# Patient Record
Sex: MALE | Race: WHITE | Employment: OTHER | URBAN - METROPOLITAN AREA
[De-identification: names, ages, dates, MRNs, and addresses within clinical notes are randomized per-mention and may not be internally consistent; named-entity substitution may affect disease eponyms.]

---

## 2017-06-28 ENCOUNTER — APPOINTMENT (OUTPATIENT)
Dept: GENERAL RADIOLOGY | Age: 62
DRG: 603 | End: 2017-06-28
Attending: INTERNAL MEDICINE
Payer: MEDICARE

## 2017-06-28 ENCOUNTER — APPOINTMENT (OUTPATIENT)
Dept: CT IMAGING | Age: 62
DRG: 603 | End: 2017-06-28
Attending: INTERNAL MEDICINE
Payer: MEDICARE

## 2017-06-28 ENCOUNTER — HOSPITAL ENCOUNTER (INPATIENT)
Age: 62
LOS: 5 days | Discharge: HOME | DRG: 603 | End: 2017-07-03
Attending: INTERNAL MEDICINE | Admitting: INTERNAL MEDICINE
Payer: MEDICARE

## 2017-06-28 DIAGNOSIS — L02.419 CELLULITIS AND ABSCESS OF LEG, EXCEPT FOOT: Primary | ICD-10-CM

## 2017-06-28 DIAGNOSIS — L03.119 CELLULITIS AND ABSCESS OF LEG, EXCEPT FOOT: Primary | ICD-10-CM

## 2017-06-28 DIAGNOSIS — R65.10 SIRS (SYSTEMIC INFLAMMATORY RESPONSE SYNDROME) (HCC): ICD-10-CM

## 2017-06-28 DIAGNOSIS — L03.317 CELLULITIS OF BUTTOCK: ICD-10-CM

## 2017-06-28 PROBLEM — F02.80 ALZHEIMER'S DEMENTIA (HCC): Status: ACTIVE | Noted: 2017-06-28

## 2017-06-28 PROBLEM — L03.116 LEFT LEG CELLULITIS: Status: ACTIVE | Noted: 2017-06-28

## 2017-06-28 PROBLEM — G30.9 ALZHEIMER'S DEMENTIA (HCC): Status: ACTIVE | Noted: 2017-06-28

## 2017-06-28 PROBLEM — E78.5 HLD (HYPERLIPIDEMIA): Status: ACTIVE | Noted: 2017-06-28

## 2017-06-28 PROBLEM — L03.90 CELLULITIS: Status: ACTIVE | Noted: 2017-06-28

## 2017-06-28 PROBLEM — E87.20 LACTIC ACID ACIDOSIS: Status: ACTIVE | Noted: 2017-06-28

## 2017-06-28 PROBLEM — I10 HTN (HYPERTENSION): Status: ACTIVE | Noted: 2017-06-28

## 2017-06-28 PROBLEM — K21.9 GERD (GASTROESOPHAGEAL REFLUX DISEASE): Status: ACTIVE | Noted: 2017-06-28

## 2017-06-28 LAB
ALBUMIN SERPL BCP-MCNC: 2.7 G/DL (ref 3.4–5)
ALBUMIN/GLOB SERPL: 0.6 {RATIO} (ref 0.8–1.7)
ALP SERPL-CCNC: 54 U/L (ref 45–117)
ALT SERPL-CCNC: 21 U/L (ref 16–61)
ANION GAP BLD CALC-SCNC: 12 MMOL/L (ref 3–18)
AST SERPL W P-5'-P-CCNC: 19 U/L (ref 15–37)
BASOPHILS # BLD AUTO: 0 K/UL (ref 0–0.06)
BASOPHILS # BLD: 0 % (ref 0–2)
BILIRUB SERPL-MCNC: 0.3 MG/DL (ref 0.2–1)
BUN SERPL-MCNC: 16 MG/DL (ref 7–18)
BUN/CREAT SERPL: 14 (ref 12–20)
CALCIUM SERPL-MCNC: 8.1 MG/DL (ref 8.5–10.1)
CHLORIDE SERPL-SCNC: 102 MMOL/L (ref 100–108)
CO2 SERPL-SCNC: 23 MMOL/L (ref 21–32)
CREAT SERPL-MCNC: 1.17 MG/DL (ref 0.6–1.3)
DIFFERENTIAL METHOD BLD: ABNORMAL
EOSINOPHIL # BLD: 0.1 K/UL (ref 0–0.4)
EOSINOPHIL NFR BLD: 1 % (ref 0–5)
ERYTHROCYTE [DISTWIDTH] IN BLOOD BY AUTOMATED COUNT: 13 % (ref 11.6–14.5)
GLOBULIN SER CALC-MCNC: 4.6 G/DL (ref 2–4)
GLUCOSE SERPL-MCNC: 167 MG/DL (ref 74–99)
HCT VFR BLD AUTO: 39.1 % (ref 36–48)
HGB BLD-MCNC: 13.5 G/DL (ref 13–16)
LACTATE SERPL-SCNC: 0.9 MMOL/L (ref 0.4–2)
LACTATE SERPL-SCNC: 3.1 MMOL/L (ref 0.4–2)
LIPASE SERPL-CCNC: 158 U/L (ref 73–393)
LYMPHOCYTES # BLD AUTO: 15 % (ref 21–52)
LYMPHOCYTES # BLD: 1.4 K/UL (ref 0.9–3.6)
MAGNESIUM SERPL-MCNC: 1.8 MG/DL (ref 1.6–2.6)
MCH RBC QN AUTO: 34.3 PG (ref 24–34)
MCHC RBC AUTO-ENTMCNC: 34.5 G/DL (ref 31–37)
MCV RBC AUTO: 99.2 FL (ref 74–97)
MONOCYTES # BLD: 0.9 K/UL (ref 0.05–1.2)
MONOCYTES NFR BLD AUTO: 10 % (ref 3–10)
NEUTS SEG # BLD: 6.6 K/UL (ref 1.8–8)
NEUTS SEG NFR BLD AUTO: 74 % (ref 40–73)
PLATELET # BLD AUTO: 210 K/UL (ref 135–420)
PMV BLD AUTO: 10.5 FL (ref 9.2–11.8)
POTASSIUM SERPL-SCNC: 3.9 MMOL/L (ref 3.5–5.5)
PROT SERPL-MCNC: 7.3 G/DL (ref 6.4–8.2)
RBC # BLD AUTO: 3.94 M/UL (ref 4.7–5.5)
SODIUM SERPL-SCNC: 137 MMOL/L (ref 136–145)
WBC # BLD AUTO: 9 K/UL (ref 4.6–13.2)

## 2017-06-28 PROCEDURE — 80053 COMPREHEN METABOLIC PANEL: CPT | Performed by: INTERNAL MEDICINE

## 2017-06-28 PROCEDURE — 96360 HYDRATION IV INFUSION INIT: CPT

## 2017-06-28 PROCEDURE — 99284 EMERGENCY DEPT VISIT MOD MDM: CPT

## 2017-06-28 PROCEDURE — 83605 ASSAY OF LACTIC ACID: CPT | Performed by: INTERNAL MEDICINE

## 2017-06-28 PROCEDURE — 87040 BLOOD CULTURE FOR BACTERIA: CPT | Performed by: INTERNAL MEDICINE

## 2017-06-28 PROCEDURE — 65270000029 HC RM PRIVATE

## 2017-06-28 PROCEDURE — 74011000258 HC RX REV CODE- 258: Performed by: INTERNAL MEDICINE

## 2017-06-28 PROCEDURE — 83735 ASSAY OF MAGNESIUM: CPT | Performed by: INTERNAL MEDICINE

## 2017-06-28 PROCEDURE — 71010 XR CHEST PORT: CPT

## 2017-06-28 PROCEDURE — 81001 URINALYSIS AUTO W/SCOPE: CPT | Performed by: INTERNAL MEDICINE

## 2017-06-28 PROCEDURE — 74011636320 HC RX REV CODE- 636/320: Performed by: INTERNAL MEDICINE

## 2017-06-28 PROCEDURE — 83690 ASSAY OF LIPASE: CPT | Performed by: INTERNAL MEDICINE

## 2017-06-28 PROCEDURE — 74177 CT ABD & PELVIS W/CONTRAST: CPT

## 2017-06-28 PROCEDURE — 85025 COMPLETE CBC W/AUTO DIFF WBC: CPT | Performed by: INTERNAL MEDICINE

## 2017-06-28 PROCEDURE — 74011250636 HC RX REV CODE- 250/636: Performed by: INTERNAL MEDICINE

## 2017-06-28 RX ORDER — PANTOPRAZOLE SODIUM 40 MG/1
40 TABLET, DELAYED RELEASE ORAL 2 TIMES DAILY
Status: DISCONTINUED | OUTPATIENT
Start: 2017-06-28 | End: 2017-07-01

## 2017-06-28 RX ORDER — CITALOPRAM 20 MG/1
20 TABLET, FILM COATED ORAL DAILY
Status: DISCONTINUED | OUTPATIENT
Start: 2017-06-29 | End: 2017-07-03 | Stop reason: HOSPADM

## 2017-06-28 RX ORDER — LORAZEPAM 1 MG/1
1 TABLET ORAL
Status: DISCONTINUED | OUTPATIENT
Start: 2017-06-28 | End: 2017-07-03 | Stop reason: HOSPADM

## 2017-06-28 RX ORDER — RISPERIDONE 0.5 MG/1
1 TABLET, FILM COATED ORAL DAILY
Status: DISCONTINUED | OUTPATIENT
Start: 2017-06-29 | End: 2017-07-03 | Stop reason: HOSPADM

## 2017-06-28 RX ORDER — ACETAMINOPHEN 325 MG/1
650 TABLET ORAL
Status: DISCONTINUED | OUTPATIENT
Start: 2017-06-29 | End: 2017-07-03 | Stop reason: HOSPADM

## 2017-06-28 RX ORDER — DIVALPROEX SODIUM 125 MG/1
250 CAPSULE, COATED PELLETS ORAL 2 TIMES DAILY
Status: DISCONTINUED | OUTPATIENT
Start: 2017-06-28 | End: 2017-07-03 | Stop reason: HOSPADM

## 2017-06-28 RX ORDER — GUAIFENESIN/DEXTROMETHORPHAN 100-10MG/5
5 SYRUP ORAL
Status: DISCONTINUED | OUTPATIENT
Start: 2017-06-29 | End: 2017-07-03 | Stop reason: HOSPADM

## 2017-06-28 RX ORDER — METHOCARBAMOL 500 MG/1
500 TABLET, FILM COATED ORAL 4 TIMES DAILY
Status: DISCONTINUED | OUTPATIENT
Start: 2017-06-29 | End: 2017-07-03 | Stop reason: HOSPADM

## 2017-06-28 RX ORDER — FLUTICASONE PROPIONATE 50 MCG
2 SPRAY, SUSPENSION (ML) NASAL DAILY
Status: DISCONTINUED | OUTPATIENT
Start: 2017-06-29 | End: 2017-07-03 | Stop reason: HOSPADM

## 2017-06-28 RX ORDER — DEXTROSE MONOHYDRATE AND SODIUM CHLORIDE 5; .45 G/100ML; G/100ML
50 INJECTION, SOLUTION INTRAVENOUS CONTINUOUS
Status: DISCONTINUED | OUTPATIENT
Start: 2017-06-28 | End: 2017-07-03 | Stop reason: HOSPADM

## 2017-06-28 RX ORDER — FACIAL-BODY WIPES
10 EACH TOPICAL DAILY
Status: DISCONTINUED | OUTPATIENT
Start: 2017-06-29 | End: 2017-07-03 | Stop reason: HOSPADM

## 2017-06-28 RX ORDER — HEPARIN SODIUM 5000 [USP'U]/ML
5000 INJECTION, SOLUTION INTRAVENOUS; SUBCUTANEOUS EVERY 8 HOURS
Status: DISCONTINUED | OUTPATIENT
Start: 2017-06-28 | End: 2017-07-03 | Stop reason: HOSPADM

## 2017-06-28 RX ORDER — SODIUM CHLORIDE 0.9 % (FLUSH) 0.9 %
5-10 SYRINGE (ML) INJECTION AS NEEDED
Status: DISCONTINUED | OUTPATIENT
Start: 2017-06-28 | End: 2017-07-03 | Stop reason: HOSPADM

## 2017-06-28 RX ORDER — NAPROXEN 250 MG/1
500 TABLET ORAL 2 TIMES DAILY WITH MEALS
Status: DISCONTINUED | OUTPATIENT
Start: 2017-06-29 | End: 2017-07-03 | Stop reason: HOSPADM

## 2017-06-28 RX ORDER — LOPERAMIDE HYDROCHLORIDE 2 MG/1
2 CAPSULE ORAL
Status: DISCONTINUED | OUTPATIENT
Start: 2017-06-29 | End: 2017-07-03 | Stop reason: HOSPADM

## 2017-06-28 RX ADMIN — VANCOMYCIN HYDROCHLORIDE 1250 MG: 10 INJECTION, POWDER, LYOPHILIZED, FOR SOLUTION INTRAVENOUS at 23:44

## 2017-06-28 RX ADMIN — CEFTRIAXONE 1 G: 1 INJECTION, POWDER, FOR SOLUTION INTRAMUSCULAR; INTRAVENOUS at 22:33

## 2017-06-28 RX ADMIN — SODIUM CHLORIDE 1000 ML: 900 INJECTION, SOLUTION INTRAVENOUS at 18:31

## 2017-06-28 RX ADMIN — IOPAMIDOL 100 ML: 612 INJECTION, SOLUTION INTRAVENOUS at 20:10

## 2017-06-28 RX ADMIN — SODIUM CHLORIDE 1436 ML: 900 INJECTION, SOLUTION INTRAVENOUS at 22:31

## 2017-06-28 NOTE — ED NOTES
Bedside and Verbal shift change report given to Chantale Villasenor RN (oncoming nurse) by Lawson Sheikh RN   (offgoing nurse). Report included the following information SBAR, ED Summary, MAR and Recent Results.

## 2017-06-28 NOTE — IP AVS SNAPSHOT
Chen Rodriguez 
 
 
 [address] [facility] [address] 
[phone] Patient: Jojo Joshua MRN: [id number] NOU:[id number] You are allergic to the following No active allergies Recent Documentation Height Weight BMI Smoking Status 1.575 m 83.6 kg 33.71 kg/m2 Never Smoker Unresulted Labs Order Current Status CULTURE, BLOOD Preliminary result Emergency Contacts Name Discharge Info Relation Home Work Mobile Arelyg[employer]elaJewels DISCHARGE CAREGIVER [3] Guardian [19] [phone] About your hospitalization You were admitted on:  June 28, 2017 You last received care in the:  [address] [place] [address] You were discharged on:  July 3, 2017 Unit phone number:  [phone] Why you were hospitalized Your primary diagnosis was:  Cellulitis Your diagnoses also included:  Left Leg Cellulitis, Uti (Urinary Tract Infection), Down's Syndrome, Alzheimer's Dementia, Htn (Hypertension), Hld (Hyperlipidemia), Gerd (Gastroesophageal Reflux Disease), Lactic Acid Acidosis Providers Seen During Your Hospitalizations Provider Role Specialty Primary office phone Latasha Alarcon MD Attending Provider Emergency Medicine [phone] Eboni Beltre MD Attending Provider Internal Medicine [phone] Skip Osorio MD Attending Provider Internal Medicine [phone] Your Primary Care Physician (PCP) Primary Care Physician Office Phone Office Fax Ira Hdez [phone] Follow-up Information Follow up With Details Comments Contact Info Stefan Ford MD   [address] [facility] [address] [address] 
[phone] [address] [place] [address] is responsible for making arrangements for the PCP visit while in-house. Chosen to continue managing your healthcare needs [address] Laura Rolon, [address] [facility] [address] [phone] Brittany Knutson MD In 1 week  [address] [address] 
[phone] Current Discharge Medication List  
  
START taking these medications Dose & Instructions Dispensing Information Comments Morning Noon Evening Bedtime  
 trimethoprim-sulfamethoxazole  mg per tablet Commonly known as:  BACTRIM Your last dose was: Your next dose is:    
   
   
 Dose:  1 Tab Take 1 Tab by mouth two (2) times a day for 3 days. Quantity:  6 Tab Refills:  0 CONTINUE these medications which have NOT CHANGED Dose & Instructions Dispensing Information Comments Morning Noon Evening Bedtime  
 acetaminophen 650 mg CR tablet Commonly known as:  TYLENOL Your last dose was: Your next dose is:    
   
   
 Dose:  650 mg Take 650 mg by mouth every six (6) hours as needed for Pain. Refills:  0 BISCOLAX 10 mg suppository Generic drug:  bisacodyl Your last dose was: Your next dose is:    
   
   
 Dose:  10 mg Insert 10 mg into rectum daily. Refills:  0  
     
   
   
   
  
 citalopram 20 mg tablet Commonly known as:  Tanisha Anderson Your last dose was: Your next dose is: Take  by mouth daily. Refills:  0  
     
   
   
   
  
 divalproex 125 mg capsule Commonly known as:  DEPAKOTE SPRINKLE Your last dose was: Your next dose is:    
   
   
 Dose:  250 mg Take 2 Caps by mouth two (2) times a day. Quantity:  60 Cap Refills:  0  
     
   
   
   
  
 FISH OIL 1,000 mg Cap Generic drug:  omega-3 fatty acids-vitamin e Your last dose was: Your next dose is:    
   
   
 Dose:  1 Cap Take 1 Cap by mouth. Refills:  0  
     
   
   
   
  
 FLONASE 50 mcg/actuation nasal spray Generic drug:  fluticasone Your last dose was: Your next dose is:    
   
   
 Dose:  2 Spray 2 Sprays by Both Nostrils route daily. Refills:  0 Ralph Jackson Generic drug:  nutritional supplements Your last dose was: Your next dose is: Take  by mouth. Refills:  0  
     
   
   
   
  
 loperamide 2 mg tablet Commonly known as:  IMMODIUM Your last dose was: Your next dose is:    
   
   
 Dose:  2 mg Take 2 mg by mouth four (4) times daily as needed for Diarrhea. Refills:  0 LORazepam 1 mg tablet Commonly known as:  ATIVAN Your last dose was: Your next dose is:    
   
   
 Dose:  1 mg Take 1 Tab by mouth every four (4) hours as needed for Anxiety. Max Daily Amount: 6 mg. Quantity:  2 Tab Refills:  0  
     
   
   
   
  
 methocarbamol 500 mg tablet Commonly known as:  ROBAXIN Your last dose was: Your next dose is:    
   
   
 Dose:  500 mg Take 500 mg by mouth four (4) times daily. Refills:  0 MINTOX PO Your last dose was: Your next dose is:    
   
   
 Dose:  15 mL Take 15 mL by mouth daily as needed. Refills:  0  
     
   
   
   
  
 naproxen 500 mg tablet Commonly known as:  NAPROSYN Your last dose was: Your next dose is:    
   
   
 Dose:  500 mg Take 500 mg by mouth two (2) times daily (with meals). Refills:  0  
     
   
   
   
  
 pantoprazole 40 mg tablet Commonly known as:  PROTONIX Your last dose was: Your next dose is:    
   
   
 Dose:  40 mg Take 1 Tab by mouth two (2) times a day. Quantity:  30 Tab Refills:  0  
     
   
   
   
  
 * risperiDONE 1 mg tablet Commonly known as:  RisperDAL Your last dose was: Your next dose is: Take  by mouth daily. Refills:  0  
     
   
   
   
  
 * RisperDAL 0.5 mg tablet Generic drug:  risperiDONE Your last dose was:     
   
Your next dose is:    
   
   
 Dose:  0.5 mg  
 Take 0.5 mg by mouth as needed. Refills:  0  
     
   
   
   
  
 ROBAFEN CF 30- mg/5 mL solution Generic drug:  PSEUDOEPHEDRINE-dextromethorphan-guaiFENesin Your last dose was: Your next dose is:    
   
   
 Dose:  5 mL Take 5 mL by mouth every four (4) hours as needed for Cough. Refills:  0  
     
   
   
   
  
 * Notice: This list has 2 medication(s) that are the same as other medications prescribed for you. Read the directions carefully, and ask your doctor or other care provider to review them with you. Where to Get Your Medications Information on where to get these meds will be given to you by the nurse or doctor. ! Ask your nurse or doctor about these medications LORazepam 1 mg tablet  
 trimethoprim-sulfamethoxazole  mg per tablet Discharge Instructions Cellulitis: Care Instructions Your Care Instructions Cellulitis is a skin infection. It often occurs after a break in the skin from a scrape, cut, bite, or puncture, or after a rash. The doctor has checked you carefully, but problems can develop later. If you notice any problems or new symptoms, get medical treatment right away. Follow-up care is a key part of your treatment and safety. Be sure to make and go to all appointments, and call your doctor if you are having problems. It's also a good idea to know your test results and keep a list of the medicines you take. How can you care for yourself at home? · Take your antibiotics as directed. Do not stop taking them just because you feel better. You need to take the full course of antibiotics. · Prop up the infected area on pillows to reduce pain and swelling. Try to keep the area above the level of your heart as often as you can. · If your doctor told you how to care for your wound, follow your doctor's instructions. If you did not get instructions, follow this general advice: ¨ Wash the wound with clean water 2 times a day. Don't use hydrogen peroxide or alcohol, which can slow healing. ¨ You may cover the wound with a thin layer of petroleum jelly, such as Vaseline, and a nonstick bandage. ¨ Apply more petroleum jelly and replace the bandage as needed. · Be safe with medicines. Take pain medicines exactly as directed. ¨ If the doctor gave you a prescription medicine for pain, take it as prescribed. ¨ If you are not taking a prescription pain medicine, ask your doctor if you can take an over-the-counter medicine. To prevent cellulitis in the future · Try to prevent cuts, scrapes, or other injuries to your skin. Cellulitis most often occurs where there is a break in the skin. · If you get a scrape, cut, mild burn, or bite, wash the wound with clean water as soon as you can to help avoid infection. Don't use hydrogen peroxide or alcohol, which can slow healing. · If you have swelling in your legs (edema), support stockings and good skin care may help prevent leg sores and cellulitis. · Take care of your feet, especially if you have diabetes or other conditions that increase the risk of infection. Wear shoes and socks. Do not go barefoot. If you have athlete's foot or other skin problems on your feet, talk to your doctor about how to treat them. When should you call for help? Call your doctor now or seek immediate medical care if: 
· You have signs that your infection is getting worse, such as: 
¨ Increased pain, swelling, warmth, or redness. ¨ Red streaks leading from the area. ¨ Pus draining from the area. ¨ A fever. · You get a rash. Watch closely for changes in your health, and be sure to contact your doctor if: 
· You are not getting better after 1 day (24 hours). · You do not get better as expected. Where can you learn more? Urinary Tract Infections in Men: Care Instructions Your Care Instructions A urinary tract infection, or UTI, is a general term for an infection anywhere between the kidneys and the tip of the penis. UTIs can also be a result of a prostate problem. Most cause pain or burning when you urinate. Most UTIs are caused by bacteria and can be cured with antibiotics. It is important to complete your treatment so that the infection does not get worse. Follow-up care is a key part of your treatment and safety. Be sure to make and go to all appointments, and call your doctor if you are having problems. It's also a good idea to know your test results and keep a list of the medicines you take. How can you care for yourself at home? · Take your antibiotics as prescribed. Do not stop taking them just because you feel better. You need to take the full course of antibiotics. · Take your medicines exactly as prescribed. Your doctor may have prescribed a medicine, such as phenazopyridine (Pyridium), to help relieve pain when you urinate. This turns your urine orange. You may stop taking it when your symptoms get better. But be sure to take all of your antibiotics, which treat the infection. · Drink extra water for the next day or two. This will help make the urine less concentrated and help wash out the bacteria causing the infection. (If you have kidney, heart, or liver disease and have to limit your fluids, talk with your doctor before you increase your fluid intake.) · Avoid drinks that are carbonated or have caffeine. They can irritate the bladder. · Urinate often. Try to empty your bladder each time. · To relieve pain, take a hot bath or lay a heating pad (set on low) over your lower belly or genital area. Never go to sleep with a heating pad in place. To help prevent UTIs · Drink plenty of fluids, enough so that your urine is light yellow or clear like water.  If you have kidney, heart, or liver disease and have to limit fluids, talk with your doctor before you increase the amount of fluids you drink. · Urinate when you have the urge. Do not hold your urine for a long time. Urinate before you go to sleep. · Keep your penis clean. Catheter care If you have a drainage tube (catheter) in place, the following steps will help you care for it. · Always wash your hands before and after touching your catheter. · Check the area around the urethra for inflammation or signs of infection. Signs of infection include irritated, swollen, red, or tender skin, or pus around the catheter. · Clean the area around the catheter with soap and water two times a day. Dry with a clean towel afterward. · Do not apply powder or lotion to the skin around the catheter. To empty the urine collection bag · Wash your hands with soap and water. · Without touching the drain spout, remove the spout from its sleeve at the bottom of the collection bag. Open the valve on the spout. · Let the urine flow out of the bag and into the toilet or a container. Do not let the tubing or drain spout touch anything. · After you empty the bag, clean the end of the drain spout with tissue and water. Close the valve and put the drain spout back into its sleeve at the bottom of the collection bag. · Wash your hands with soap and water. When should you call for help? Call your doctor now or seek immediate medical care if: · Symptoms such as a fever, chills, nausea, or vomiting get worse or happen for the first time. · You have new pain in your back just below your rib cage. This is called flank pain. · There is new blood or pus in your urine. · You are not able to take or keep down your antibiotics. Watch closely for changes in your health, and be sure to contact your doctor if: 
· You are not getting better after taking an antibiotic for 2 days. · Your symptoms go away but then come back. Where can you learn more? Go to http://lavern-argenis.info/. Enter P100 in the search box to learn more about \"Urinary Tract Infections in Men: Care Instructions. \" Current as of: November 28, 2016 Content Version: 11.3 © 9035-6557 [employer]. Care instructions adapted under license by [employer] (which disclaims liability or warranty for this information). If you have questions about a medical condition or this instruction, always ask your healthcare professional. [address] any warranty or liability for your use of this information. Go to http://lavern-argenis.info/. Kartik Pardo in the search box to learn more about \"Cellulitis: Care Instructions. \" Current as of: October 13, 2016 Content Version: 11.3 © 9226-2679 [employer]. Care instructions adapted under license by [employer] (which disclaims liability or warranty for this information). If you have questions about a medical condition or this instruction, always ask your healthcare professional. [address] any warranty or liability for your use of this information. Patient armband removed and shredded Discharge Orders None Modern [employer] Announcement We are excited to announce that we are making your provider's discharge notes available to you in Modern Mast. You will see these notes when they are completed and signed by the physician that discharged you from your recent hospital stay. If you have any questions or concerns about any information you see in Modern Mast, please call the Health Information Department where you were seen or reach out to your Primary Care Provider for more information about your plan of care. Introducing [facility]! [employer] introduces [employer] patient portal. Now you can access parts of your medical record, email your doctor's office, and request medication refills online. 1. In your internet browser, go to [email or web address] 2. Click on the First Time User? Click Here link in the Sign In box. You will see the New Member Sign Up page. 3. Enter your [employer] Access Code exactly as it appears below. You will not need to use this code after youve completed the sign-up process. If you do not sign up before the expiration date, you must request a new code. · [employer] Access Code: [email or web address] Expires: 9/26/2017  5:41 PM 
 
4. Enter the last four digits of your Social Security Number (xxxx) and Date of Birth (mm/dd/yyyy) as indicated and click Submit. You will be taken to the next sign-up page. 5. Create a [employer] ID. This will be your [employer] login ID and cannot be changed, so think of one that is secure and easy to remember. 6. Create a [employer] password. You can change your password at any time. 7. Enter your Password Reset Question and Answer. This can be used at a later time if you forget your password. 8. Enter your e-mail address. You will receive e-mail notification when new information is available in [address] 9. Click Sign Up. You can now view and download portions of your medical record. 10. Click the Download Summary menu link to download a portable copy of your medical information. If you have questions, please visit the Frequently Asked Questions section of the [employer] website. Remember, [employer] is NOT to be used for urgent needs. For medical emergencies, dial 911. Now available from your iPhone and Android! General Information Please provide this summary of care documentation to your next provider. Patient Signature:  ____________________________________________________________ Date:  ____________________________________________________________  
  
Connye Brod Provider Signature:  ____________________________________________________________ Date:  ____________________________________________________________

## 2017-06-28 NOTE — ED TRIAGE NOTES
Patient with rash that started on bilateral legs and fever today. Sepsis Screening completed    (  )Patient meets SIRS criteria. ( x )Patient does not meet SIRS criteria.       SIRS Criteria is achieved when two or more of the following are present   Temperature < 96.8°F (36°C) or > 100.9°F (38.3°C)   Heart Rate > 90 beats per minute   Respiratory Rate > 20 breaths per minute   WBC count > 12,000 or <4,000 or > 10% bands

## 2017-06-28 NOTE — Clinical Note
Patient Class[de-identified] Observation [317] Type of Bed: Medical [8] Reason for Observation: cellulitis buttocks, thigh right, 
 Admitting Diagnosis: Cellulitis of thigh [305379] Admitting Diagnosis: Cellulitis of buttock [[id number]] Admitting Physician: Gabriel Berkowitz [[id number]] Attending Physician: Gabriel Berkowitz [[id number]] Comments: elevated lactic, 3.1

## 2017-06-28 NOTE — IP AVS SNAPSHOT
Current Discharge Medication List  
  
START taking these medications Dose & Instructions Dispensing Information Comments Morning Noon Evening Bedtime  
 trimethoprim-sulfamethoxazole  mg per tablet Commonly known as:  BACTRIM Your last dose was: Your next dose is:    
   
   
 Dose:  1 Tab Take 1 Tab by mouth two (2) times a day for 3 days. Quantity:  6 Tab Refills:  0 CONTINUE these medications which have NOT CHANGED Dose & Instructions Dispensing Information Comments Morning Noon Evening Bedtime  
 acetaminophen 650 mg CR tablet Commonly known as:  TYLENOL Your last dose was: Your next dose is:    
   
   
 Dose:  650 mg Take 650 mg by mouth every six (6) hours as needed for Pain. Refills:  0 BISCOLAX 10 mg suppository Generic drug:  bisacodyl Your last dose was: Your next dose is:    
   
   
 Dose:  10 mg Insert 10 mg into rectum daily. Refills:  0  
     
   
   
   
  
 citalopram 20 mg tablet Commonly known as:  Vikash Covert Your last dose was: Your next dose is: Take  by mouth daily. Refills:  0  
     
   
   
   
  
 divalproex 125 mg capsule Commonly known as:  DEPAKOTE SPRINKLE Your last dose was: Your next dose is:    
   
   
 Dose:  250 mg Take 2 Caps by mouth two (2) times a day. Quantity:  60 Cap Refills:  0  
     
   
   
   
  
 FISH OIL 1,000 mg Cap Generic drug:  omega-3 fatty acids-vitamin e Your last dose was: Your next dose is:    
   
   
 Dose:  1 Cap Take 1 Cap by mouth. Refills:  0  
     
   
   
   
  
 FLONASE 50 mcg/actuation nasal spray Generic drug:  fluticasone Your last dose was: Your next dose is:    
   
   
 Dose:  2 Spray 2 Sprays by Both Nostrils route daily. Refills:  0 Yvette Odell Generic drug:  nutritional supplements Your last dose was: Your next dose is: Take  by mouth. Refills:  0  
     
   
   
   
  
 loperamide 2 mg tablet Commonly known as:  IMMODIUM Your last dose was: Your next dose is:    
   
   
 Dose:  2 mg Take 2 mg by mouth four (4) times daily as needed for Diarrhea. Refills:  0 LORazepam 1 mg tablet Commonly known as:  ATIVAN Your last dose was: Your next dose is:    
   
   
 Dose:  1 mg Take 1 Tab by mouth every four (4) hours as needed for Anxiety. Max Daily Amount: 6 mg. Quantity:  2 Tab Refills:  0  
     
   
   
   
  
 methocarbamol 500 mg tablet Commonly known as:  ROBAXIN Your last dose was: Your next dose is:    
   
   
 Dose:  500 mg Take 500 mg by mouth four (4) times daily. Refills:  0 MINTOX PO Your last dose was: Your next dose is:    
   
   
 Dose:  15 mL Take 15 mL by mouth daily as needed. Refills:  0  
     
   
   
   
  
 naproxen 500 mg tablet Commonly known as:  NAPROSYN Your last dose was: Your next dose is:    
   
   
 Dose:  500 mg Take 500 mg by mouth two (2) times daily (with meals). Refills:  0  
     
   
   
   
  
 pantoprazole 40 mg tablet Commonly known as:  PROTONIX Your last dose was: Your next dose is:    
   
   
 Dose:  40 mg Take 1 Tab by mouth two (2) times a day. Quantity:  30 Tab Refills:  0  
     
   
   
   
  
 * risperiDONE 1 mg tablet Commonly known as:  RisperDAL Your last dose was: Your next dose is: Take  by mouth daily. Refills:  0  
     
   
   
   
  
 * RisperDAL 0.5 mg tablet Generic drug:  risperiDONE Your last dose was: Your next dose is:    
   
   
 Dose:  0.5 mg Take 0.5 mg by mouth as needed. Refills:  0 ROBAFEN CF 30- mg/5 mL solution Generic drug:  PSEUDOEPHEDRINE-dextromethorphan-guaiFENesin Your last dose was: Your next dose is:    
   
   
 Dose:  5 mL Take 5 mL by mouth every four (4) hours as needed for Cough. Refills:  0  
     
   
   
   
  
 * Notice: This list has 2 medication(s) that are the same as other medications prescribed for you. Read the directions carefully, and ask your doctor or other care provider to review them with you. Where to Get Your Medications Information on where to get these meds will be given to you by the nurse or doctor. ! Ask your nurse or doctor about these medications LORazepam 1 mg tablet  
 trimethoprim-sulfamethoxazole  mg per tablet

## 2017-06-29 PROBLEM — L03.116 LEFT LEG CELLULITIS: Status: RESOLVED | Noted: 2017-06-28 | Resolved: 2017-06-29

## 2017-06-29 LAB
ALBUMIN SERPL BCP-MCNC: 2.2 G/DL (ref 3.4–5)
ALBUMIN/GLOB SERPL: 0.5 {RATIO} (ref 0.8–1.7)
ALP SERPL-CCNC: 46 U/L (ref 45–117)
ALT SERPL-CCNC: 19 U/L (ref 16–61)
ANION GAP BLD CALC-SCNC: 7 MMOL/L (ref 3–18)
APPEARANCE UR: ABNORMAL
APTT PPP: 33.3 SEC (ref 23–36.4)
AST SERPL W P-5'-P-CCNC: 19 U/L (ref 15–37)
BACTERIA URNS QL MICRO: ABNORMAL /HPF
BILIRUB SERPL-MCNC: 0.3 MG/DL (ref 0.2–1)
BILIRUB UR QL: NEGATIVE
BUN SERPL-MCNC: 13 MG/DL (ref 7–18)
BUN/CREAT SERPL: 13 (ref 12–20)
CALCIUM SERPL-MCNC: 7.7 MG/DL (ref 8.5–10.1)
CHLORIDE SERPL-SCNC: 108 MMOL/L (ref 100–108)
CO2 SERPL-SCNC: 26 MMOL/L (ref 21–32)
COLOR UR: YELLOW
CREAT SERPL-MCNC: 1.02 MG/DL (ref 0.6–1.3)
EPITH CASTS URNS QL MICRO: NEGATIVE /LPF (ref 0–5)
ERYTHROCYTE [DISTWIDTH] IN BLOOD BY AUTOMATED COUNT: 13.2 % (ref 11.6–14.5)
GLOBULIN SER CALC-MCNC: 4.1 G/DL (ref 2–4)
GLUCOSE BLD STRIP.AUTO-MCNC: 150 MG/DL (ref 70–110)
GLUCOSE BLD STRIP.AUTO-MCNC: 91 MG/DL (ref 70–110)
GLUCOSE SERPL-MCNC: 90 MG/DL (ref 74–99)
GLUCOSE UR STRIP.AUTO-MCNC: NEGATIVE MG/DL
HCT VFR BLD AUTO: 35.6 % (ref 36–48)
HGB BLD-MCNC: 11.9 G/DL (ref 13–16)
HGB UR QL STRIP: ABNORMAL
INR PPP: 1 (ref 0.8–1.2)
KETONES UR QL STRIP.AUTO: NEGATIVE MG/DL
LEUKOCYTE ESTERASE UR QL STRIP.AUTO: ABNORMAL
MAGNESIUM SERPL-MCNC: 1.9 MG/DL (ref 1.6–2.6)
MCH RBC QN AUTO: 33.6 PG (ref 24–34)
MCHC RBC AUTO-ENTMCNC: 33.4 G/DL (ref 31–37)
MCV RBC AUTO: 100.6 FL (ref 74–97)
MUCOUS THREADS URNS QL MICRO: NEGATIVE /LPF
NITRITE UR QL STRIP.AUTO: POSITIVE
PH UR STRIP: 6 [PH] (ref 5–8)
PLATELET # BLD AUTO: 180 K/UL (ref 135–420)
PMV BLD AUTO: 10.2 FL (ref 9.2–11.8)
POTASSIUM SERPL-SCNC: 4 MMOL/L (ref 3.5–5.5)
PROT SERPL-MCNC: 6.3 G/DL (ref 6.4–8.2)
PROT UR STRIP-MCNC: NEGATIVE MG/DL
PROTHROMBIN TIME: 13.2 SEC (ref 11.5–15.2)
RBC # BLD AUTO: 3.54 M/UL (ref 4.7–5.5)
RBC #/AREA URNS HPF: ABNORMAL /HPF (ref 0–5)
SODIUM SERPL-SCNC: 141 MMOL/L (ref 136–145)
SP GR UR REFRACTOMETRY: >1.03 (ref 1–1.03)
UROBILINOGEN UR QL STRIP.AUTO: 0.2 EU/DL (ref 0.2–1)
WBC # BLD AUTO: 6.7 K/UL (ref 4.6–13.2)
WBC URNS QL MICRO: ABNORMAL /HPF (ref 0–5)

## 2017-06-29 PROCEDURE — 93971 EXTREMITY STUDY: CPT

## 2017-06-29 PROCEDURE — 36415 COLL VENOUS BLD VENIPUNCTURE: CPT | Performed by: INTERNAL MEDICINE

## 2017-06-29 PROCEDURE — 74011000258 HC RX REV CODE- 258: Performed by: INTERNAL MEDICINE

## 2017-06-29 PROCEDURE — 80053 COMPREHEN METABOLIC PANEL: CPT | Performed by: INTERNAL MEDICINE

## 2017-06-29 PROCEDURE — 74011250636 HC RX REV CODE- 250/636: Performed by: INTERNAL MEDICINE

## 2017-06-29 PROCEDURE — 85027 COMPLETE CBC AUTOMATED: CPT | Performed by: INTERNAL MEDICINE

## 2017-06-29 PROCEDURE — 87086 URINE CULTURE/COLONY COUNT: CPT | Performed by: INTERNAL MEDICINE

## 2017-06-29 PROCEDURE — 82962 GLUCOSE BLOOD TEST: CPT

## 2017-06-29 PROCEDURE — 85610 PROTHROMBIN TIME: CPT | Performed by: INTERNAL MEDICINE

## 2017-06-29 PROCEDURE — 74011250637 HC RX REV CODE- 250/637: Performed by: INTERNAL MEDICINE

## 2017-06-29 PROCEDURE — 65270000029 HC RM PRIVATE

## 2017-06-29 PROCEDURE — 85730 THROMBOPLASTIN TIME PARTIAL: CPT | Performed by: INTERNAL MEDICINE

## 2017-06-29 PROCEDURE — 92610 EVALUATE SWALLOWING FUNCTION: CPT

## 2017-06-29 PROCEDURE — 83735 ASSAY OF MAGNESIUM: CPT | Performed by: HOSPITALIST

## 2017-06-29 RX ADMIN — BISACODYL 10 MG: 10 SUPPOSITORY RECTAL at 10:25

## 2017-06-29 RX ADMIN — DIVALPROEX SODIUM 250 MG: 125 CAPSULE, COATED PELLETS ORAL at 10:25

## 2017-06-29 RX ADMIN — Medication 10 ML: at 06:01

## 2017-06-29 RX ADMIN — FLUTICASONE PROPIONATE 2 SPRAY: 50 SPRAY, METERED NASAL at 10:25

## 2017-06-29 RX ADMIN — METHOCARBAMOL 500 MG: 500 TABLET ORAL at 17:03

## 2017-06-29 RX ADMIN — METHOCARBAMOL 500 MG: 500 TABLET ORAL at 22:41

## 2017-06-29 RX ADMIN — HEPARIN SODIUM 5000 UNITS: 5000 INJECTION, SOLUTION INTRAVENOUS; SUBCUTANEOUS at 17:03

## 2017-06-29 RX ADMIN — DIVALPROEX SODIUM 250 MG: 125 CAPSULE, COATED PELLETS ORAL at 22:40

## 2017-06-29 RX ADMIN — NAPROXEN 500 MG: 250 TABLET ORAL at 17:02

## 2017-06-29 RX ADMIN — GUAIFENESIN AND DEXTROMETHORPHAN HYDROBROMIDE 5 ML: 100; 10 LIQUID ORAL at 10:25

## 2017-06-29 RX ADMIN — HEPARIN SODIUM 5000 UNITS: 5000 INJECTION, SOLUTION INTRAVENOUS; SUBCUTANEOUS at 00:20

## 2017-06-29 RX ADMIN — DEXTROSE MONOHYDRATE AND SODIUM CHLORIDE 75 ML/HR: 5; .45 INJECTION, SOLUTION INTRAVENOUS at 02:24

## 2017-06-29 RX ADMIN — CEFTRIAXONE 1 G: 1 INJECTION, POWDER, FOR SOLUTION INTRAMUSCULAR; INTRAVENOUS at 22:41

## 2017-06-29 RX ADMIN — PANTOPRAZOLE SODIUM 40 MG: 40 TABLET, DELAYED RELEASE ORAL at 22:41

## 2017-06-29 RX ADMIN — HEPARIN SODIUM 5000 UNITS: 5000 INJECTION, SOLUTION INTRAVENOUS; SUBCUTANEOUS at 06:04

## 2017-06-29 RX ADMIN — CITALOPRAM HYDROBROMIDE 20 MG: 20 TABLET ORAL at 10:25

## 2017-06-29 RX ADMIN — PANTOPRAZOLE SODIUM 40 MG: 40 TABLET, DELAYED RELEASE ORAL at 10:25

## 2017-06-29 RX ADMIN — NAPROXEN 500 MG: 250 TABLET ORAL at 07:53

## 2017-06-29 RX ADMIN — METHOCARBAMOL 500 MG: 500 TABLET ORAL at 10:25

## 2017-06-29 RX ADMIN — RISPERIDONE 1 MG: 0.5 TABLET, FILM COATED ORAL at 10:25

## 2017-06-29 RX ADMIN — VANCOMYCIN HYDROCHLORIDE 1250 MG: 10 INJECTION, POWDER, LYOPHILIZED, FOR SOLUTION INTRAVENOUS at 19:06

## 2017-06-29 NOTE — ROUTINE PROCESS
19:45: Received bedside/verbal report from Khloe Cobb RN. Report included FARRAH JONES, MAR.     05:00: Pt has alternated between periods of restful sleep and wakefulness entire shift. IVF infusing without difficuty. Safety measures in place. Pt required complete bed change X2 due to incontinence.

## 2017-06-29 NOTE — H&P
Hospitalist Admission History and Physical    NAME:  Sherry Vazquez   :      MRN:   [id number]     PCP:  Gretel Calderon MD  Date/Time:  2017 10:39 PM  Subjective:   CHIEF COMPLAINT:  LLE cellulitis     HISTORY OF PRESENT ILLNESS:     Leslie Lr is a 62 y.o. With medical hx as listed comes to the hospital for evaluation of LLE redness. Hx per caregiver due to patients mental status. Hx is very limited as the substitute caregiver that's present at bedside received a signout from the off going caregiver around 6pm. ALso I tried to reach his brother, Jeet Ortiz, but he did not asnwer his phone therefore left voicemail. Caregiver noticed patient started developing left sided thigh redness about 2 days ago and it had progressively worsened spreading more proximally therefore brought him to the ED. He was also noted to have some chills at home but no other complaints. His appetite remains the same, he wears diapers. He remains bedbound at home. Take his meds as given and eats pureed diet. When I saw the patient he was asleep and did not wake up to verbal stimuli. Per caregive its usually difficult to wake him up once asleep. No other complaints. FULL CODE         Past Medical History:   Diagnosis Date    Alzheimer's dementia     Down's syndrome     Seizure ([facility] [address]         No past surgical history on file. Social History   Substance Use Topics    Smoking status: Never Smoker    Smokeless tobacco: Not on file    Alcohol use Not on file        No family history on file. No Known Allergies     Prior to Admission Medications   Prescriptions Last Dose Informant Patient Reported? Taking? LORazepam (ATIVAN) 1 mg tablet   Yes No   Sig: Take 1 mg by mouth every four (4) hours as needed for Anxiety. MAG HYDROX/AL HYDROX/SIMETH (MINTOX PO)   Yes No   Sig: Take 15 mL by mouth daily as needed.    PSEUDOEPHEDRINE-dextromethorphan-guaiFENesin (ROBAFEN CF) 30- mg/5 mL solution   Yes No Sig: Take 5 mL by mouth every four (4) hours as needed for Cough. acetaminophen (TYLENOL) 650 mg CR tablet   Yes No   Sig: Take 650 mg by mouth every six (6) hours as needed for Pain. bisacodyl (BISCOLAX) 10 mg suppository   Yes No   Sig: Insert 10 mg into rectum daily. citalopram (CELEXA) 20 mg tablet   Yes No   Sig: Take  by mouth daily. divalproex (DEPAKOTE SPRINKLE) 125 mg capsule   No No   Sig: Take 2 Caps by mouth two (2) times a day. fluticasone (FLONASE) 50 mcg/actuation nasal spray   Yes No   Si Sprays by Both Nostrils route daily. loperamide (IMMODIUM) 2 mg tablet   Yes No   Sig: Take 2 mg by mouth four (4) times daily as needed for Diarrhea. methocarbamol (ROBAXIN) 500 mg tablet   Yes No   Sig: Take 500 mg by mouth four (4) times daily. naproxen (NAPROSYN) 500 mg tablet   Yes No   Sig: Take 500 mg by mouth two (2) times daily (with meals). nutritional supplements (BROOKS) pack   Yes No   Sig: Take  by mouth. omega-3 fatty acids-vitamin e (FISH OIL) 1,000 mg cap   Yes No   Sig: Take 1 Cap by mouth.   pantoprazole (PROTONIX) 40 mg tablet   No No   Sig: Take 1 Tab by mouth two (2) times a day. risperiDONE (RISPERDAL) 0.5 mg tablet   Yes No   Sig: Take 0.5 mg by mouth as needed. risperiDONE (RISPERDAL) 1 mg tablet   Yes No   Sig: Take  by mouth daily.       Facility-Administered Medications: None       REVIEW OF SYSTEMS:     [x] Unable to obtain  ROS due to  [x]mental status change  []sedated   []intubated   []Total of 12 systems reviewed as follows:  Constitutional:  negative fever, negative chills, negative weight loss  Eyes:   negative visual changes  ENT:   negative sore throat, tongue or lip swelling  Respiratory:  negative cough, negative dyspnea  Cards:   negative for chest pain, palpitations, lower extremity edema  GI:   negative for nausea, vomiting, diarrhea, and abdominal pain  Genitourinary: negative for frequency, dysuria  Integument:  negative for rash and pruritus  Hematologic:  negative for easy bruising and gum/nose bleeding  Musculoskel: negative for myalgias,  back pain and muscle weakness  Neurological:  negative for headaches, dizziness, vertigo  Behavl/Psych:  negative for feelings of anxiety, depression     Pertinent Positives include :    Objective:   VITALS:    Visit Vitals    /73 (BP 1 Location: Left arm)    Pulse 91    Temp 100 °F (37.8 °C)    Resp 20    Ht 5' 2\" (1.575 m)    Wt 81.2 kg (179 lb)    SpO2 100%    BMI 32.74 kg/m2     Temp (24hrs), Av °F (37.8 °C), Min:100 °F (37.8 °C), Max:100 °F (37.8 °C)      PHYSICAL EXAM:   General:    Sleeping (per care giver, difficult around when asleep, vitals stable)     Head:   Normocephalic, without obvious abnormality, atraumatic. Eyes:   Conjunctivae clear, anicteric sclerae. Pupils are equal  Nose:  Nares normal. No drainage or sinus tenderness. Throat:    Lips, mucosa, and tongue normal.  No Thrush  Neck:  Supple, symmetrical,  no adenopathy, thyroid: non tender    no carotid bruit and no JVD. Back:    Symmetric,  No CVA tenderness. Lungs:   Clear to auscultation bilaterally. No Wheezing or Rhonchi. No rales. Chest wall:  No tenderness or deformity. No Accessory muscle use. Heart:   Regular rate and rhythm,  no murmur, rub or gallop. Abdomen:   Soft, non-tender. Not distended. Bowel sounds normal. No masses  Extremities: Left LE redness and warmth in thigh area, atraumatic, No cyanosis. No edema. No clubbing  Skin:     Texture, turgor normal. No rashes or lesions.   Not Jaundiced  Lymph nodes: Cervical, supraclavicular normal.  Psych:  Hx of downs syndrome and cognitive impairment   Neurologic: Unable to assess as she is sleeping        LAB DATA REVIEWED:    No results found for: [address]  [facility]  Recent Labs      17   1829   NA  137   K  3.9   CL  102   CO2  23   BUN  16   CREA  1.17   GLU  167*   CA  8.1*   ALB  2.7*   WBC  9.0   HGB  13.5   HCT  39.1   PLT  210         IMAGING RESULTS:   []  I have personally reviewed the actual   []CXR  []CT scan    CXR:  CT :    Assessment/Plan:      Principal Problem:    Left leg cellulitis (6/28/2017)    Active Problems:    UTI (urinary tract infection) (8/26/2016)      Down's syndrome (8/26/2016)      Alzheimer's dementia (6/28/2017)      HTN (hypertension) (6/28/2017)      HLD (hyperlipidemia) (6/28/2017)      GERD (gastroesophageal reflux disease) (6/28/2017)      Cellulitis (6/28/2017)      Lactic acid acidosis (6/28/2017)       ___________________________________________________  PLAN:    Risk of deterioration:  []Low    []Moderate  [x]High    1. Left LE Cellulitis   2. UTI  3. Lactic Acidosis; likely from infection   4. Downs Syndrome   5. Alzheimer's dementia   6. GERD  7. HLD  8. Seizures  FULL CODE     -admit for further care to medical bed   -pan culture, Vanc and rocephin started. WIll cont for now   -neurochecks, bed rest   -follow up urine cultures   -LE Dopplers to rule out DVT  -IVF, npo until more awake then pureed diet. Nutrition team consulted   -resume home po meds when more awake.   -supportive care     Called his POA/Legal Guardian, Gisel Randle (brother), but did not answer therefore left a voicemail.          Prophylaxis:  []Lovenox  []Coumadin  [x]Hep SQ  []SCDs  []H2B/PPI    Disposition:  [x]Home w/ Family   []HH PT,OT,RN   []SNF/LTC   []SAH/Rehab    Discussed Code Status:    [x]Full Code      []DNR     ___________________________________________________    Care Plan discussed with:    [x]Patient   []Family    []ED Care Manager  []ED Doc   []Specialist :    Total Time Coordinating Admission:   55   minutes    []Total Critical Care Time:     ___________________________________________________  Admitting Physician: Flo Epps MD

## 2017-06-29 NOTE — PROGRESS NOTES
Pt admitted due to LLE. Reviewed pt's history and noted pt with a history of Alzheimer's dementia, Downs syndrome and seizure disorder. Pt lives at [address] (Rustam paz, [address] [place]; [phone]; [phone])). Contacted this facility and they Monserrat Johnson has confirmed pt lives at this facility. Jaz Lobato is the DON of this facility and pt's guardian. CM to continue to follow and assist with transition back to this facility when medically stable. Readmission Risk Assessment:     Moderate Risk and [facility]P/[facility] patients    RRAT Score:  13-20    Initial Assessment:  Return to [facility]     Emergency Contact:   Samara Santana/guardian    Pertinent Medical Hx:     Alzheimer's dementia, Downs syndrome and seizure disorder. PCP/Specialists:  Jennifer Mcdaniels      Community Services:   Group Home    DME:          Moderate Risk Care Transition Plan:  1. Evaluate for [facility], SNF, acute rehab, community care coordination of resources. 2. Involve patient/caregiver in assessment, planning, education and implement of intervention. 3. CM daily patient care huddles/interdisciplinary rounds. 4. PCP/Specialist appointment within 5  7 days made prior to discharge. 5. Facilitate transportation and logistics for follow-up appointments. 6. Medication reconciliation [address]  7. Formal handoff between hospital provider and post-acute provider to transition patient  Handoff to [facility] Nurse Navigator or PCP practice. Care Management Interventions  PCP Verified by CM: Yes  Mode of Transport at Discharge: Other (see comment) (Group Home)  Transition of Care Consult (CM Consult): Group Home  Health Maintenance Reviewed: Yes  Current Support Network: Adult Group Home  Confirm Follow Up Transport: [address] discussed with Pt/Family/Caregiver:  Yes

## 2017-06-29 NOTE — INTERDISCIPLINARY ROUNDS
Interdisciplinary Round Note   Patient Information: Nikki Harrell                                      329/01 Reason for Admission: Cellulitis of thigh  Cellulitis of buttock  Cellulitis  UTI (urinary tract infection)  Quality Measure: Not applicable            Attending Provider:   Saravanan Balderas MD  Primary Care Physician:       Robbi Ruby MD       [phone]  Consulting:  IP CONSULT TO HOSPITALIST  IDR Rounding Physician:  Past Medical History:   Past Medical History:   Diagnosis Date    Alzheimer's dementia     Down's syndrome     Seizure [facility])         Hospital day: 1                          - - -  Estimated discharge date:   RRAT Score: Medium Risk            16       Total Score        4 More than 1 Admission in calendar year    9 Patient Insurance is Medicare, Medicaid or Self Pay    3 Charlson Comorbidity Score        Criteria that do not apply:    Relationship with PCP    Patient Living Status    Patient Length of Stay > 5         Primary Goals for Today: DVT process    Secondary Goals for Today: no falls    Overnight Events: none    Fish: no    Central Line: no    Isolation: HD - Risperdal       IV Antibiotics?  Vanc, rocephin       When started: 6/28  Current Medication List:          Current Facility-Administered Medications   Medication Dose Route Frequency    sodium chloride (NS) flush 5-10 mL  5-10 mL IntraVENous PRN    cefTRIAXone (ROCEPHIN) 1 g in 0.9% sodium chloride (MBP/ADV) 50 mL MBP  1 g IntraVENous Q24H    Vancomycin - Pharmacy to Dose  1 Each Other Rx Dosing/Monitoring    citalopram (CELEXA) tablet 20 mg  20 mg Oral DAILY    fluticasone (FLONASE) 50 mcg/actuation nasal spray 2 Spray  2 Spray Both Nostrils DAILY    Arginine-Glutamine-Calcium HMB 7-7-1.5 gram pwpk 1 Packet  1 Packet Oral Per Protocol    LORazepam (ATIVAN) tablet 1 mg  1 mg Oral Q4H PRN    risperiDONE (RisperDAL) tablet 1 mg  1 mg Oral DAILY    acetaminophen (TYLENOL) tablet 650 mg  650 mg Oral Q6H PRN    bisacodyl (DULCOLAX) suppository 10 mg  10 mg Rectal DAILY    loperamide (IMODIUM) capsule 2 mg  2 mg Oral QID PRN    methocarbamol (ROBAXIN) tablet 500 mg  500 mg Oral QID    aluminum-magnesium hydroxide (MAALOX) oral suspension 15 mL  15 mL Oral QID PRN    naproxen (NAPROSYN) tablet 500 mg  500 mg Oral BID WITH MEALS    guaiFENesin-dextromethorphan (ROBITUSSIN DM) 100-10 mg/5 mL syrup 5 mL  5 mL Oral QID PRN    divalproex (DEPAKOTE SPRINKLE) capsule 250 mg  250 mg Oral BID    pantoprazole (PROTONIX) tablet 40 mg  40 mg Oral BID    dextrose 5 % - 0.45% NaCl infusion  75 mL/hr IntraVENous CONTINUOUS    heparin (porcine) injection 5,000 Units  5,000 Units SubCUTAneous Q8H    vancomycin (VANCOCIN) 1,250 mg in 0.9% sodium chloride 250 mL IVPB  1,250 mg IntraVENous Q18H      VTE Prophylaxis                                 Lines, Drains, & Airways  Peripheral IV 06/28/17 Left Hand-Site Assessment: Clean, dry, & intact     Vital signs:  Visit Vitals    /63 (BP 1 Location: Left arm, BP Patient Position: At rest;Supine)    Pulse (!) 57    Temp 97.6 °F (36.4 °C)    Resp 18    Ht 5' 2\" (1.575 m)    Wt 81.2 kg (179 lb)    SpO2 100%    BMI 32.74 kg/m2        Intake and Output:      06/28 1901 - 06/29 0700  In: 1943 [I.V.:1943]  Out: 120 [Urine:120]  Last Bowel Movement Date: 06/29/17     Stool Appearance: Soft, Formed           Current Diet: DIET DYSPHAGIA PUREED (NDD1)       Abdominal   Last Bowel Movement Date: 06/29/17  Stool Appearance: Soft, Formed  Nutrition  Chewing/Swallowing Problems: Unknown  Difficulty with Secretions: Unknown  Speech Slurred/Thick/Garbled: Unknown    NGT: no    Feeding Tube: no   Recent Glucose Results:   Lab Results   Component Value Date/Time    GLU 90 06/29/2017 02:03 AM     (H) 06/28/2017 06:29 PM    GLUCPOC 91 06/29/2017 06:31 AM    GI Prophylaxis: no        Type: ***        Activity Level:   Activity Level: Bed Rest    Needs assistance with ADLs: yes  PT Consult Status: ***  Equipment: ***  Surgical/Ortho Notes: ***   Current Immunizations: There is no immunization history on file for this patient.   RRAT Score:     Readmit Risk Tool  Support Systems: Family member(s)   Recommendations:       Discharge Disposition: TBD, pending progress    Needs for Discharge: ***           Recommendations from IDR team: ***    Other Notes: ***

## 2017-06-29 NOTE — PROCEDURES
[facility]  *** FINAL REPORT ***    Name: Tom Fernandez  MRN: [id number]    Inpatient  : 30 Dec 1955  HIS Order #: [id number]  [id number] [facility] Visit #: [id number]  Date: 2017    TYPE OF TEST: Peripheral Venous Testing    REASON FOR TEST  Limb swelling    Right Leg:-  Deep venous thrombosis:           No  Superficial venous thrombosis:    No  Deep venous insufficiency:        Not examined  Superficial venous insufficiency: Not examined      INTERPRETATION/FINDINGS  Duplex images were obtained using 2-D gray scale, color flow, and  spectral Doppler analysis. Right leg :  1. Deep vein(s) visualized include the common femoral, deep femoral,  proximal femoral, mid femoral, distal femoral, popliteal(above knee),  popliteal(fossa), popliteal(below knee), posterior tibial and peroneal   veins. 2. No evidence of deep venous thrombosis detected in the veins  visualized. 3. No evidence of deep vein thrombosis in the contralateral common  femoral vein. 4. No evidence of superficial thrombosis detected. ADDITIONAL COMMENTS    I have personally reviewed the data relevant to the interpretation of  this  study. TECHNOLOGIST: Pravin Perez  Signed: 2017 12:21 PM    PHYSICIAN: Nathaniel Flowers.  Trevin Santillan MD  Signed: 2017 01:27 PM

## 2017-06-29 NOTE — PROGRESS NOTES
Problem: Dysphagia (Adult)  Goal: *Acute Goals and Plan of Care (Insert Text)  Recommendations:  Diet: Puree with Honey Thick Liquids. No straws. Meds: One at a time in puree. Aspiration Precautions  Oral Care TID  Other: small bites/sips; alternating bites/sips; slow rate of intake; feeder/supervision during meals    Goals: Patient will:  1. Tolerate PO trials with 0 s/s overt distress in 4/5 trials  2. Utilize compensatory swallow strategies/maneuvers (decrease bite/sip, size/rate, alt. liq/sol) with min cues in 4/5 trials  3. Perform oral-motor/laryngeal exercises to increase oropharyngeal swallow function with min cues  4. Complete an objective swallow study (i.e., MBSS) to assess swallow integrity, r/o aspiration, and determine of safest LRD, min A  SPEECH LANGUAGE PATHOLOGY BEDSIDE SWALLOW EVALUATION     Patient: Gaviota Brar (32 y.o. male)  Date: 6/29/2017  Primary Diagnosis: Cellulitis of thigh  Cellulitis of buttock  Cellulitis  UTI (urinary tract infection)        Precautions: Aspiration. ASSESSMENT :  Based on the objective data described below, the patient presents with moderate oropharyngeal dysphagia c/b delayed oral and pharyngeal swallow as well +overt s/sx of aspiration/penetration. Patient alert and ?O as patient refused communication with clinicians. Two food trays at bedside appeared to be untouched. 0 command following for oral motor examination. Noted R lingual deviation, fasciculation, tongue thrust at times, and poor dentition. Oral delay and residue noted with MS trials with delayed cough. Poor tolerated of thin and NTL +/- straws; resolved with honey thick via cup/sips only. Impulsive drinking behaviors, therefore the cup sips must be controlled. Patient at risk for aspiration as patient presents with: strong cough, facial redness, cognitive impairment to utilize strategies, and decreased oral motor strength and coordination.  Education re: dysphagia, diet textures/consistencies, safe swallow guidelines, positioning, and supervision/feeding needs. D/w RN Gonzalo Wilson and CNA. Patient will benefit from skilled intervention to address the above impairments. Patients rehabilitation potential is considered to be Fair  Factors which may influence rehabilitation potential include:   [X]            Mental ability/status  [X]            Medical condition       PLAN :  Recommendations and Planned Interventions:  Puree with Honey Thick Liquids. SLP to f/u for dysphagia management. Frequency/Duration: Patient will be followed by speech-language pathology 1-2 times per day for at least 3 days per week to address goals. Discharge Recommendations: Danny Herrera vs TBD       SUBJECTIVE:   Patient stated . Efren Newton . OBJECTIVE:       Past Medical History:   Diagnosis Date    Alzheimer's dementia      Down's syndrome      Seizure ([facility] [address]     No past surgical history on file. Prior Level of Function/Home Situation: Per pt/chart  Home Situation  Living Alone: No  Support Systems: Family member(s)  Diet prior to admission: unknown   Current Diet:  puree   Cognitive and Communication Status:  Neurologic State: Alert  Orientation Level: Unable to verbalize  Cognition: No command following  Perception: Tactile, Verbal, Visual  Perseveration: No perseveration noted  Safety/Judgement: Lack of insight into deficits  Oral Assessment:  Oral Assessment  Labial: Decreased seal  Dentition: Intact; Limited  Oral Hygiene: good  Lingual: Decreased strength;Decreased rate;Right deviation; Flaccid  Velum: Unable to visualize; Other (comment) (refused to follow commands)  Mandible: No impairment  P.O. Trials:  Patient Position: 61 HOB  Vocal quality prior to P.O.: No impairment  Consistency Presented: Thin liquid;Puree;Mechanical soft;Ground; Honey thick liquid; Nectar thick liquid  How Presented: Self-fed/presented;Cup/sip;Spoon;Straw;Successive swallows  How Much:  (Meal)  Bolus Acceptance: No impairment  Bolus Formation/Control: Impaired  Type of Impairment: Delayed;Mastication;Lip closure  Propulsion: Delayed (# of seconds)  Oral Residue: 10-50% of bolus; Lingual  Initiation of Swallow: Delayed (# of seconds)  Laryngeal Elevation: Functional  Aspiration Signs/Symptoms: Strong cough; Watery eyes; Facial redness;Delayed cough/throat clear  Pharyngeal Phase Characteristics: Multiple swallows; Poor endurance;Easily fatigued ; Suspected pharyngeal residue;Effortful swallow; Audible swallow  Effective Modifications: Cup/sip;Small sips and bites; Spoon  Cues for Modifications: Maximal  Comments: Supervision required     Oral Phase Severity: Moderate  Pharyngeal Phase Severity : Moderate     GCODESwallowing:  Swallow Current Status CK= 40-59%   Swallow Goal Status CK= 40-59%     The severity rating is based on the following outcomes:  YAMINI Noms Swallow Level 4              Clinical Judgement     PAIN:  Start of Eval: 0  End of Eval: 0      After treatment:   [ ]            Patient left in no apparent distress sitting up in chair  [X]            Patient left in no apparent distress in bed  [X]            Call bell left within reach  [X]            Nursing notified  [ ]            Family present  [ ]            Caregiver present  [ ]            Bed alarm activated      COMMUNICATION/EDUCATION:   [X]            Aspiration precautions; swallow safety; compensatory techniques. [X]            Patient/family have participated as able in goal setting and plan of care. [X]            Patient/family agree to work toward stated goals and plan of care. [ ]            Patient understands intent and goals of therapy; neutral about participation. [X]            Patient unable to participate in goal setting/plan of care; educ ongoing with interdisciplinary staff  [X]            Posted safety precautions in patient's room.      Thank you for this referral.  SAMAN Herrera  Time Calculation: 20 mins

## 2017-06-29 NOTE — PROGRESS NOTES
Hospitalist Progress Note-critical care note     Patient: Yolanda Mays MRN: [id number]  CSN: [id number]    YOB: 1955  Age: 62 y.o. Sex: male    DOA: 6/28/2017 LOS:  LOS: 1 day            Chief complaint: cellulitis , uti. Dementia ,     Assessment/Plan         Patient Active Problem List   Diagnosis Code    Pneumonia J18.9    UTI (urinary tract infection) N39.0    Hypoxia R09.02    Upper abdominal pain R10.10    Mental retardation F79    Down's syndrome Q90.9    Alzheimer's dementia G30.9    HTN (hypertension) I10    HLD (hyperlipidemia) E78.5    GERD (gastroesophageal reflux disease) K21.9    Cellulitis L03.90    Lactic acid acidosis E87.2       1. Cellulitis of rt LE   Will continue vanc and rocephin   Marking the lesion   2. UTI  On rocephin   Will f/u with urine cx   3. Lactic Acidosis; likely from infection   Resolved   4. Downs Syndrome   On resperdal  Fall precaution   5. Alzheimer's dementia   Will have speech evaluation   6. GERD  On ppi   7. HLD  8. Seizures  Continue depakote , seizure precation   FULL CODE     Geeta Knott [phone]     I was told the number is not in service. Not sure his baseline, he was woke up from sleep, but not answering questions,, unable to contact the  Guardian        35 total min's spent on patient care. Review of systems:      Unable to obtain, not talking     Vital signs/Intake and Output:  Visit Vitals    /66 (BP 1 Location: Left arm, BP Patient Position: At rest)    Pulse 60    Temp 98 °F (36.7 °C)    Resp 18    Ht 5' 2\" (1.575 m)    Wt 81.2 kg (179 lb)    SpO2 100%    BMI 32.74 kg/m2     Current Shift:  06/29 0701 - 06/29 1900  In: 120 [P.O.:120]  Out: -   Last three shifts:  06/27 1901 - 06/29 0700  In: 1943 [I.V.:1943]  Out: 120 [Urine:120]    Physical Exam:  General: Was sleeping.-wake him up not verbal , not follow the command . no acute distress    HEENT: NC, Atraumatic.   PERRLA, anicteric sclerae. Lungs: CTA Bilaterally. No Wheezing/Rhonchi/Rales. Heart:  Regular  rhythm,  No murmur, No Rubs, No Gallops  Abdomen: Soft, Non distended, Non tender.  +Bowel sounds,   Extremities: No c/c. Irregular erythema noted, warm   Psych:   Not anxious or agitated. Neurologic:  Unable to obtain           Labs: Results:       Chemistry Recent Labs      06/29/17 0203 06/28/17   1829   GLU  90  167*   NA  141  137   K  4.0  3.9   CL  108  102   CO2  26  23   BUN  13  16   CREA  1.02  1.17   CA  7.7*  8.1*   AGAP  7  12   BUCR  13  14   AP  46  54   TP  6.3*  7.3   ALB  2.2*  2.7*   GLOB  4.1*  4.6*   AGRAT  0.5*  0.6*      CBC w/Diff Recent Labs      06/29/17 0203 06/28/17 1829   WBC  6.7  9.0   RBC  3.54*  3.94*   HGB  11.9*  13.5   HCT  35.6*  39.1   PLT  180  210   GRANS   --   74*   LYMPH   --   15*   EOS   --   1      Cardiac Enzymes No results for input(s): CPK, CKND1, ALDAIR in the last 72 hours. No lab exists for component: CKRMB, TROIP   Coagulation Recent Labs      06/29/17 0203   PTP  13.2   INR  1.0   APTT  33.3       Lipid Panel Lab Results   Component Value Date/Time    Cholesterol, total 140 05/12/2016 09:59 AM    HDL Cholesterol 46 05/12/2016 09:59 AM    LDL, calculated 75.8 05/12/2016 09:59 AM    VLDL, calculated 18.2 05/12/2016 09:59 AM    Triglyceride 91 05/12/2016 09:59 AM    CHOL/HDL Ratio 3.0 05/12/2016 09:59 AM      BNP No results for input(s): BNPP in the last 72 hours.    Liver Enzymes Recent Labs      06/29/17 0203   TP  6.3*   ALB  2.2*   AP  46   SGOT  19      Thyroid Studies No results found for: T4, T3U, TSH, TSHEXT     Procedures/imaging: see electronic medical records for all procedures/Xrays and details which were not copied into this note but were reviewed prior to creation of Azeb Cruz MD

## 2017-06-29 NOTE — PROGRESS NOTES
INITIAL NUTRITION ASSESSMENT     RECOMMENDATIONS/PLAN:   ? Need for SLP eval to determine maximum tolerated safe diet consistency    Will order ensure enlive BID to aid in meeting estimated needs  Continue to encourage PO intakes >75% throughout the next 5 days  Monitor labs/lytes, fluid status, weight, bowel function, skin integrity  Will continue to follow and adjust reccs as needed    REASON FOR ASSESSMENT:   [x]  MD Consult:    [x] General Nutrition Management and Supplements   [] Management of Tube Feeding   [] Calorie Count    [] Diet Education     NUTRITION ASSESSMENT:   Client History: 62 yrs old Male admitted with LLE redness, hx of AMS.  Pt found to have cellulitis, UTI  PMHx: alzheimer's disease, down syndrome, seizure   Cultural/Rastafari Food Preferences: None Identified    FOOD/NUTRITION HISTORY  Diet History: unable to assess hx at this time   Food Allergies:  [x] NKFA       Pertinent PTA Medications: protonix, omega 3, biscolax, Eduardo, immodium    NUTRITION INTAKE   Diet Order:  pureed   Average PO Intake:       Patient Vitals for the past 100 hrs:   % Diet Eaten   06/29/17 0858 75 %      Pertinent Medications:  [x] Reviewed; dulcolax, protonix    Insulin:  [] SSI  [] Pre-meal   []  Basal   [] Drip  [x] None  Pt expected to meet estimated nutrient needs through next review:          [x]  Yes     PO intakes >75% meets estimated needs ANTHROPOMETRICS  Height: 5' 2\" (157.5 cm)       Weight: 81.2 kg (179 lb)    BMI: 32.7 kg/m^2  -  obese (30%-39.9% BMI)        Weight change: chart hx reviewed, no evidence of wt loss PTA                                  Comparison to Reference Standards:  IBW: 110lbs      %IBW: 163%      AdjBW: 57.8 kg    NUTRITION-FOCUSED PHYSICAL ASSESSMENT  Skin: no pressure area per documentation     GI: BM 6/29    BIOCHEMICAL DATA & MEDICAL TESTS  Pertinent Labs:  [x] Reviewed    NUTRITION PRESCRIPTION  Calories: 1949 kcal/day based on miffilin x 1.3  Protein: 75-86 g/day based on 1.3-1.5 g/kg ABW  CHO: 243 g/day based on 50% of total energy  Fluid: 1949 ml/day based on 1 kcal/ml      NUTRITION DIAGNOSES:   1. Increased estimated energy needs related to current infection as evidenced by pt with LLE cellulitis    NUTRITION INTERVENTIONS:   INTERVENTIONS:        GOALS:  1. Ensure BID 1. PO intakes >75% of meals and supplements throughout the next 5-7 days     LEARNING NEEDS (Diet, Supplementation, Food/Nutrient-Drug Interaction):   [x] None Identified  [] Inpatient education provided/documented    [] Identified and patient:  [] Declined     [x] Was not appropriate/indicated  NUTRITION MONITORING /EVALUATION:   Follow PO intake  Monitor wt  Monitor renal labs, electrolytes, fluid status  Monitor for additional supplement needs    [] Participated in Interdisciplinary Rounds  [x] [facility] Reviewed/Documented  DISCHARGE NUTRITION RECOMMENDATIONS ADDRESSED:           [x] To be determined closer to discharge    NUTRITION RISK:     [x]  At risk                     []  Not currently at risk     Will follow-up per policy.   Mark Ellis, [address] [place] [address]  PAGER:  [phone]

## 2017-06-29 NOTE — ROUTINE PROCESS
Bedside and Verbal shift change report given to RASTA Menard RN (oncoming nurse) by Judit Felix RN  (offgoing nurse). Report included the following information SBAR, Intake/Output and MAR.

## 2017-06-29 NOTE — ED NOTES
Bedside report completed with Sirisha Ramirez RN Medical unit; mews score 1    TRANSFER - OUT REPORT:    Verbal report given to Garland Good RN(name) on Arzella Merlin  being transferred to Medical(unit) for routine progression of care       Report consisted of patients Situation, Background, Assessment and   Recommendations(SBAR). Information from the following report(s) SBAR, Kardex, ED Summary, Intake/Output, MAR and Recent Results was reviewed with the receiving nurse. Lines:   Peripheral IV 06/28/17 Left Hand (Active)   Site Assessment Clean, dry, & intact 6/28/2017  6:27 PM   Phlebitis Assessment 0 6/28/2017  6:27 PM   Infiltration Assessment 0 6/28/2017  6:27 PM   Dressing Status Clean, dry, & intact 6/28/2017  6:27 PM   Dressing Type Transparent 6/28/2017  6:27 PM   Hub Color/Line Status Flushed;Patent 6/28/2017  6:27 PM        Opportunity for questions and clarification was provided.       Patient transported with:   [facility] [employer]

## 2017-06-29 NOTE — PROGRESS NOTES
conducted an initial consultation and Spiritual Assessment for Odalys Anthony, who is a 62 y.o.,male. Patients Primary Language is: [place]. According to the patients EMR Muslim Affiliation is: Unknown. The reason the Patient came to the hospital is:   Patient Active Problem List    Diagnosis Date Noted    Alzheimer's dementia 06/28/2017    HTN (hypertension) 06/28/2017    HLD (hyperlipidemia) 06/28/2017    GERD (gastroesophageal reflux disease) 06/28/2017    Cellulitis 06/28/2017    Lactic acid acidosis 06/28/2017    Pneumonia 08/26/2016    UTI (urinary tract infection) 08/26/2016    Hypoxia 08/26/2016    Upper abdominal pain 08/26/2016    Mental retardation 08/26/2016    Down's syndrome 08/26/2016      Assessment:  Patient does not have any Congregation/cultural needs that will affect patients preferences in health care. There are no further spiritual or Congregation issues which require intervention at this time. Plan:  Chaplains will continue to follow and will provide pastoral care on an as needed/requested basis.  recommends bedside caregivers page  on duty if patient shows signs of acute spiritual or emotional distress.       Sister Chelsie Castilol, [place], [address]  [phone]

## 2017-06-29 NOTE — PROGRESS NOTES

## 2017-06-29 NOTE — ED NOTES
Pt resting comfortably on stretcher, asleep, caregiver at bedside, states he is normally in bed to sleep by this time; pt has not provided urine via urinal at this time, it has been placed between legs by Seamus Butterfield RN previous shift and we have been checking on it;

## 2017-06-29 NOTE — PROGRESS NOTES
Pt Alley Severy, 63 yo male. Pt in bed, awake during bedside report. Pt has history to Down Syndrome, he is able to respond to simple questions with delayed response. He does not answer every question. Pt able to take pills with apple sauce, he coughs with thin liquids. 0900 Pt has difficulty with think liquids, he coughs after swallowing. He is able to tolerate pureed breakfast well. He closes his eyes, possibly falling asleep while eating. He occasionally holds food in his mouth. [address] for HD medications, not loaded in 3S Pyxis. 1040 Administered medication with apple sauce and thickened grape juice. Pt tolerated well, no coughing with 1 packet of nectar thickener. 1115 Pt transported to ultrasound for Doppler study. 1240 Pt returned to room. Sleeping. 1500 Pt in bed during bedside report. Pt about to have spa bath service.

## 2017-06-29 NOTE — PROGRESS NOTES
2240 Report received. Concern for lactic acid 3.1, and redrawn just prior to report. Recommended to wait for lactic acid result. NS bolus and rocephin infusing at this time. Appears comfortable. Visit Vitals    /48 (BP 1 Location: Left arm, BP Patient Position: At rest;Head of bed elevated (Comment degrees))    Pulse 65    Temp 97.8 °F (36.6 °C)    Resp 18    Ht 5' 2\" (1.575 m)    Wt 81.2 kg (179 lb)    SpO2 96%    BMI 32.74 kg/m2      2313 Pt's lactic acid is 0.9. Pt arrived to rm 329. Pt drowsy. Right thigh appears red and inflamed, and reaches to buttocks, perineal area, and left buttocks. Continued NS bolus. 2324 Vancomycin 1250 mg IV was not yet given, but order was discontinued. Informed pharmacy, who is to dose vancomycin. Per pharmacist Nghia Scanlon, give this dose now. Vanc infusion started. 0022 PO medications, depakote, protonix held due to NPO status until pt's neuro status improves (pt drowsy at this time). 0100 Bolus complete. Pt awake. Large BM with soft, brown, formed stool. Has not yet voided. [address] Brook Sylvester that pt is incontinent and will likely need straight cath for urine sample. Received order for one time prn straight cath to urine specimen and urine culture. Also informed Dr. Brook Sylvester that his note says Left leg cellulitis, but his right leg appears inflamed. 0860 Urine specimen obtained and sent to lab. SHIFT SUMMARY: Pt able to sleep for most of shift. Incontinent. Pt does not attempt to get up and has not triggered bed alarm. Sacrum skin intact. Placed on hazardous drug precaution for risperdal. Pt became more alert by morning, so diet was progressed to puree diet, as written per Dr. Brook Sylvester.

## 2017-06-29 NOTE — ROUTINE PROCESS
Bedside and Verbal shift change report given to JOHNNY Encinas (oncoming nurse) by Alyson Rodriguez RN (offgoing nurse). Report included the following information SBAR, Kardex, ED Summary, Procedure Summary, Intake/Output, MAR, Accordion, Recent Results and Med Rec Status.

## 2017-06-29 NOTE — PROGRESS NOTES
Bedside and Verbal shift change report given to JOHNNY Hernandez RN (oncoming nurse) by Kitty Claros RN (offgoing nurse). Report included the following information SBAR and Kardex.      Patient Vitals for the past 12 hrs:   Temp Pulse Resp BP SpO2   06/29/17 1928 98.2 °F (36.8 °C) (!) 48 16 95/54 -   06/29/17 1508 97.2 °F (36.2 °C) 68 18 94/51 98 %   06/29/17 0912 98 °F (36.7 °C) 60 18 115/66 100 %   06/29/17 0800 - - - - 100 %

## 2017-06-29 NOTE — PROGRESS NOTES
Pharmacy Dosing Services: Vancomycin    Consult for Vancomycin Dosing by Pharmacy by Dr. Kiet Sharma provided for this 62y.o. year old male , for indication of skin and soft tissue infection. Day of Therapy 1    Ht Readings from Last 1 Encounters:   06/28/17 157.5 cm (62\")        Wt Readings from Last 1 Encounters:   06/28/17 81.2 kg (179 lb)      Other Current Antibiotics Ceftriaxone 1 gram IV q24h   Significant Cultures pending   Serum Creatinine Lab Results   Component Value Date/Time    Creatinine 1.17 06/28/2017 06:29 PM      Creatinine Clearance Estimated Creatinine Clearance: 61.9 mL/min (based on Cr of 1.17). BUN Lab Results   Component Value Date/Time    BUN 16 06/28/2017 06:29 PM      WBC Lab Results   Component Value Date/Time    WBC 9.0 06/28/2017 06:29 PM      H/H Lab Results   Component Value Date/Time    HGB 13.5 06/28/2017 06:29 PM      Platelets Lab Results   Component Value Date/Time    PLATELET 780 79/78/0996 06:29 PM      Temp 100 °F (37.8 °C)     Start Vancomycin therapy, Vancomycin 1250 mg IV every 18 hours, starting on 6/28/17 at 22:00. Dose calculated to approximate a therapeutic trough of 10 - 15 mcg/mL. Pharmacy to follow daily and will make changes to dose and/or frequency based on clinical status.   Pharmacist Denilson Gibbons

## 2017-06-30 LAB
ANION GAP BLD CALC-SCNC: 10 MMOL/L (ref 3–18)
BACTERIA SPEC CULT: NORMAL
BUN SERPL-MCNC: 7 MG/DL (ref 7–18)
BUN/CREAT SERPL: 8 (ref 12–20)
CALCIUM SERPL-MCNC: 7.9 MG/DL (ref 8.5–10.1)
CHLORIDE SERPL-SCNC: 108 MMOL/L (ref 100–108)
CO2 SERPL-SCNC: 25 MMOL/L (ref 21–32)
CREAT SERPL-MCNC: 0.9 MG/DL (ref 0.6–1.3)
GLUCOSE BLD STRIP.AUTO-MCNC: 110 MG/DL (ref 70–110)
GLUCOSE BLD STRIP.AUTO-MCNC: 85 MG/DL (ref 70–110)
GLUCOSE BLD STRIP.AUTO-MCNC: 93 MG/DL (ref 70–110)
GLUCOSE SERPL-MCNC: 91 MG/DL (ref 74–99)
POTASSIUM SERPL-SCNC: 3.7 MMOL/L (ref 3.5–5.5)
SERVICE CMNT-IMP: NORMAL
SODIUM SERPL-SCNC: 143 MMOL/L (ref 136–145)

## 2017-06-30 PROCEDURE — 82962 GLUCOSE BLOOD TEST: CPT

## 2017-06-30 PROCEDURE — 74011000258 HC RX REV CODE- 258: Performed by: INTERNAL MEDICINE

## 2017-06-30 PROCEDURE — 80048 BASIC METABOLIC PNL TOTAL CA: CPT | Performed by: INTERNAL MEDICINE

## 2017-06-30 PROCEDURE — 74011250636 HC RX REV CODE- 250/636: Performed by: INTERNAL MEDICINE

## 2017-06-30 PROCEDURE — 36415 COLL VENOUS BLD VENIPUNCTURE: CPT | Performed by: INTERNAL MEDICINE

## 2017-06-30 PROCEDURE — 92526 ORAL FUNCTION THERAPY: CPT

## 2017-06-30 PROCEDURE — 74011250637 HC RX REV CODE- 250/637: Performed by: INTERNAL MEDICINE

## 2017-06-30 PROCEDURE — 65270000029 HC RM PRIVATE

## 2017-06-30 RX ADMIN — VANCOMYCIN HYDROCHLORIDE 1250 MG: 10 INJECTION, POWDER, LYOPHILIZED, FOR SOLUTION INTRAVENOUS at 15:41

## 2017-06-30 RX ADMIN — CITALOPRAM HYDROBROMIDE 20 MG: 20 TABLET ORAL at 08:45

## 2017-06-30 RX ADMIN — RISPERIDONE 1 MG: 0.5 TABLET, FILM COATED ORAL at 08:45

## 2017-06-30 RX ADMIN — PANTOPRAZOLE SODIUM 40 MG: 40 TABLET, DELAYED RELEASE ORAL at 23:13

## 2017-06-30 RX ADMIN — NAPROXEN 500 MG: 250 TABLET ORAL at 08:45

## 2017-06-30 RX ADMIN — BISACODYL 10 MG: 10 SUPPOSITORY RECTAL at 08:46

## 2017-06-30 RX ADMIN — HEPARIN SODIUM 5000 UNITS: 5000 INJECTION, SOLUTION INTRAVENOUS; SUBCUTANEOUS at 15:42

## 2017-06-30 RX ADMIN — HEPARIN SODIUM 5000 UNITS: 5000 INJECTION, SOLUTION INTRAVENOUS; SUBCUTANEOUS at 08:46

## 2017-06-30 RX ADMIN — FLUTICASONE PROPIONATE 2 SPRAY: 50 SPRAY, METERED NASAL at 15:44

## 2017-06-30 RX ADMIN — HEPARIN SODIUM 5000 UNITS: 5000 INJECTION, SOLUTION INTRAVENOUS; SUBCUTANEOUS at 00:00

## 2017-06-30 RX ADMIN — METHOCARBAMOL 500 MG: 500 TABLET ORAL at 18:32

## 2017-06-30 RX ADMIN — HEPARIN SODIUM 5000 UNITS: 5000 INJECTION, SOLUTION INTRAVENOUS; SUBCUTANEOUS at 23:16

## 2017-06-30 RX ADMIN — DIVALPROEX SODIUM 250 MG: 125 CAPSULE, COATED PELLETS ORAL at 23:13

## 2017-06-30 RX ADMIN — DIVALPROEX SODIUM 250 MG: 125 CAPSULE, COATED PELLETS ORAL at 08:45

## 2017-06-30 RX ADMIN — CEFTRIAXONE 1 G: 1 INJECTION, POWDER, FOR SOLUTION INTRAMUSCULAR; INTRAVENOUS at 23:14

## 2017-06-30 RX ADMIN — METHOCARBAMOL 500 MG: 500 TABLET ORAL at 08:46

## 2017-06-30 RX ADMIN — PANTOPRAZOLE SODIUM 40 MG: 40 TABLET, DELAYED RELEASE ORAL at 08:45

## 2017-06-30 RX ADMIN — METHOCARBAMOL 500 MG: 500 TABLET ORAL at 15:43

## 2017-06-30 RX ADMIN — NAPROXEN 500 MG: 250 TABLET ORAL at 18:32

## 2017-06-30 RX ADMIN — METHOCARBAMOL 500 MG: 500 TABLET ORAL at 23:14

## 2017-06-30 RX ADMIN — DEXTROSE MONOHYDRATE AND SODIUM CHLORIDE 75 ML/HR: 5; .45 INJECTION, SOLUTION INTRAVENOUS at 03:59

## 2017-06-30 NOTE — PROGRESS NOTES
D/C Plan: [facility] ([place]am [place], [address]; [phone])  07/03/2017     Contacted [facility] and spoke with Conrad Mon at this facility. He has indicated pt is wheel chair bound. Per IDR plan is for return to pt's group home Monday (7/3/17). Notified Conrad Mon with [facility] and they will  this pt and transition him back to this facility. Anticipate pt will be discharged Monday to [address] ([address], [address] [facility] [address] [place]; [phone]). Facility will pick pt up at approximately 2:30pm Monday if medically stable. CM to continue to follow and assist with plan of care needs.

## 2017-06-30 NOTE — PROGRESS NOTES
Problem: Dysphagia (Adult)  Goal: *Acute Goals and Plan of Care (Insert Text)  Recommendations:  Diet: Puree with Honey Thick Liquids. No straws. Meds: One at a time in puree. Aspiration Precautions  Oral Care TID  Other: small bites/sips; alternating bites/sips; slow rate of intake; feeder/supervision during meals    Goals: Patient will:  1. Tolerate PO trials with 0 s/s overt distress in 4/5 trials  2. Utilize compensatory swallow strategies/maneuvers (decrease bite/sip, size/rate, alt. liq/sol) with min cues in 4/5 trials  3. Perform oral-motor/laryngeal exercises to increase oropharyngeal swallow function with min cues  4. Complete an objective swallow study (i.e., MBSS) to assess swallow integrity, r/o aspiration, and determine of safest LRD, min A     Outcome: Progressing Towards Goal  SPEECH LANGUAGE PATHOLOGY DYSPHAGIA TREATMENT     Patient: Sherry Vazquez (80 y.o. male)  Date: 6/30/2017  Diagnosis: Cellulitis of thigh  Cellulitis of buttock  Cellulitis  UTI (urinary tract infection) Cellulitis       Precautions: Aspiration. ASSESSMENT:  Moderate oropharyngeal dysphagia. Patient alert and ?O as patient more verbal without answering specific questions or following commands. HOB>30*, unable to adjust posture as patient confused and unwilling despite tactile cues. Honey thick liquids trialed via cup/sip and straw without event. Continues to present with impulsive drinking behaviors resulting strong cough, facial redness, and increased RR during PO of NTL via straw. Will continue to assess tolerance of current diet and potential for diet advancement. Education re: dysphagia and  thickened liquids provided. No evidence of learning.    Progression toward goals:  [ ]         Improving appropriately and progressing toward goals  [X]         Improving slowly and progressing toward goals  [ ]         Not making progress toward goals and plan of care will be adjusted       PLAN:  Recommendations and Planned Interventions:  Puree with HTL. No straws. SLP to f/u for dysphagia management. Patient continues to benefit from skilled intervention to address the above impairments. Continue treatment per established plan of care. Discharge Recommendations:  Skilled Nursing Facility       SUBJECTIVE:   Patient stated Bye. OBJECTIVE:   Cognitive and Communication Status:  Neurologic State: Alert  Orientation Level: Unable to verbalize  Cognition: No command following, Impulsive  Perception: Tactile, Verbal, Visual  Perseveration: No perseveration noted  Safety/Judgement: Lack of insight into deficits  Dysphagia Treatment:  Oral Assessment:  Oral Assessment  Labial: Decreased seal  Dentition: Intact, Limited  Oral Hygiene: good  Lingual: Decreased strength, Decreased rate, Right deviation, Flaccid  Velum: Unable to visualize, Other (comment) (refused to follow commands)  Mandible: No impairment  P.O.  Trials:              Patient Position: > 30 HOB              Vocal quality prior to P.O.: No impairment              Consistency Presented: Nectar thick liquid, Honey thick liquid              How Presented: SLP-fed/presented, Straw, Cup/sip, Successive swallows              How Much:  (x4oz NTL; x2 oz thin)              Bolus Acceptance: No impairment              Bolus Formation/Control: Impaired              Type of Impairment: Delayed              Propulsion: Delayed (# of seconds), Tongue pumping, Other (comment) (tongue thrust)              Oral Residue: None              Initiation of Swallow: Delayed (# of seconds)              Laryngeal Elevation: Functional, Decreased              Aspiration Signs/Symptoms: Delayed cough/throat clear, Increase in RR, Watery eyes, Facial redness              Pharyngeal Phase Characteristics: Multiple swallows, Suspected pharyngeal residue              Effective Modifications: Cup/sip              Cues for Modifications:  (hand over hand; feeding assistance)              Comments: Supervision required                 Oral Phase Severity: Moderate              Pharyngeal Phase Severity : Moderate     PAIN:  Start of Tx: 0 reported   End of Tx: 0 reported      After treatment:   [ ]              Patient left in no apparent distress sitting up in chair  [X]              Patient left in no apparent distress in bed  [X]              Call bell left within reach  [ ]              Nursing notified  [ ]              Family present  [ ]              Caregiver present  [ ]              Bed alarm activated         COMMUNICATION/EDUCATION:   [X]        Aspiration precautions; swallow safety; compensatory techniques  [ ]        Patient unable to participate in education; education ongoing with staff  [X]        Posted safety precautions in patient's room.   [ ]         Oral-motor/laryngeal strengthening exercises        SAMAN New  Time Calculation: 8 mins

## 2017-06-30 NOTE — PROGRESS NOTES
Hospitalist Progress Note-critical care note     Patient: Nataly Joe MRN: [id number]  CSN: [id number]    YOB: 1955  Age: 62 y.o. Sex: male    DOA: 6/28/2017 LOS:  LOS: 2 days            Chief complaint: cellulitis , uti. Dementia ,     Assessment/Plan         Patient Active Problem List   Diagnosis Code    Pneumonia J18.9    UTI (urinary tract infection) N39.0    Hypoxia R09.02    Upper abdominal pain R10.10    Mental retardation F79    Down's syndrome Q90.9    Alzheimer's dementia G30.9    HTN (hypertension) I10    HLD (hyperlipidemia) E78.5    GERD (gastroesophageal reflux disease) K21.9    Cellulitis L03.90    Lactic acid acidosis E87.2       1. Cellulitis of rt LE   Improving  Will continue vanc and rocephin   Marking the lesion     2. UTI  On rocephin   UA :MIXED GRAM POSITIVE RHONDA    3. Lactic Acidosis; likely from infection   Resolved     4. Downs Syndrome   On resperdal  Fall precaution   Pt/ot     5. Alzheimer's dementia   speech evaluation   On dysphagia diet   Aspiration precaution     6. GERD  On ppi   7. HLD  8. Seizures  Continue depakote , seizure precation   FULL CODE       Nurse : no acute issue   Review of systems:      Unable to obtain due to dementia     Vital signs/Intake and Output:  Visit Vitals    /63 (BP 1 Location: Left arm, BP Patient Position: At rest)    Pulse 69    Temp 98.2 °F (36.8 °C)    Resp 18    Ht 5' 2\" (1.575 m)    Wt 80.1 kg (176 lb 8 oz)    SpO2 96%    BMI 32.28 kg/m2     Current Shift:     Last three shifts:  06/28 1901 - 06/30 0700  In: 2063 [P.O.:120; I.V.:1943]  Out: 120 [Urine:120]    Physical Exam:  General:         Alert , not follow the command . no acute distress    HEENT: NC, Atraumatic. PERRLA, anicteric sclerae. Lungs: CTA Bilaterally. No Wheezing/Rhonchi/Rales. Heart:  Regular  rhythm,  No murmur, No Rubs, No Gallops  Abdomen: Soft, Non distended, Non tender.  +Bowel sounds,   Extremities: No c/c.  Irregular erythema noted, warm -mild improving   Psych:   Not anxious or agitated. Neurologic:  Unable to obtain           Labs: Results:       Chemistry Recent Labs      06/30/17   0530  06/29/17   0203  06/28/17   1829   GLU  91  90  167*   NA  143  141  137   K  3.7  4.0  3.9   CL  108  108  102   CO2  25  26  23   BUN  7  13  16   CREA  0.90  1.02  1.17   CA  7.9*  7.7*  8.1*   AGAP  10  7  12   BUCR  8*  13  14   AP   --   46  54   TP   --   6.3*  7.3   ALB   --   2.2*  2.7*   GLOB   --   4.1*  4.6*   AGRAT   --   0.5*  0.6*      CBC w/Diff Recent Labs      06/29/17   0203  06/28/17   1829   WBC  6.7  9.0   RBC  3.54*  3.94*   HGB  11.9*  13.5   HCT  35.6*  39.1   PLT  180  210   GRANS   --   74*   LYMPH   --   15*   EOS   --   1      Cardiac Enzymes No results for input(s): CPK, CKND1, ALDAIR in the last 72 hours. No lab exists for component: CKRMB, TROIP   Coagulation Recent Labs      06/29/17   0203   PTP  13.2   INR  1.0   APTT  33.3       Lipid Panel Lab Results   Component Value Date/Time    Cholesterol, total 140 05/12/2016 09:59 AM    HDL Cholesterol 46 05/12/2016 09:59 AM    LDL, calculated 75.8 05/12/2016 09:59 AM    VLDL, calculated 18.2 05/12/2016 09:59 AM    Triglyceride 91 05/12/2016 09:59 AM    CHOL/HDL Ratio 3.0 05/12/2016 09:59 AM      BNP No results for input(s): BNPP in the last 72 hours.    Liver Enzymes Recent Labs      06/29/17   0203   TP  6.3*   ALB  2.2*   AP  46   SGOT  19      Thyroid Studies No results found for: T4, T3U, TSH, TSHEXT, TSHEXT     Procedures/imaging: see electronic medical records for all procedures/Xrays and details which were not copied into this note but were reviewed prior to creation of Linsey Tobar MD

## 2017-06-30 NOTE — PROGRESS NOTES
Occupational Therapy Screening:  Services are indicated. Evaluate and Treat Order is requested. An InBasket screening referral was triggered for occupational therapy based on results obtained during the nursing admission assessment. The patients chart was reviewed and the patient is appropriate for a skilled therapy evaluation. Patient with Downs Syndrome and dementia. Per SLT, pt with great difficulty with self-feeding. Please order a consult for occupational therapy if you are in agreement and would like an evaluation to be completed. Thank you.     Stacey Arndt, OTR/L

## 2017-06-30 NOTE — ROUTINE PROCESS
Bedside, Verbal and Written shift change report given to JOHNNY Galarza RN (oncoming nurse) by Carmita Trevino. Talon Mcfadden RN (offgoing nurse). Report included the following information SBAR, Kardex, Procedure Summary and MAR.

## 2017-07-01 LAB
ANION GAP BLD CALC-SCNC: 8 MMOL/L (ref 3–18)
BASOPHILS # BLD AUTO: 0.1 K/UL (ref 0–0.06)
BASOPHILS # BLD: 1 % (ref 0–2)
BUN SERPL-MCNC: 5 MG/DL (ref 7–18)
BUN/CREAT SERPL: 5 (ref 12–20)
CALCIUM SERPL-MCNC: 7.8 MG/DL (ref 8.5–10.1)
CHLORIDE SERPL-SCNC: 106 MMOL/L (ref 100–108)
CO2 SERPL-SCNC: 28 MMOL/L (ref 21–32)
CREAT SERPL-MCNC: 0.99 MG/DL (ref 0.6–1.3)
DIFFERENTIAL METHOD BLD: ABNORMAL
EOSINOPHIL # BLD: 0.2 K/UL (ref 0–0.4)
EOSINOPHIL NFR BLD: 4 % (ref 0–5)
ERYTHROCYTE [DISTWIDTH] IN BLOOD BY AUTOMATED COUNT: 12.9 % (ref 11.6–14.5)
GLUCOSE BLD STRIP.AUTO-MCNC: 104 MG/DL (ref 70–110)
GLUCOSE BLD STRIP.AUTO-MCNC: 107 MG/DL (ref 70–110)
GLUCOSE BLD STRIP.AUTO-MCNC: 107 MG/DL (ref 70–110)
GLUCOSE BLD STRIP.AUTO-MCNC: 79 MG/DL (ref 70–110)
GLUCOSE BLD STRIP.AUTO-MCNC: 88 MG/DL (ref 70–110)
GLUCOSE SERPL-MCNC: 80 MG/DL (ref 74–99)
HCT VFR BLD AUTO: 34.1 % (ref 36–48)
HGB BLD-MCNC: 11.5 G/DL (ref 13–16)
LYMPHOCYTES # BLD AUTO: 40 % (ref 21–52)
LYMPHOCYTES # BLD: 1.9 K/UL (ref 0.9–3.6)
MCH RBC QN AUTO: 33.5 PG (ref 24–34)
MCHC RBC AUTO-ENTMCNC: 33.7 G/DL (ref 31–37)
MCV RBC AUTO: 99.4 FL (ref 74–97)
MONOCYTES # BLD: 0.6 K/UL (ref 0.05–1.2)
MONOCYTES NFR BLD AUTO: 12 % (ref 3–10)
NEUTS SEG # BLD: 2.1 K/UL (ref 1.8–8)
NEUTS SEG NFR BLD AUTO: 43 % (ref 40–73)
PLATELET # BLD AUTO: 211 K/UL (ref 135–420)
PMV BLD AUTO: 10.9 FL (ref 9.2–11.8)
POTASSIUM SERPL-SCNC: 3.9 MMOL/L (ref 3.5–5.5)
RBC # BLD AUTO: 3.43 M/UL (ref 4.7–5.5)
SODIUM SERPL-SCNC: 142 MMOL/L (ref 136–145)
WBC # BLD AUTO: 4.8 K/UL (ref 4.6–13.2)

## 2017-07-01 PROCEDURE — 85025 COMPLETE CBC W/AUTO DIFF WBC: CPT | Performed by: INTERNAL MEDICINE

## 2017-07-01 PROCEDURE — 82962 GLUCOSE BLOOD TEST: CPT

## 2017-07-01 PROCEDURE — 97530 THERAPEUTIC ACTIVITIES: CPT

## 2017-07-01 PROCEDURE — 65270000029 HC RM PRIVATE

## 2017-07-01 PROCEDURE — 80048 BASIC METABOLIC PNL TOTAL CA: CPT | Performed by: INTERNAL MEDICINE

## 2017-07-01 PROCEDURE — 74011000258 HC RX REV CODE- 258: Performed by: INTERNAL MEDICINE

## 2017-07-01 PROCEDURE — 97161 PT EVAL LOW COMPLEX 20 MIN: CPT

## 2017-07-01 PROCEDURE — 74011250636 HC RX REV CODE- 250/636: Performed by: INTERNAL MEDICINE

## 2017-07-01 PROCEDURE — 74011250637 HC RX REV CODE- 250/637: Performed by: INTERNAL MEDICINE

## 2017-07-01 PROCEDURE — 74011000258 HC RX REV CODE- 258: Performed by: HOSPITALIST

## 2017-07-01 PROCEDURE — 97167 OT EVAL HIGH COMPLEX 60 MIN: CPT

## 2017-07-01 PROCEDURE — 97535 SELF CARE MNGMENT TRAINING: CPT

## 2017-07-01 PROCEDURE — 36415 COLL VENOUS BLD VENIPUNCTURE: CPT | Performed by: INTERNAL MEDICINE

## 2017-07-01 RX ORDER — PANTOPRAZOLE SODIUM 40 MG/1
40 GRANULE, DELAYED RELEASE ORAL 2 TIMES DAILY
Status: DISCONTINUED | OUTPATIENT
Start: 2017-07-01 | End: 2017-07-03 | Stop reason: HOSPADM

## 2017-07-01 RX ADMIN — HEPARIN SODIUM 5000 UNITS: 5000 INJECTION, SOLUTION INTRAVENOUS; SUBCUTANEOUS at 09:33

## 2017-07-01 RX ADMIN — NAPROXEN 500 MG: 250 TABLET ORAL at 16:19

## 2017-07-01 RX ADMIN — METHOCARBAMOL 500 MG: 500 TABLET ORAL at 14:00

## 2017-07-01 RX ADMIN — HEPARIN SODIUM 5000 UNITS: 5000 INJECTION, SOLUTION INTRAVENOUS; SUBCUTANEOUS at 16:19

## 2017-07-01 RX ADMIN — CEFTRIAXONE 1 G: 1 INJECTION, POWDER, FOR SOLUTION INTRAMUSCULAR; INTRAVENOUS at 21:15

## 2017-07-01 RX ADMIN — METHOCARBAMOL 500 MG: 500 TABLET ORAL at 17:24

## 2017-07-01 RX ADMIN — METHOCARBAMOL 500 MG: 500 TABLET ORAL at 21:16

## 2017-07-01 RX ADMIN — CITALOPRAM HYDROBROMIDE 20 MG: 20 TABLET ORAL at 09:34

## 2017-07-01 RX ADMIN — BISACODYL 10 MG: 10 SUPPOSITORY RECTAL at 09:35

## 2017-07-01 RX ADMIN — PANTOPRAZOLE SODIUM 40 MG: 40 TABLET, DELAYED RELEASE ORAL at 09:35

## 2017-07-01 RX ADMIN — NAPROXEN 500 MG: 250 TABLET ORAL at 09:33

## 2017-07-01 RX ADMIN — FLUTICASONE PROPIONATE 2 SPRAY: 50 SPRAY, METERED NASAL at 09:36

## 2017-07-01 RX ADMIN — RISPERIDONE 1 MG: 0.5 TABLET, FILM COATED ORAL at 09:34

## 2017-07-01 RX ADMIN — DEXTROSE MONOHYDRATE AND SODIUM CHLORIDE 50 ML/HR: 5; .45 INJECTION, SOLUTION INTRAVENOUS at 16:32

## 2017-07-01 RX ADMIN — DIVALPROEX SODIUM 250 MG: 125 CAPSULE, COATED PELLETS ORAL at 21:16

## 2017-07-01 RX ADMIN — METHOCARBAMOL 500 MG: 500 TABLET ORAL at 09:35

## 2017-07-01 RX ADMIN — VANCOMYCIN HYDROCHLORIDE 1250 MG: 10 INJECTION, POWDER, LYOPHILIZED, FOR SOLUTION INTRAVENOUS at 06:55

## 2017-07-01 RX ADMIN — DIVALPROEX SODIUM 250 MG: 125 CAPSULE, COATED PELLETS ORAL at 09:34

## 2017-07-01 NOTE — PROGRESS NOTES
Hospitalist Progress Note    Patient: Susu Irby MRN: [id number]  CSN: [id number]    YOB: 1955  Age: 62 y.o. Sex: male    DOA: 6/28/2017 LOS:  LOS: 3 days              Assessment/Plan     1. Left LE Cellulitis; improving  2. UTI  3. Lactic Acidosis; likely from infection. Resolved   4. Downs Syndrome   5. Alzheimer's dementia   6. GERD  7. HLD  8. Seizures  FULL CODE        -follow up  culture, cont Vanc and rocephin started. -neurochecks, bed rest   -follow up urine cultures   -LE Dopplers -neg for DVT  -IVF,pureed diet, aspiration precaution   -fall precaution   -supportive care      POA/Legal Guardian, Cindy Salgado (brother)    Dispo: Placement on Monday. Appreciate  help in setting it up. Patient Active Problem List   Diagnosis Code    Pneumonia J18.9    UTI (urinary tract infection) N39.0    Hypoxia R09.02    Upper abdominal pain R10.10    Mental retardation F79    Down's syndrome Q90.9    Alzheimer's dementia G30.9    HTN (hypertension) I10    HLD (hyperlipidemia) E78.5    GERD (gastroesophageal reflux disease) K21.9    Cellulitis L03.90    Lactic acid acidosis E87.2       Subjective:    No acute events overnight.  No complaints this morning     Review of systems:    Constitutional: denies fevers, chills, myalgias  Respiratory: denies SOB, cough  Cardiovascular: denies chest pain, palpitations  Gastrointestinal: denies nausea, vomiting, diarrhea      Vital signs/Intake and Output:  Visit Vitals    /70 (BP 1 Location: Left arm)    Pulse (!) 49    Temp 97.9 °F (36.6 °C)    Resp 16    Ht 5' 2\" (1.575 m)    Wt 80.6 kg (177 lb 11.1 oz)    SpO2 100%    BMI 32.5 kg/m2     Current Shift:  06/30 1901 - 07/01 0700  In: 775 [I.V.:775]  Out: -   Last three shifts:  06/29 0701 - 06/30 1900  In: 360 [P.O.:360]  Out: -     Exam:    General: alert and awake but unable to communicate due to his mental condition   Head/Neck: NCAT, supple, No masses, No lymphadenopathy  CVS:Regular rate and rhythm, no M/R/G, S1/S2 heard, no thrill  Lungs:Clear to auscultation bilaterally, no wheezes, rhonchi, or rales  Abdomen: Soft, Nontender, No distention, Normal Bowel sounds, No hepatomegaly  Extremities: No C/C/E, pulses palpable 2+  Skin:normal texture and turgor, no rashes, no lesions  Neuro:grossly normal , follows commands  Psych:appropriate  Improvement noted in his RLE/thigh erythema, regressed from previously demarcated spot. Labs: Results:       Chemistry Recent Labs      06/30/17   0530  06/29/17 0203 06/28/17   1829   GLU  91  90  167*   NA  143  141  137   K  3.7  4.0  3.9   CL  108  108  102   CO2  25  26  23   BUN  7  13  16   CREA  0.90  1.02  1.17   CA  7.9*  7.7*  8.1*   AGAP  10  7  12   BUCR  8*  13  14   AP   --   46  54   TP   --   6.3*  7.3   ALB   --   2.2*  2.7*   GLOB   --   4.1*  4.6*   AGRAT   --   0.5*  0.6*      CBC w/Diff Recent Labs      06/29/17 0203 06/28/17   1829   WBC  6.7  9.0   RBC  3.54*  3.94*   HGB  11.9*  13.5   HCT  35.6*  39.1   PLT  180  210   GRANS   --   74*   LYMPH   --   15*   EOS   --   1      Cardiac Enzymes No results for input(s): CPK, CKND1, ALDAIR in the last 72 hours. No lab exists for component: CKRMB, TROIP   Coagulation Recent Labs      06/29/17 0203   PTP  13.2   INR  1.0   APTT  33.3       Lipid Panel Lab Results   Component Value Date/Time    Cholesterol, total 140 05/12/2016 09:59 AM    HDL Cholesterol 46 05/12/2016 09:59 AM    LDL, calculated 75.8 05/12/2016 09:59 AM    VLDL, calculated 18.2 05/12/2016 09:59 AM    Triglyceride 91 05/12/2016 09:59 AM    CHOL/HDL Ratio 3.0 05/12/2016 09:59 AM      BNP No results for input(s): BNPP in the last 72 hours.    Liver Enzymes Recent Labs      06/29/17   0203   TP  6.3*   ALB  2.2*   AP  46   SGOT  19      Thyroid Studies No results found for: T4, T3U, TSH, TSHEXT     Procedures/imaging: see electronic medical records for all procedures/Xrays and details which were not copied into this note but were reviewed prior to creation of Thad Mello MD

## 2017-07-01 NOTE — PROGRESS NOTES
Shift summary: Pt alert, will verbalize but unable to assess orientation level. Pt incontinent of urine. Appears to be resting comfortably. Pt slept through most of the night.

## 2017-07-01 NOTE — PROGRESS NOTES
Problem: Mobility Impaired (Adult and Pediatric)  Goal: *Acute Goals and Plan of Care (Insert Text)  PHYSICAL THERAPY EVALUATION & DISCHARGE     Patient: Nikki Harrell (61 y.o. male)  Date: 7/1/2017  Primary Diagnosis: Cellulitis of thigh  Cellulitis of buttock  Cellulitis  UTI (urinary tract infection)        Precautions: Fall         ASSESSMENT AND RECOMMENDATIONS:  Based on the objective data described below, the patient presents with decreased ROM in B LE, contractures presents. Pt nonverbal throughout session. Pt does track visually. Pt does not follow commands and unable to participate in skilled therapy. Pt would benefit from PROM/stretching to bilateral LE to maintain current flexibility and prevent decline. Skilled physical therapy is not indicated at this time. Discharge Recommendations: To Be Determined  Further Equipment Recommendations for Discharge: N/A        SUBJECTIVE:   Patient stated-pt is non verbal      OBJECTIVE DATA SUMMARY:       Past Medical History:   Diagnosis Date    Alzheimer's dementia      Down's syndrome      Seizure ([facility] [address]     No past surgical history on file. Barriers to Learning/Limitations: yes;  cognitive  Compensate with: visual, verbal, tactile, kinesthetic cues/model  Prior Level of Function/Home Situation: Unknown  Home Situation  Home Environment: [facility] Name:  ([facility]  Living Alone: No  Support Systems: Family member(s)  Patient Expects to be Discharged to[de-identified] Group home  Current DME Used/Available at Home: Wheelchair (nitin lift)  Critical Behavior:  Neurologic State: Alert  Orientation Level: Unable to verbalize  Cognition: No command following  Psychosocial  Patient Behaviors: Calm;Flat affect  Purposeful Interaction: Yes  Pt Identified Daily Priority: Clinical issues (comment)  Caritas Process: Attend basic human needs;Nurture loving kindness  Caring Interventions: Reassure  Reassure:  Therapeutic listening  Therapeutic Modalities: Intentional therapeutic touch  Skin Condition/Temp: Dry;Warm  Skin Integrity: Intact  Skin Integumentary  Skin Color: Appropriate for ethnicity  Skin Condition/Temp: Dry;Warm  Skin Integrity: Intact  Turgor: Non-tenting  Hair Growth: Present  Varicosities: Absent  Tone & Sensation:   Tone: Abnormal  Range Of Motion:  PROM: Generally decreased, functional  Functional Mobility:  Bed Mobility:  Rolling: Total assistance  Activity Tolerance:   Poor  Please refer to the flowsheet for vital signs taken during this treatment. After treatment:   [ ]         Patient left in no apparent distress sitting up in chair  [X]         Patient left in no apparent distress in bed  [X]         Call bell left within reach  [X]         Nursing notified  [ ]         Caregiver present  [ ]         Bed alarm activated      COMMUNICATION/EDUCATION:   [X]         Fall prevention education was provided and the patient/caregiver indicated understanding. [X]         Patient/family have participated as able in goal setting and plan of care. [ ]         Patient/family agree to work toward stated goals and plan of care. [ ]         Patient understands intent and goals of therapy, but is neutral about his/her participation. [ ]         Patient is unable to participate in goal setting and plan of care.      Thank you for this referral.  [place] Jaswinder Hansen   Time Calculation: 25 mins     Eval Complexity: History: MEDIUM  Complexity : 1-2 comorbidities / personal factors will impact the outcome/ POC Exam:LOW Complexity : 1-2 Standardized tests and measures addressing body structure, function, activity limitation and / or participation in recreation  Presentation: LOW Complexity : Stable, uncomplicated  Clinical Decision Making:Low Complexity total A Overall Complexity:LOW

## 2017-07-01 NOTE — PROGRESS NOTES
Problem: Self Care Deficits Care Plan (Adult)  Goal: *Acute Goals and Plan of Care (Insert Text)  Occupational Therapy Goals  Initiated 7/1/2017 within 7 day(s). 1. Patient will complete washing of face with Moderate-Maximal assistance. 2. Patient will bring fork/spoon to mouth for self-feeding with Minimal-Moderate assistance. 3. Patient will grasp and hold cup to bring to mouth to take small sips with Minimal-moderate assistance. OCCUPATIONAL THERAPY EVALUATION     Patient: Rl Jose (76 y.o. male)  Date: 7/1/2017  Primary Diagnosis: Cellulitis of thigh  Cellulitis of buttock  Cellulitis  UTI (urinary tract infection)  Precautions:   Fall risk; aspiration risk  ASSESSMENT :  Based on the objective data described below, the patient presents with limited ROM of BUE, decrease strength, impaired FMC/GMC, and decrease command following that is impacting his performance to complete ADLs. Pt does have dementia and down-syndrome. Pt does live in a group home and will plan to return to same facility at discharge. Pt did not communicate or follow commands. Pt does have some contractures of cyndie hands, tone, and limited ROM of BUE. Pt required hand over hand to bring cup to mouth in order to take small sips. Pt did follow command of swallowing after each sip. Pt was unable to fully grasp onto cup secondary to limited ROM of digits. Pt's glasses were removed in attempt to encourage pt to wash his face with some assistance but did not follow command. Pt did have initiation of holding onto his glasses to bring to his face but did require max A to don. Pt will be placed on a 3-day trial to determine any progress can be made with self-feeding in order to maximize his level of independence. Patient will benefit from skilled intervention to address the above impairments.   Patients rehabilitation potential is considered to be Fair  Factors which may influence rehabilitation potential include:   [ ]             None noted  [X]             Mental ability/status  [X]             Medical condition  [ ]             Home/family situation and support systems  [ ]             Safety awareness  [ ]             Pain tolerance/management  [ ]             Other:        PLAN :  Recommendations and Planned Interventions:  [X]               Self Care Training                  [X]        Therapeutic Activities  [X]               Functional Mobility Training    [ ]        Cognitive Retraining  [ ]               Therapeutic Exercises           [ ]        Endurance Activities  [ ]               Balance Training                   [ ]        Neuromuscular Re-Education  [ ]               Visual/Perceptual Training     [X]   Home Safety Training  [X]               Patient Education                 [X]        Family Training/Education  [ ]               Other (comment):     Frequency/Duration: Patient will be followed by occupational therapy 3-5x/wk for 1 week for 3 day trial to address goals. Discharge Recommendations: back to group home  Further Equipment Recommendations for Discharge: TBD       SUBJECTIVE:   Patient stated . Pt did not communicate. OBJECTIVE DATA SUMMARY:       Past Medical History:   Diagnosis Date    Alzheimer's dementia      Down's syndrome      Seizure ([facility] [address]     No past surgical history on file. Barriers to Learning/Limitations: yes;  cognitive  Compensate with: visual, verbal, tactile, kinesthetic cues/model  Prior Level of Function/Home Situation: Pt was living in a group home where he was w/c bound.   Home Situation  [facility] Environment: [facility] Name:  ([facility]  Living Alone: No  Support Systems: Family member(s)  Patient Expects to be Discharged to[de-identified] Group home  Current DME Used/Available at Home: Wheelchair (nitin lift)  Cognitive/Behavioral Status:  Neurologic State: Alert  Orientation Level: Unable to verbalize  Cognition: No command following   Skin: BUE skin intact  Edema: none noted  Vision/Perceptual:    Corrective Lenses: Glasses  Coordination:  Coordination: Generally decreased, functional  Fine Motor Skills-Upper: Left Impaired;Right Impaired    Gross Motor Skills-Upper: Left Impaired;Right Impaired  Tone & Sensation:(B) UE  Tone: Abnormal (tone noted of Andrea UE)   Range of Motion:(B) UE   PROM: Generally decreased, functional (mild contracture noted andrea hands-in flexed position MCP jts)  ADL Assessment/Intervention:  Feeding: Total assistance (to drink small sips from cup)  Activity Tolerance:   Pt tolerated tx/eval well with no signs of fatigue or increase in pain after treatment. Please refer to the flowsheet for vital signs taken during this treatment. After treatment:   [ ] Patient left in no apparent distress sitting up in chair  [ ] Patient left sitting on EOB  [X] Patient left in no apparent distress in bed  [ ] Patient declined to be OOB at this time due to   [X] Call bell left within reach  [X] Nursing notified(Venkat)  [ ] Caregiver present  [ ] Bed alarm activated  [ ] CPM placed on  knee  COMMUNICATION/EDUCATION:   [ ] Home safety education was provided and the patient/caregiver indicated understanding. [ ] Patient/family have participated as able in goal setting and plan of care. [ ] Patient/family agree to work toward stated goals and plan of care. [ ] Patient understands intent and goals of therapy, but is neutral about his/her participation. [X] Patient is unable to participate in goal setting and plan of care.      Thank you for this referral.  Merced Merino, OT  Time Calculation: 24 mins

## 2017-07-01 NOTE — PROGRESS NOTES
0730-received report from [address] [place] [address] included SBAR, MAR and Kardex. 0845-assessed pt, resting quietly in bed, IV in progress, Incontinent. Rt thigh reddened and slightly swollen. Incontinent of urine. 1200-pt has weak cough, sitting up for meals and being fed dysphagia diet with honey thickened liquids. 1400-no change in pt status. 1800-shift summary no changes  Bedside and Verbal shift change report given to LUKE San RN (oncoming nurse) by Misty Lane RN (offgoing nurse). Report included the following information SBAR, Kardex and MAR.

## 2017-07-01 NOTE — ROUTINE PROCESS
Bedside and Verbal shift change report given to BRITTANY Saenz RN (oncoming nurse) by RASTA Lr RN (offgoing nurse). Report included the following information SBAR, Kardex, Intake/Output and MAR.

## 2017-07-02 LAB
ANION GAP BLD CALC-SCNC: 5 MMOL/L (ref 3–18)
BASOPHILS # BLD AUTO: 0.1 K/UL (ref 0–0.06)
BASOPHILS # BLD: 1 % (ref 0–2)
BUN SERPL-MCNC: 8 MG/DL (ref 7–18)
BUN/CREAT SERPL: 8 (ref 12–20)
CALCIUM SERPL-MCNC: 7.9 MG/DL (ref 8.5–10.1)
CHLORIDE SERPL-SCNC: 106 MMOL/L (ref 100–108)
CO2 SERPL-SCNC: 30 MMOL/L (ref 21–32)
CREAT SERPL-MCNC: 0.97 MG/DL (ref 0.6–1.3)
DATE LAST DOSE: NORMAL
DIFFERENTIAL METHOD BLD: ABNORMAL
EOSINOPHIL # BLD: 0.2 K/UL (ref 0–0.4)
EOSINOPHIL NFR BLD: 4 % (ref 0–5)
ERYTHROCYTE [DISTWIDTH] IN BLOOD BY AUTOMATED COUNT: 13 % (ref 11.6–14.5)
GLUCOSE BLD STRIP.AUTO-MCNC: 88 MG/DL (ref 70–110)
GLUCOSE BLD STRIP.AUTO-MCNC: 91 MG/DL (ref 70–110)
GLUCOSE SERPL-MCNC: 114 MG/DL (ref 74–99)
HCT VFR BLD AUTO: 34.9 % (ref 36–48)
HGB BLD-MCNC: 11.7 G/DL (ref 13–16)
LYMPHOCYTES # BLD AUTO: 48 % (ref 21–52)
LYMPHOCYTES # BLD: 2.5 K/UL (ref 0.9–3.6)
MCH RBC QN AUTO: 33.5 PG (ref 24–34)
MCHC RBC AUTO-ENTMCNC: 33.5 G/DL (ref 31–37)
MCV RBC AUTO: 100 FL (ref 74–97)
MONOCYTES # BLD: 0.5 K/UL (ref 0.05–1.2)
MONOCYTES NFR BLD AUTO: 10 % (ref 3–10)
NEUTS SEG # BLD: 1.9 K/UL (ref 1.8–8)
NEUTS SEG NFR BLD AUTO: 37 % (ref 40–73)
PLATELET # BLD AUTO: 204 K/UL (ref 135–420)
PMV BLD AUTO: 10.5 FL (ref 9.2–11.8)
POTASSIUM SERPL-SCNC: 3.5 MMOL/L (ref 3.5–5.5)
RBC # BLD AUTO: 3.49 M/UL (ref 4.7–5.5)
REPORTED DOSE,DOSE: NORMAL UNITS
REPORTED DOSE/TIME,TMG: 655
SODIUM SERPL-SCNC: 141 MMOL/L (ref 136–145)
VANCOMYCIN TROUGH SERPL-MCNC: 12.5 UG/ML (ref 10–20)
WBC # BLD AUTO: 5.2 K/UL (ref 4.6–13.2)

## 2017-07-02 PROCEDURE — 74011250636 HC RX REV CODE- 250/636: Performed by: INTERNAL MEDICINE

## 2017-07-02 PROCEDURE — 74011000258 HC RX REV CODE- 258: Performed by: INTERNAL MEDICINE

## 2017-07-02 PROCEDURE — 74011250637 HC RX REV CODE- 250/637: Performed by: INTERNAL MEDICINE

## 2017-07-02 PROCEDURE — 80048 BASIC METABOLIC PNL TOTAL CA: CPT | Performed by: INTERNAL MEDICINE

## 2017-07-02 PROCEDURE — 36415 COLL VENOUS BLD VENIPUNCTURE: CPT | Performed by: INTERNAL MEDICINE

## 2017-07-02 PROCEDURE — 65270000029 HC RM PRIVATE

## 2017-07-02 PROCEDURE — 85025 COMPLETE CBC W/AUTO DIFF WBC: CPT | Performed by: INTERNAL MEDICINE

## 2017-07-02 PROCEDURE — 80202 ASSAY OF VANCOMYCIN: CPT | Performed by: INTERNAL MEDICINE

## 2017-07-02 PROCEDURE — 82962 GLUCOSE BLOOD TEST: CPT

## 2017-07-02 RX ADMIN — NAPROXEN 500 MG: 250 TABLET ORAL at 17:29

## 2017-07-02 RX ADMIN — DIVALPROEX SODIUM 250 MG: 125 CAPSULE, COATED PELLETS ORAL at 22:19

## 2017-07-02 RX ADMIN — METHOCARBAMOL 500 MG: 500 TABLET ORAL at 10:06

## 2017-07-02 RX ADMIN — BISACODYL 10 MG: 10 SUPPOSITORY RECTAL at 10:05

## 2017-07-02 RX ADMIN — METHOCARBAMOL 500 MG: 500 TABLET ORAL at 17:30

## 2017-07-02 RX ADMIN — PANTOPRAZOLE SODIUM 40 MG: 40 GRANULE, DELAYED RELEASE ORAL at 22:19

## 2017-07-02 RX ADMIN — DIVALPROEX SODIUM 250 MG: 125 CAPSULE, COATED PELLETS ORAL at 10:06

## 2017-07-02 RX ADMIN — VANCOMYCIN HYDROCHLORIDE 1250 MG: 10 INJECTION, POWDER, LYOPHILIZED, FOR SOLUTION INTRAVENOUS at 01:46

## 2017-07-02 RX ADMIN — HEPARIN SODIUM 5000 UNITS: 5000 INJECTION, SOLUTION INTRAVENOUS; SUBCUTANEOUS at 10:05

## 2017-07-02 RX ADMIN — HEPARIN SODIUM 5000 UNITS: 5000 INJECTION, SOLUTION INTRAVENOUS; SUBCUTANEOUS at 22:20

## 2017-07-02 RX ADMIN — PANTOPRAZOLE SODIUM 40 MG: 40 GRANULE, DELAYED RELEASE ORAL at 00:24

## 2017-07-02 RX ADMIN — FLUTICASONE PROPIONATE 2 SPRAY: 50 SPRAY, METERED NASAL at 10:07

## 2017-07-02 RX ADMIN — NAPROXEN 500 MG: 250 TABLET ORAL at 10:05

## 2017-07-02 RX ADMIN — RISPERIDONE 1 MG: 0.5 TABLET, FILM COATED ORAL at 10:06

## 2017-07-02 RX ADMIN — METHOCARBAMOL 500 MG: 500 TABLET ORAL at 14:05

## 2017-07-02 RX ADMIN — HEPARIN SODIUM 5000 UNITS: 5000 INJECTION, SOLUTION INTRAVENOUS; SUBCUTANEOUS at 17:30

## 2017-07-02 RX ADMIN — HEPARIN SODIUM 5000 UNITS: 5000 INJECTION, SOLUTION INTRAVENOUS; SUBCUTANEOUS at 00:24

## 2017-07-02 RX ADMIN — PANTOPRAZOLE SODIUM 40 MG: 40 GRANULE, DELAYED RELEASE ORAL at 10:06

## 2017-07-02 RX ADMIN — CEFTRIAXONE 1 G: 1 INJECTION, POWDER, FOR SOLUTION INTRAMUSCULAR; INTRAVENOUS at 22:19

## 2017-07-02 RX ADMIN — VANCOMYCIN HYDROCHLORIDE 1250 MG: 10 INJECTION, POWDER, LYOPHILIZED, FOR SOLUTION INTRAVENOUS at 17:35

## 2017-07-02 RX ADMIN — METHOCARBAMOL 500 MG: 500 TABLET ORAL at 22:19

## 2017-07-02 RX ADMIN — CITALOPRAM HYDROBROMIDE 20 MG: 20 TABLET ORAL at 10:06

## 2017-07-02 NOTE — ROUTINE PROCESS
Bedside shift change report given to Keena Wall RN (oncoming nurse) by Estelita Stallings RN (offgoing nurse). Report included the following information SBAR, Kardex and MAR.

## 2017-07-02 NOTE — PROGRESS NOTES
S: Vancomycin trough = 12.5 @ 0025 07/02/2017   O: Ht: 62\" Wt: 81.2 kg  Labs: BUN:8 SrCr: 0.97 CrCl: 75.8 ml/min  WBC : 5.2         Current A/B Ceftriaxone 1 gm IV q 24hr   A:  Trough 10-15   P: Patient received the doses befitting to the q 18 hr schedule  Pharmacy shall continue to monitor and adjust based on clinical    status      and outcomes

## 2017-07-02 NOTE — PROGRESS NOTES
Hospitalist Progress Note    Patient: Margaret Foy MRN: [id number]  CSN: [id number]    YOB: 1955  Age: 62 y.o. Sex: male    DOA: 6/28/2017 LOS:  LOS: 4 days                Assessment/Plan     Patient Active Problem List   Diagnosis Code    Pneumonia J18.9    UTI (urinary tract infection) N39.0    Hypoxia R09.02    Upper abdominal pain R10.10    Mental retardation F79    Down's syndrome Q90.9    Alzheimer's dementia G30.9    HTN (hypertension) I10    HLD (hyperlipidemia) E78.5    GERD (gastroesophageal reflux disease) K21.9    Cellulitis L03.90    Lactic acid acidosis E87.2            Cellulitis - on vanc and rocephin    UTI - urine cultures with mixed dilma. Antibiotics as above. Downs syndrome. History of seizures, on depakote    Not responding to questions. Physical Exam:  General: Awake,    HEENT: NC, Atraumatic. PERRLA, anicteric sclerae. Lungs: CTA Bilaterally. No Wheezing/Rhonchi/Rales. Heart:  Regular  rhythm,  No murmur, No Rubs, No Gallops  Abdomen: Soft, Non distended, Non tender.  +Bowel sounds,   Extremities: Erythema LE  Psych:   Not anxious or agitated.              Vital signs/Intake and Output:  Visit Vitals    /58 (BP 1 Location: Left arm, BP Patient Position: At rest)    Pulse (!) 56    Temp 97.5 °F (36.4 °C)    Resp 18    Ht 5' 2\" (1.575 m)    Wt 83.6 kg (184 lb 4.9 oz)    SpO2 99%    BMI 33.71 kg/m2     Current Shift:  07/02 0701 - 07/02 1900  In: 480 [P.O.:480]  Out: -   Last three shifts:  06/30 1901 - 07/02 0700  In: 1749.2 [I.V.:1749.2]  Out: -             Labs: Results:       Chemistry Recent Labs      07/02/17   0028  07/01/17   0751  06/30/17   0530   GLU  114*  80  91   NA  141  142  143   K  3.5  3.9  3.7   CL  106  106  108   CO2  30  28  25   BUN  8  5*  7   CREA  0.97  0.99  0.90   CA  7.9*  7.8*  7.9*   AGAP  5  8  10   BUCR  8*  5*  8*      CBC w/Diff Recent Labs      07/02/17   0028  07/01/17   0751   WBC  5.2  4.8   RBC 3.49*  3.43*   HGB  11.7*  11.5*   HCT  34.9*  34.1*   PLT  204  211   GRANS  37*  43   LYMPH  48  40   EOS  4  4      Cardiac Enzymes No results for input(s): CPK, CKND1, ALDAIR in the last 72 hours. No lab exists for component: CKRMB, TROIP   Coagulation No results for input(s): PTP, INR, APTT in the last 72 hours. No lab exists for component: INREXT    Lipid Panel Lab Results   Component Value Date/Time    Cholesterol, total 140 05/12/2016 09:59 AM    HDL Cholesterol 46 05/12/2016 09:59 AM    LDL, calculated 75.8 05/12/2016 09:59 AM    VLDL, calculated 18.2 05/12/2016 09:59 AM    Triglyceride 91 05/12/2016 09:59 AM    CHOL/HDL Ratio 3.0 05/12/2016 09:59 AM      BNP No results for input(s): BNPP in the last 72 hours. Liver Enzymes No results for input(s): TP, ALB, TBIL, AP, SGOT, GPT in the last 72 hours.     No lab exists for component: DBIL   Thyroid Studies No results found for: T4, T3U, TSH, TSHEXT     Procedures/imaging: see electronic medical records for all procedures/Xrays and details which were not copied into this note but were reviewed prior to creation of Plan

## 2017-07-02 NOTE — PROGRESS NOTES
0710-received report from [employer] included SBAR, MAR and Kardex. 0815-assesssed pt, sleeping in bed, IV in progress, no distress noted. 1135-notified Dr Tania Hassan regarding pt MEWS score and BP. No new orders presently. 1400-no change in status  1745-coughing after meals may be aspirating. Sitting up while being fed in high fowlers position close to 90 deg. 1800-shift summary, notified Dr Shirlene Morton about pt coughing after meals and possible aspiration. Bedside and Verbal shift change report given to LUKE San RN (oncoming nurse) by Adelaide lEy RN (offgoing nurse). Report included the following information SBAR, Kardex and MAR.

## 2017-07-03 VITALS
WEIGHT: 184.3 LBS | HEART RATE: 57 BPM | HEIGHT: 62 IN | OXYGEN SATURATION: 99 % | SYSTOLIC BLOOD PRESSURE: 133 MMHG | BODY MASS INDEX: 33.92 KG/M2 | RESPIRATION RATE: 16 BRPM | TEMPERATURE: 99 F | DIASTOLIC BLOOD PRESSURE: 67 MMHG

## 2017-07-03 LAB
ANION GAP BLD CALC-SCNC: 8 MMOL/L (ref 3–18)
BASOPHILS # BLD AUTO: 0.1 K/UL (ref 0–0.06)
BASOPHILS # BLD: 2 % (ref 0–2)
BUN SERPL-MCNC: 9 MG/DL (ref 7–18)
BUN/CREAT SERPL: 9 (ref 12–20)
CALCIUM SERPL-MCNC: 7.8 MG/DL (ref 8.5–10.1)
CHLORIDE SERPL-SCNC: 106 MMOL/L (ref 100–108)
CO2 SERPL-SCNC: 31 MMOL/L (ref 21–32)
CREAT SERPL-MCNC: 0.99 MG/DL (ref 0.6–1.3)
DIFFERENTIAL METHOD BLD: ABNORMAL
EOSINOPHIL # BLD: 0.3 K/UL (ref 0–0.4)
EOSINOPHIL NFR BLD: 5 % (ref 0–5)
ERYTHROCYTE [DISTWIDTH] IN BLOOD BY AUTOMATED COUNT: 13.1 % (ref 11.6–14.5)
GLUCOSE BLD STRIP.AUTO-MCNC: 103 MG/DL (ref 70–110)
GLUCOSE BLD STRIP.AUTO-MCNC: 84 MG/DL (ref 70–110)
GLUCOSE BLD STRIP.AUTO-MCNC: 87 MG/DL (ref 70–110)
GLUCOSE SERPL-MCNC: 90 MG/DL (ref 74–99)
HCT VFR BLD AUTO: 35 % (ref 36–48)
HGB BLD-MCNC: 11.4 G/DL (ref 13–16)
LYMPHOCYTES # BLD AUTO: 49 % (ref 21–52)
LYMPHOCYTES # BLD: 2.4 K/UL (ref 0.9–3.6)
MCH RBC QN AUTO: 32.9 PG (ref 24–34)
MCHC RBC AUTO-ENTMCNC: 32.6 G/DL (ref 31–37)
MCV RBC AUTO: 101.2 FL (ref 74–97)
MONOCYTES # BLD: 0.5 K/UL (ref 0.05–1.2)
MONOCYTES NFR BLD AUTO: 11 % (ref 3–10)
NEUTS SEG # BLD: 1.6 K/UL (ref 1.8–8)
NEUTS SEG NFR BLD AUTO: 33 % (ref 40–73)
PLATELET # BLD AUTO: 222 K/UL (ref 135–420)
PMV BLD AUTO: 10.7 FL (ref 9.2–11.8)
POTASSIUM SERPL-SCNC: 3.8 MMOL/L (ref 3.5–5.5)
RBC # BLD AUTO: 3.46 M/UL (ref 4.7–5.5)
SODIUM SERPL-SCNC: 145 MMOL/L (ref 136–145)
WBC # BLD AUTO: 4.9 K/UL (ref 4.6–13.2)

## 2017-07-03 PROCEDURE — 82962 GLUCOSE BLOOD TEST: CPT

## 2017-07-03 PROCEDURE — 74011250636 HC RX REV CODE- 250/636: Performed by: INTERNAL MEDICINE

## 2017-07-03 PROCEDURE — 85025 COMPLETE CBC W/AUTO DIFF WBC: CPT | Performed by: INTERNAL MEDICINE

## 2017-07-03 PROCEDURE — 74011250637 HC RX REV CODE- 250/637: Performed by: INTERNAL MEDICINE

## 2017-07-03 PROCEDURE — 80048 BASIC METABOLIC PNL TOTAL CA: CPT | Performed by: INTERNAL MEDICINE

## 2017-07-03 PROCEDURE — 36415 COLL VENOUS BLD VENIPUNCTURE: CPT | Performed by: INTERNAL MEDICINE

## 2017-07-03 RX ORDER — TAMSULOSIN HYDROCHLORIDE 0.4 MG/1
0.4 CAPSULE ORAL DAILY
Status: DISCONTINUED | OUTPATIENT
Start: 2017-07-03 | End: 2017-07-03

## 2017-07-03 RX ORDER — SULFAMETHOXAZOLE AND TRIMETHOPRIM 400; 80 MG/1; MG/1
1 TABLET ORAL 2 TIMES DAILY
Qty: 6 TAB | Refills: 0 | Status: SHIPPED | OUTPATIENT
Start: 2017-07-03 | End: 2017-07-06

## 2017-07-03 RX ORDER — LORAZEPAM 1 MG/1
1 TABLET ORAL
Qty: 2 TAB | Refills: 0 | Status: SHIPPED | OUTPATIENT
Start: 2017-07-03

## 2017-07-03 RX ORDER — MAGNESIUM CITRATE
296 SOLUTION, ORAL ORAL
Status: DISCONTINUED | OUTPATIENT
Start: 2017-07-03 | End: 2017-07-03

## 2017-07-03 RX ADMIN — METHOCARBAMOL 500 MG: 500 TABLET ORAL at 13:25

## 2017-07-03 RX ADMIN — FLUTICASONE PROPIONATE 2 SPRAY: 50 SPRAY, METERED NASAL at 14:31

## 2017-07-03 RX ADMIN — PANTOPRAZOLE SODIUM 40 MG: 40 GRANULE, DELAYED RELEASE ORAL at 13:26

## 2017-07-03 RX ADMIN — BISACODYL 10 MG: 10 SUPPOSITORY RECTAL at 13:25

## 2017-07-03 RX ADMIN — CITALOPRAM HYDROBROMIDE 20 MG: 20 TABLET ORAL at 13:25

## 2017-07-03 RX ADMIN — RISPERIDONE 1 MG: 0.5 TABLET, FILM COATED ORAL at 13:25

## 2017-07-03 RX ADMIN — DIVALPROEX SODIUM 250 MG: 125 CAPSULE, COATED PELLETS ORAL at 13:25

## 2017-07-03 RX ADMIN — HEPARIN SODIUM 5000 UNITS: 5000 INJECTION, SOLUTION INTRAVENOUS; SUBCUTANEOUS at 06:23

## 2017-07-03 RX ADMIN — VANCOMYCIN HYDROCHLORIDE 1250 MG: 10 INJECTION, POWDER, LYOPHILIZED, FOR SOLUTION INTRAVENOUS at 14:30

## 2017-07-03 NOTE — PROGRESS NOTES
Shift Summary:  Uneventful shift. Patient without sx of pain or discomfort. Patient repositioned, changed wet diaper, and linen. Q 2 turning maintained. Patient slept through the night, easily arouses. Responds to name only. Mostly nonverbal, but will speak few words.

## 2017-07-03 NOTE — ROUTINE PROCESS
Bedside and Verbal shift change report given to [address]  (oncoming nurse) by Elli Machado (offgoing nurse). Report included the following information SBAR, Kardex, Intake/Output and MAR.

## 2017-07-03 NOTE — DISCHARGE SUMMARY
Discharge Summary    Patient: Herbie Young MRN: [id number]  CSN: [id number]    YOB: 1955  Age: 62 y.o. Sex: male    DOA: 6/28/2017 LOS:  LOS: 5 days   Discharge Date:      Primary Care Provider:  Petar Tamayo MD    Admission Diagnoses: Cellulitis of thigh  Cellulitis of buttock  Cellulitis  UTI (urinary tract infection)    Discharge Diagnoses:    Patient Active Problem List   Diagnosis Code    Pneumonia J18.9    UTI (urinary tract infection) N39.0    Hypoxia R09.02    Upper abdominal pain R10.10    Mental retardation F79    Down's syndrome Q90.9    Alzheimer's dementia G30.9    HTN (hypertension) I10    HLD (hyperlipidemia) E78.5    GERD (gastroesophageal reflux disease) K21.9    Cellulitis L03.90    Lactic acid acidosis E87.2       Discharge Condition: Stable    Discharge Medications:     Current Discharge Medication List      START taking these medications    Details   trimethoprim-sulfamethoxazole (BACTRIM)  mg per tablet Take 1 Tab by mouth two (2) times a day for 3 days. Qty: 6 Tab, Refills: 0         CONTINUE these medications which have NOT CHANGED    Details   divalproex (DEPAKOTE SPRINKLE) 125 mg capsule Take 2 Caps by mouth two (2) times a day. Qty: 60 Cap, Refills: 0      pantoprazole (PROTONIX) 40 mg tablet Take 1 Tab by mouth two (2) times a day. Qty: 30 Tab, Refills: 0      citalopram (CELEXA) 20 mg tablet Take  by mouth daily. omega-3 fatty acids-vitamin e (FISH OIL) 1,000 mg cap Take 1 Cap by mouth. fluticasone (FLONASE) 50 mcg/actuation nasal spray 2 Sprays by Both Nostrils route daily. nutritional supplements (BROOKS) pack Take  by mouth. LORazepam (ATIVAN) 1 mg tablet Take 1 mg by mouth every four (4) hours as needed for Anxiety. !! risperiDONE (RISPERDAL) 1 mg tablet Take  by mouth daily. acetaminophen (TYLENOL) 650 mg CR tablet Take 650 mg by mouth every six (6) hours as needed for Pain.       bisacodyl (BISCOLAX) 10 mg suppository Insert 10 mg into rectum daily. loperamide (IMMODIUM) 2 mg tablet Take 2 mg by mouth four (4) times daily as needed for Diarrhea. methocarbamol (ROBAXIN) 500 mg tablet Take 500 mg by mouth four (4) times daily. MAG HYDROX/AL HYDROX/SIMETH (MINTOX PO) Take 15 mL by mouth daily as needed. naproxen (NAPROSYN) 500 mg tablet Take 500 mg by mouth two (2) times daily (with meals). !! risperiDONE (RISPERDAL) 0.5 mg tablet Take 0.5 mg by mouth as needed. PSEUDOEPHEDRINE-dextromethorphan-guaiFENesin (ROBAFEN CF) 30- mg/5 mL solution Take 5 mL by mouth every four (4) hours as needed for Cough. !! - Potential duplicate medications found. Please discuss with provider. Procedures : none     Consults: None      PHYSICAL EXAM  Visit Vitals    /65 (BP 1 Location: Left arm, BP Patient Position: At rest)    Pulse (!) 50    Temp 98.4 °F (36.9 °C)    Resp 16    Ht 5' 2\" (1.575 m)    Wt 83.6 kg (184 lb 4.9 oz)    SpO2 95%    BMI 33.71 kg/m2     General: Awake, no acute distress    HEENT: NC, Atraumatic. PERRLA, EOMI. Anicteric sclerae. Lungs:  CTA Bilaterally. No Wheezing/Rhonchi/Rales. Heart:  Regular  rhythm,  No murmur, No Rubs, No Gallops  Abdomen: Soft, Non distended, Non tender. +Bowel sounds,   Extremities: No c/c/e. Psych:   Not anxious or agitated. Neurologic:  No acute neurological deficits. Admission HPI :   Ramy Emanuel is a 62 y.o. With medical hx as listed comes to the hospital for evaluation of LLE redness. Hx per caregiver due to patients mental status.    Hx is very limited as the substitute caregiver that's present at bedside received a signout from the off going caregiver around 6pm. ALso I tried to reach his brother, Patrick Atwoody, but he did not asnwer his phone therefore left voicemail.      Caregiver noticed patient started developing left sided thigh redness about 2 days ago and it had progressively worsened spreading more proximally therefore brought him to the ED. He was also noted to have some chills at home but no other complaints. His appetite remains the same, he wears diapers. He remains bedbound at home. Take his meds as given and eats pureed diet.      When I saw the patient he was asleep and did not wake up to verbal stimuli. Per caregive its usually difficult to wake him up once asleep. No other complaints. Hospital Course :   Since he was admitted, iv abx -vanc and  Rocephin were administrated for cellulitis and possible UTI. Urine cx: MIXED GRAM POSITIVE RHONDA. He also received iv hydration for lactic acidosis. With the treatment, the  cellulitis is resolving. pvl is negative for dvt/     We also continued home medication for Alzheimer's and depakote for seizure. He also received speech evaluation and aspiration precaution. Activity: Activity as tolerated    Diet: Dysphagia diet-pureed    Follow-[facility]: [facility]     Disposition: [facility]     Minutes spent on discharge: 65 min       Labs: Results:       Chemistry Recent Labs      07/03/17 0309 07/02/17 0028  07/01/17   0751   GLU  90  114*  80   NA  145  141  142   K  3.8  3.5  3.9   CL  106  106  106   CO2  31  30  28   BUN  9  8  5*   CREA  0.99  0.97  0.99   CA  7.8*  7.9*  7.8*   AGAP  8  5  8   BUCR  9*  8*  5*      CBC w/Diff Recent Labs      07/03/17 0309 07/02/17 0028 07/01/17   0751   WBC  4.9  5.2  4.8   RBC  3.46*  3.49*  3.43*   HGB  11.4*  11.7*  11.5*   HCT  35.0*  34.9*  34.1*   PLT  222  204  211   GRANS  33*  37*  43   LYMPH  49  48  40   EOS  5  4  4      Cardiac Enzymes No results for input(s): CPK, CKND1, ALDAIR in the last 72 hours. No lab exists for component: CKRMB, TROIP   Coagulation No results for input(s): PTP, INR, APTT in the last 72 hours.     No lab exists for component: INREXT    Lipid Panel Lab Results   Component Value Date/Time    Cholesterol, total 140 05/12/2016 09:59 AM    HDL Cholesterol 46 05/12/2016 09:59 AM    LDL, calculated 75.8 05/12/2016 09:59 AM    VLDL, calculated 18.2 05/12/2016 09:59 AM    Triglyceride 91 05/12/2016 09:59 AM    CHOL/HDL Ratio 3.0 05/12/2016 09:59 AM      BNP No results for input(s): BNPP in the last 72 hours. Liver Enzymes No results for input(s): TP, ALB, TBIL, AP, SGOT, GPT in the last 72 hours. No lab exists for component: DBIL   Thyroid Studies No results found for: T4, T3U, TSH, TSHEXT         Significant Diagnostic Studies: Ct Abd Pelv W Cont    Result Date: 6/28/2017  EXAM: CT of the abdomen and pelvis INDICATION: Fever, cellulitis, abdominal pain COMPARISON: None. TECHNIQUE: Axial CT imaging of the abdomen and pelvis was performed with intravenous contrast. Multiplanar reformats were generated. DOSE REDUCTION:  One or more dose reduction techniques were used on this CT: automated exposure control, adjustment of the mAs and/or kVp according to patient's size, and iterative reconstruction techniques. The specific techniques utilized on this CT exam have been documented in the patient's electronic medical record. _______________ FINDINGS: LOWER CHEST: Unremarkable. LIVER, BILIARY: Liver is normal. No biliary dilation. Multiple small gallstones are seen in the gallbladder. PANCREAS: Normal. SPLEEN: Normal. ADRENALS: Normal. KIDNEYS/URETERS: There is a 5 mm cortical cyst the midpole the right kidney. The kidneys are unremarkable otherwise VASCULATURE: Unremarkable LYMPH NODES: No pathologic sized lymph nodes seen. There are subcentimeter para-aortic lymph nodes on the left measuring up to 8 mm in short axis. GASTRO INTESTINAL TRACT: Appendix is normal. There is no bowel obstruction. Moderate amount of stool is present in the colon. PELVIC ORGANS/BLADDER: There is significant urinary bladder wall thickening suggesting cystitis. Evaluation the bladder is limited by beam hardening artifacts from the right hip arthroplasty. No free fluid seen.  BONES: No acute or aggressive osseous abnormalities identified. There is chondrocalcinosis suggesting CPPD. There is diffuse osteopenia. OTHER: There is stranding of the subcutaneous tissues of the left buttock medially suggesting cellulitis. No focal abscess seen. _______________     IMPRESSION: 1. There is urinary bladder wall thickening suggesting cystitis. Cellulitis of the left buttock medially. No abscess seen. No cholelithiasis     Xr Chest Port    Result Date: 6/29/2017  EXAM:Chest X-Ray  History: Chest pain, shortness of breath Technique:  Portable Frontal View Comparison: Chest x-ray from 11/29/2016 at 08/25/2016 _______________ FINDINGS: Persistent hypoinflation degraded examination. The trachea is midline. The cardiomediastinal silhouette is midline and, stable . No focal consolidation, effusion, or pneumothorax. Intact osseous structures. _______________     IMPRESSION: 1. Persistent Pulmonary hypoinflation. Otherwise, stable exam with no acute cardiopulmonary process.              [facility]     CC: Brittany Knutson MD

## 2017-07-03 NOTE — PROGRESS NOTES
D/C Plan: [facility] ([facility], [address] [place] [address]; [phone])  07/03/2017      [facility] and spoke with Andrews Latif at this facility. He has indicated pt is wheel chair bound. Notified pt is medically stable to discharge today (7/3/17).   Notified Andrews Latif with [facility] and they will  this pt and transition him back to this facility today  at approximately 2:30pm. CM to continue to follow and assist with plan of care needs.

## 2017-07-03 NOTE — ROUTINE PROCESS
Bedside shift change report given to Pricila Stahl (oncoming nurse) by Pradeep Taylor RN (offgoing nurse). Report included the following information SBAR, Kardex and MAR.

## 2017-07-03 NOTE — DISCHARGE INSTRUCTIONS
Cellulitis: Care Instructions  Your Care Instructions    Cellulitis is a skin infection. It often occurs after a break in the skin from a scrape, cut, bite, or puncture, or after a rash. The doctor has checked you carefully, but problems can develop later. If you notice any problems or new symptoms, get medical treatment right away. Follow-up care is a key part of your treatment and safety. Be sure to make and go to all appointments, and call your doctor if you are having problems. It's also a good idea to know your test results and keep a list of the medicines you take. How can you care for yourself at home? · Take your antibiotics as directed. Do not stop taking them just because you feel better. You need to take the full course of antibiotics. · Prop up the infected area on pillows to reduce pain and swelling. Try to keep the area above the level of your heart as often as you can. · If your doctor told you how to care for your wound, follow your doctor's instructions. If you did not get instructions, follow this general advice:  ¨ Wash the wound with clean water 2 times a day. Don't use hydrogen peroxide or alcohol, which can slow healing. ¨ You may cover the wound with a thin layer of petroleum jelly, such as Vaseline, and a nonstick bandage. ¨ Apply more petroleum jelly and replace the bandage as needed. · Be safe with medicines. Take pain medicines exactly as directed. ¨ If the doctor gave you a prescription medicine for pain, take it as prescribed. ¨ If you are not taking a prescription pain medicine, ask your doctor if you can take an over-the-counter medicine. To prevent cellulitis in the future  · Try to prevent cuts, scrapes, or other injuries to your skin. Cellulitis most often occurs where there is a break in the skin. · If you get a scrape, cut, mild burn, or bite, wash the wound with clean water as soon as you can to help avoid infection.  Don't use hydrogen peroxide or alcohol, which can slow healing. · If you have swelling in your legs (edema), support stockings and good skin care may help prevent leg sores and cellulitis. · Take care of your feet, especially if you have diabetes or other conditions that increase the risk of infection. Wear shoes and socks. Do not go barefoot. If you have athlete's foot or other skin problems on your feet, talk to your doctor about how to treat them. When should you call for help? Call your doctor now or seek immediate medical care if:  · You have signs that your infection is getting worse, such as:  ¨ Increased pain, swelling, warmth, or redness. ¨ Red streaks leading from the area. ¨ Pus draining from the area. ¨ A fever. · You get a rash. Watch closely for changes in your health, and be sure to contact your doctor if:  · You are not getting better after 1 day (24 hours). · You do not get better as expected. Where can you learn more? Urinary Tract Infections in Men: Care Instructions  Your Care Instructions    A urinary tract infection, or UTI, is a general term for an infection anywhere between the kidneys and the tip of the penis. UTIs can also be a result of a prostate problem. Most cause pain or burning when you urinate. Most UTIs are caused by bacteria and can be cured with antibiotics. It is important to complete your treatment so that the infection does not get worse. Follow-up care is a key part of your treatment and safety. Be sure to make and go to all appointments, and call your doctor if you are having problems. It's also a good idea to know your test results and keep a list of the medicines you take. How can you care for yourself at home? · Take your antibiotics as prescribed. Do not stop taking them just because you feel better. You need to take the full course of antibiotics. · Take your medicines exactly as prescribed.  Your doctor may have prescribed a medicine, such as phenazopyridine (Pyridium), to help relieve pain when you urinate. This turns your urine orange. You may stop taking it when your symptoms get better. But be sure to take all of your antibiotics, which treat the infection. · Drink extra water for the next day or two. This will help make the urine less concentrated and help wash out the bacteria causing the infection. (If you have kidney, heart, or liver disease and have to limit your fluids, talk with your doctor before you increase your fluid intake.)  · Avoid drinks that are carbonated or have caffeine. They can irritate the bladder. · Urinate often. Try to empty your bladder each time. · To relieve pain, take a hot bath or lay a heating pad (set on low) over your lower belly or genital area. Never go to sleep with a heating pad in place. To help prevent UTIs  · Drink plenty of fluids, enough so that your urine is light yellow or clear like water. If you have kidney, heart, or liver disease and have to limit fluids, talk with your doctor before you increase the amount of fluids you drink. · Urinate when you have the urge. Do not hold your urine for a long time. Urinate before you go to sleep. · Keep your penis clean. Catheter care  If you have a drainage tube (catheter) in place, the following steps will help you care for it. · Always wash your hands before and after touching your catheter. · Check the area around the urethra for inflammation or signs of infection. Signs of infection include irritated, swollen, red, or tender skin, or pus around the catheter. · Clean the area around the catheter with soap and water two times a day. Dry with a clean towel afterward. · Do not apply powder or lotion to the skin around the catheter. To empty the urine collection bag  · Wash your hands with soap and water. · Without touching the drain spout, remove the spout from its sleeve at the bottom of the collection bag. Open the valve on the spout.   · Let the urine flow out of the bag and into the toilet or a container. Do not let the tubing or drain spout touch anything. · After you empty the bag, clean the end of the drain spout with tissue and water. Close the valve and put the drain spout back into its sleeve at the bottom of the collection bag. · Wash your hands with soap and water. When should you call for help? Call your doctor now or seek immediate medical care if:  · Symptoms such as a fever, chills, nausea, or vomiting get worse or happen for the first time. · You have new pain in your back just below your rib cage. This is called flank pain. · There is new blood or pus in your urine. · You are not able to take or keep down your antibiotics. Watch closely for changes in your health, and be sure to contact your doctor if:  · You are not getting better after taking an antibiotic for 2 days. · Your symptoms go away but then come back. Where can you learn more? Go to http://[employer]/. Enter G587 in the search box to learn more about \"Urinary Tract Infections in Men: Care Instructions. \"  Current as of: November 28, 2016  Content Version: 11.3  © 1683-1878 [employer]. Care instructions adapted under license by [employer] (which disclaims liability or warranty for this information). If you have questions about a medical condition or this instruction, always ask your healthcare professional. [place] [address] any warranty or liability for your use of this information. Go to http://[employer].info/. Mg Krueger in the search box to learn more about \"Cellulitis: Care Instructions. \"  Current as of: October 13, 2016  Content Version: 11.3  © 3607-1787 [employer]. Care instructions adapted under license by [employer] (which disclaims liability or warranty for this information).  If you have questions about a medical condition or this instruction, always ask your healthcare professional. [employer] disclaims any warranty or liability for your use of this information.   Patient armband removed and shredded

## 2017-07-03 NOTE — ROUTINE PROCESS
Dual AVS reviewed with Kody Johnson RN. All medications reviewed individually with patient. Opportunities for questions and concerns provided. Patient discharged via (mode of transport ie. Car, ambulance or air transport) car. Patient's arm band appropriately discarded.

## 2017-07-04 LAB
BACTERIA SPEC CULT: NORMAL
SERVICE CMNT-IMP: NORMAL

## 2017-11-09 ENCOUNTER — APPOINTMENT (OUTPATIENT)
Dept: GENERAL RADIOLOGY | Age: 62
End: 2017-11-09
Attending: EMERGENCY MEDICINE
Payer: MEDICARE

## 2017-11-09 ENCOUNTER — APPOINTMENT (OUTPATIENT)
Dept: CT IMAGING | Age: 62
End: 2017-11-09
Attending: EMERGENCY MEDICINE
Payer: MEDICARE

## 2017-11-09 ENCOUNTER — HOSPITAL ENCOUNTER (EMERGENCY)
Age: 62
Discharge: HOME | End: 2017-11-09
Attending: EMERGENCY MEDICINE
Payer: MEDICARE

## 2017-11-09 VITALS
DIASTOLIC BLOOD PRESSURE: 72 MMHG | BODY MASS INDEX: 33.13 KG/M2 | RESPIRATION RATE: 18 BRPM | OXYGEN SATURATION: 96 % | HEIGHT: 62 IN | HEART RATE: 97 BPM | SYSTOLIC BLOOD PRESSURE: 105 MMHG | TEMPERATURE: 98.1 F | WEIGHT: 180 LBS

## 2017-11-09 DIAGNOSIS — S00.512A ABRASION OF TONGUE, INITIAL ENCOUNTER: ICD-10-CM

## 2017-11-09 DIAGNOSIS — Q90.9 DOWN'S SYNDROME: ICD-10-CM

## 2017-11-09 DIAGNOSIS — R56.9 SEIZURE (HCC): Primary | ICD-10-CM

## 2017-11-09 DIAGNOSIS — N39.0 URINARY TRACT INFECTION WITHOUT HEMATURIA, SITE UNSPECIFIED: ICD-10-CM

## 2017-11-09 LAB
ALBUMIN SERPL-MCNC: 3.1 G/DL (ref 3.4–5)
ALBUMIN/GLOB SERPL: 0.7 {RATIO} (ref 0.8–1.7)
ALP SERPL-CCNC: 62 U/L (ref 45–117)
ALT SERPL-CCNC: 44 U/L (ref 16–61)
ANION GAP SERPL CALC-SCNC: 9 MMOL/L (ref 3–18)
APPEARANCE UR: ABNORMAL
AST SERPL-CCNC: 41 U/L (ref 15–37)
BACTERIA URNS QL MICRO: ABNORMAL /HPF
BASOPHILS # BLD: 0.1 K/UL (ref 0–0.06)
BASOPHILS NFR BLD: 1 % (ref 0–2)
BILIRUB SERPL-MCNC: 0.5 MG/DL (ref 0.2–1)
BILIRUB UR QL: NEGATIVE
BUN SERPL-MCNC: 15 MG/DL (ref 7–18)
BUN/CREAT SERPL: 14 (ref 12–20)
CALCIUM SERPL-MCNC: 8.8 MG/DL (ref 8.5–10.1)
CHLORIDE SERPL-SCNC: 105 MMOL/L (ref 100–108)
CK MB CFR SERPL CALC: 0.5 % (ref 0–4)
CK MB SERPL-MCNC: 2.3 NG/ML (ref 5–25)
CK SERPL-CCNC: 463 U/L (ref 39–308)
CO2 SERPL-SCNC: 28 MMOL/L (ref 21–32)
COLOR UR: YELLOW
CREAT SERPL-MCNC: 1.11 MG/DL (ref 0.6–1.3)
DIFFERENTIAL METHOD BLD: ABNORMAL
EOSINOPHIL # BLD: 0.1 K/UL (ref 0–0.4)
EOSINOPHIL NFR BLD: 1 % (ref 0–5)
EPITH CASTS URNS QL MICRO: ABNORMAL /LPF (ref 0–5)
ERYTHROCYTE [DISTWIDTH] IN BLOOD BY AUTOMATED COUNT: 13.1 % (ref 11.6–14.5)
GLOBULIN SER CALC-MCNC: 4.4 G/DL (ref 2–4)
GLUCOSE SERPL-MCNC: 99 MG/DL (ref 74–99)
GLUCOSE UR STRIP.AUTO-MCNC: NEGATIVE MG/DL
HCT VFR BLD AUTO: 43.4 % (ref 36–48)
HGB BLD-MCNC: 15 G/DL (ref 13–16)
HGB UR QL STRIP: ABNORMAL
KETONES UR QL STRIP.AUTO: NEGATIVE MG/DL
LEUKOCYTE ESTERASE UR QL STRIP.AUTO: ABNORMAL
LYMPHOCYTES # BLD: 2.5 K/UL (ref 0.9–3.6)
LYMPHOCYTES NFR BLD: 34 % (ref 21–52)
MAGNESIUM SERPL-MCNC: 2.2 MG/DL (ref 1.6–2.6)
MCH RBC QN AUTO: 34.5 PG (ref 24–34)
MCHC RBC AUTO-ENTMCNC: 34.6 G/DL (ref 31–37)
MCV RBC AUTO: 99.8 FL (ref 74–97)
MONOCYTES # BLD: 0.4 K/UL (ref 0.05–1.2)
MONOCYTES NFR BLD: 6 % (ref 3–10)
NEUTS SEG # BLD: 4.3 K/UL (ref 1.8–8)
NEUTS SEG NFR BLD: 58 % (ref 40–73)
NITRITE UR QL STRIP.AUTO: NEGATIVE
PH UR STRIP: 8 [PH] (ref 5–8)
PLATELET # BLD AUTO: 184 K/UL (ref 135–420)
PMV BLD AUTO: 10.4 FL (ref 9.2–11.8)
POTASSIUM SERPL-SCNC: 4.3 MMOL/L (ref 3.5–5.5)
PROT SERPL-MCNC: 7.5 G/DL (ref 6.4–8.2)
PROT UR STRIP-MCNC: NEGATIVE MG/DL
RBC # BLD AUTO: 4.35 M/UL (ref 4.7–5.5)
RBC #/AREA URNS HPF: ABNORMAL /HPF (ref 0–5)
SODIUM SERPL-SCNC: 142 MMOL/L (ref 136–145)
SP GR UR REFRACTOMETRY: 1.01 (ref 1–1.03)
TROPONIN I SERPL-MCNC: <0.02 NG/ML (ref 0–0.06)
UROBILINOGEN UR QL STRIP.AUTO: 0.2 EU/DL (ref 0.2–1)
VALPROATE SERPL-MCNC: 15 UG/ML (ref 50–100)
WBC # BLD AUTO: 7.3 K/UL (ref 4.6–13.2)
WBC URNS QL MICRO: ABNORMAL /HPF (ref 0–5)

## 2017-11-09 PROCEDURE — 74011000258 HC RX REV CODE- 258: Performed by: EMERGENCY MEDICINE

## 2017-11-09 PROCEDURE — 87086 URINE CULTURE/COLONY COUNT: CPT | Performed by: EMERGENCY MEDICINE

## 2017-11-09 PROCEDURE — 74011250636 HC RX REV CODE- 250/636: Performed by: EMERGENCY MEDICINE

## 2017-11-09 PROCEDURE — 84146 ASSAY OF PROLACTIN: CPT | Performed by: EMERGENCY MEDICINE

## 2017-11-09 PROCEDURE — 77030011943

## 2017-11-09 PROCEDURE — 87186 SC STD MICRODIL/AGAR DIL: CPT | Performed by: EMERGENCY MEDICINE

## 2017-11-09 PROCEDURE — 51701 INSERT BLADDER CATHETER: CPT

## 2017-11-09 PROCEDURE — 80164 ASSAY DIPROPYLACETIC ACD TOT: CPT | Performed by: EMERGENCY MEDICINE

## 2017-11-09 PROCEDURE — 80053 COMPREHEN METABOLIC PANEL: CPT | Performed by: EMERGENCY MEDICINE

## 2017-11-09 PROCEDURE — 96367 TX/PROPH/DG ADDL SEQ IV INF: CPT

## 2017-11-09 PROCEDURE — 71010 XR CHEST PORT: CPT

## 2017-11-09 PROCEDURE — 93005 ELECTROCARDIOGRAM TRACING: CPT

## 2017-11-09 PROCEDURE — 83735 ASSAY OF MAGNESIUM: CPT | Performed by: EMERGENCY MEDICINE

## 2017-11-09 PROCEDURE — 96361 HYDRATE IV INFUSION ADD-ON: CPT

## 2017-11-09 PROCEDURE — 85025 COMPLETE CBC W/AUTO DIFF WBC: CPT | Performed by: EMERGENCY MEDICINE

## 2017-11-09 PROCEDURE — 82550 ASSAY OF CK (CPK): CPT | Performed by: EMERGENCY MEDICINE

## 2017-11-09 PROCEDURE — 81001 URINALYSIS AUTO W/SCOPE: CPT | Performed by: EMERGENCY MEDICINE

## 2017-11-09 PROCEDURE — 96365 THER/PROPH/DIAG IV INF INIT: CPT

## 2017-11-09 PROCEDURE — 74011000250 HC RX REV CODE- 250: Performed by: EMERGENCY MEDICINE

## 2017-11-09 PROCEDURE — 87077 CULTURE AEROBIC IDENTIFY: CPT | Performed by: EMERGENCY MEDICINE

## 2017-11-09 PROCEDURE — 70450 CT HEAD/BRAIN W/O DYE: CPT

## 2017-11-09 PROCEDURE — 99285 EMERGENCY DEPT VISIT HI MDM: CPT

## 2017-11-09 RX ORDER — CEPHALEXIN 250 MG/5ML
500 POWDER, FOR SUSPENSION ORAL 2 TIMES DAILY
Qty: 140 ML | Refills: 0 | Status: SHIPPED | OUTPATIENT
Start: 2017-11-09 | End: 2017-11-16

## 2017-11-09 RX ORDER — VALPROIC ACID 250 MG/5ML
500 SOLUTION ORAL 2 TIMES DAILY
Qty: 400 ML | Refills: 0 | Status: SHIPPED | OUTPATIENT
Start: 2017-11-09 | End: 2017-11-29

## 2017-11-09 RX ORDER — LORAZEPAM 2 MG/ML
1 INJECTION INTRAMUSCULAR ONCE
Status: DISCONTINUED | OUTPATIENT
Start: 2017-11-09 | End: 2017-11-09

## 2017-11-09 RX ORDER — LORAZEPAM 2 MG/ML
1 INJECTION INTRAMUSCULAR ONCE
Status: DISCONTINUED | OUTPATIENT
Start: 2017-11-09 | End: 2017-11-09 | Stop reason: HOSPADM

## 2017-11-09 RX ADMIN — VALPROATE SODIUM 500 MG: 100 INJECTION, SOLUTION INTRAVENOUS at 10:38

## 2017-11-09 RX ADMIN — SODIUM CHLORIDE 1000 ML: 900 INJECTION, SOLUTION INTRAVENOUS at 08:24

## 2017-11-09 RX ADMIN — CEFTRIAXONE SODIUM 1 G: 1 INJECTION, POWDER, FOR SOLUTION INTRAMUSCULAR; INTRAVENOUS at 11:39

## 2017-11-09 NOTE — ED PROVIDER NOTES
[address]  EMERGENCY DEPARTMENT HISTORY AND PHYSICAL EXAM       Date: 11/9/2017   Patient Name: Nelva Buerger   YOB: 1955  Medical Record Number: [id number]    History of Presenting Illness     Chief Complaint   Patient presents with    Seizure        History Provided By:  caregiver    Additional History: 7:10 AM  Nelva Buerger is a 62 y.o. male with PMHx of Down's Syndrome, Alzheimer's, Demantia, and seizure presenting to the ED via EMS C/O seizure like activity onset 1.5 hours ago. Associated sxs include blood in his mouth, shaking for 2-3 minutes and right eye redness. Caregiver states that while he was getting dressed he became extremely red in the face and blood started to come from his mouth. States he not speaking at baseline. Pt is typically responses to questions, verbal commands, and able to help himself get dressed. Pt is wheel-chair bound. Pt is not normally cross-eyed. Caregiver is new and states she does not know everything. Caregivers confirm the pt has been taking his medication. Caregiver conformed Hx of seizures. [facility] [place] Caregiver denies any changes in medications, and any other symptoms or complaints at this time. HPI is is limited due to pt being nonverbal.     Primary Care Provider: Nai Carlton DO   Specialist:    Past History     Past Medical History:   Past Medical History:   Diagnosis Date    Alzheimer's dementia     Down's syndrome     Seizure [facility])         Past Surgical History:   History reviewed. No pertinent surgical history. Family History:   History reviewed. No pertinent family history. Social History:   Social History   Substance Use Topics    Smoking status: Never Smoker    Smokeless tobacco: Never Used    Alcohol use None        Allergies:   No Known Allergies     Review of Systems   Review of Systems   Unable to perform ROS: Patient nonverbal   Constitutional: Negative for chills and fever. HENT:        Bit tongue   Neurological: Positive for tremors and seizures. All other systems reviewed and are negative. Physical Exam  Vitals:    11/09/17 1000 11/09/17 1030 11/09/17 1100 11/09/17 1130   BP: 107/57 126/78 126/81 105/72   Pulse: 83 83 82 97   Resp:       Temp:       SpO2:       Weight:       Height:           Physical Exam   Constitutional: He is oriented to person, place, and time. He appears well-developed and well-nourished. HENT:   Head: Normocephalic and atraumatic. Right Ear: External ear normal.   Left Ear: External ear normal.   Nose: Nose normal.   Mouth/Throat: Oropharynx is clear and moist.   Tongue abrasion   Eyes: Conjunctivae and EOM are normal. Pupils are equal, round, and reactive to light. Full range of motion of eyes. Neck: Normal range of motion. Neck supple. No JVD present. No tracheal deviation present. No thyromegaly present. Cardiovascular: Normal rate, regular rhythm, normal heart sounds and intact distal pulses. Exam reveals no gallop and no friction rub. No murmur heard. Pulmonary/Chest: Effort normal and breath sounds normal. No respiratory distress. He has no wheezes. He has no rales. Abdominal: Soft. Bowel sounds are normal. He exhibits no distension and no mass. There is no tenderness. There is no rebound and no guarding. Musculoskeletal: He exhibits no edema or deformity. Patient moves bilateral upper extremities with decreased movement bilateral lower extremities   Neurological: He is alert and oriented to person, place, and time. He has normal reflexes. No cranial nerve deficit. Pt. is nonverbal but follows commands. Skin: Skin is warm and dry. No rash noted. Psychiatric:   Flat affect   Nursing note and vitals reviewed.         Diagnostic Study Results     Labs -   Recent Results (from the past 24 hour(s))   CBC WITH AUTOMATED DIFF    Collection Time: 11/09/17  6:55 AM   Result Value Ref Range    WBC 7.3 4.6 - 13.2 K/uL    RBC 4.35 (L) 4.70 - 5.50 M/uL    HGB 15.0 13.0 - 16.0 g/dL    HCT 43.4 36.0 - 48.0 %    MCV 99.8 (H) 74.0 - 97.0 FL    MCH 34.5 (H) 24.0 - 34.0 PG    MCHC 34.6 31.0 - 37.0 g/dL    RDW 13.1 11.6 - 14.5 %    PLATELET 654 225 - 803 K/uL    MPV 10.4 9.2 - 11.8 FL    NEUTROPHILS 58 40 - 73 %    LYMPHOCYTES 34 21 - 52 %    MONOCYTES 6 3 - 10 %    EOSINOPHILS 1 0 - 5 %    BASOPHILS 1 0 - 2 %    ABS. NEUTROPHILS 4.3 1.8 - 8.0 K/UL    ABS. LYMPHOCYTES 2.5 0.9 - 3.6 K/UL    ABS. MONOCYTES 0.4 0.05 - 1.2 K/UL    ABS. EOSINOPHILS 0.1 0.0 - 0.4 K/UL    ABS. BASOPHILS 0.1 (H) 0.0 - 0.06 K/UL    DF AUTOMATED     METABOLIC PANEL, COMPREHENSIVE    Collection Time: 11/09/17  6:55 AM   Result Value Ref Range    Sodium 142 136 - 145 mmol/L    Potassium 4.3 3.5 - 5.5 mmol/L    Chloride 105 100 - 108 mmol/L    CO2 28 21 - 32 mmol/L    Anion gap 9 3.0 - 18 mmol/L    Glucose 99 74 - 99 mg/dL    BUN 15 7.0 - 18 MG/DL    Creatinine 1.11 0.6 - 1.3 MG/DL    BUN/Creatinine ratio 14 12 - 20      GFR est AA >60 >60 ml/min/1.73m2    GFR est non-AA >60 >60 ml/min/1.73m2    Calcium 8.8 8.5 - 10.1 MG/DL    Bilirubin, total 0.5 0.2 - 1.0 MG/DL    ALT (SGPT) 44 16 - 61 U/L    AST (SGOT) 41 (H) 15 - 37 U/L    Alk.  phosphatase 62 45 - 117 U/L    Protein, total 7.5 6.4 - 8.2 g/dL    Albumin 3.1 (L) 3.4 - 5.0 g/dL    Globulin 4.4 (H) 2.0 - 4.0 g/dL    A-G Ratio 0.7 (L) 0.8 - 1.7     CARDIAC PANEL,(CK, CKMB & TROPONIN)    Collection Time: 11/09/17  6:55 AM   Result Value Ref Range     (H) 39 - 308 U/L    CK - MB 2.3 <3.6 ng/ml    CK-MB Index 0.5 0.0 - 4.0 %    Troponin-I, Qt. <0.02 0.00 - 0.06 NG/ML   MAGNESIUM    Collection Time: 11/09/17  6:55 AM   Result Value Ref Range    Magnesium 2.2 1.6 - 2.6 mg/dL   VALPROIC ACID    Collection Time: 11/09/17  7:50 AM   Result Value Ref Range    Valproic acid 15 (L) 50 - 100 ug/ml   URINALYSIS W/ RFLX MICROSCOPIC    Collection Time: 11/09/17 10:25 AM   Result Value Ref Range    Color YELLOW      Appearance CLOUDY Specific gravity 1.011 1.005 - 1.030      pH (UA) 8.0 5.0 - 8.0      Protein NEGATIVE  NEG mg/dL    Glucose NEGATIVE  NEG mg/dL    Ketone NEGATIVE  NEG mg/dL    Bilirubin NEGATIVE  NEG      Blood MODERATE (A) NEG      Urobilinogen 0.2 0.2 - 1.0 EU/dL    Nitrites NEGATIVE  NEG      Leukocyte Esterase LARGE (A) NEG     URINE MICROSCOPIC ONLY    Collection Time: 11/09/17 10:25 AM   Result Value Ref Range    WBC 10 to 15 0 - 5 /hpf    RBC 25 to 30 0 - 5 /hpf    Epithelial cells FEW 0 - 5 /lpf    Bacteria 2+ (A) NEG /hpf   EKG, 12 LEAD, INITIAL    Collection Time: 11/09/17 11:19 AM   Result Value Ref Range    Ventricular Rate 84 BPM    Atrial Rate 84 BPM    P-R Interval 142 ms    QRS Duration 78 ms    Q-T Interval 364 ms    QTC Calculation (Bezet) 430 ms    Calculated P Axis 60 degrees    Calculated R Axis -3 degrees    Calculated T Axis 40 degrees    Diagnosis       Normal sinus rhythm  Normal ECG  No previous ECGs available           Radiologic Studies -  The following have been ordered and reviewed:   XR CHEST PORT   Final Result   IMPRESSION:     1. Stable exam., Pulmonary hypoinflation. No acute cardiopulmonary process. As read by the radiologist.     CT HEAD WO CONT   Final Result   IMPRESSION:        1. No evidence of acute intracranial abnormality. 2. Disproportionate widening of the ventricles relative to the degree of sulcal  prominence, likely central prominent atrophy versus normal pressure  hydrocephalus. Consider follow-up nonemergent MRI for further characterization. As read by the radiologist.       Medical Decision Making   I am the first provider for this patient. I reviewed the vital signs, available nursing notes, past medical history, past surgical history, family history and social history. Vital Signs-Reviewed the patient's vital signs. No data found. Pulse Oximetry Analysis - Normal 96% on RA.      Cardiac Monitor:   Rate: 79 bpm  Rhythm: Normal Sinus Rhythm      EKG interpretation: (Preliminary)  Rhythm: NSR. Rate (approx.): 84 bpm; No acute changes. EKG read by Marco Martínez MD at 11:23 AM    Old Medical Records: Nursing notes. Provider Notes:   INITIAL CLINICAL IMPRESSION and PLANS:  The patient presents with the primary complaint(s) of: seizure like activity. The presentation, to include historical aspects and clinical findings are consistent with the DX of recurrent seizure. However, other possible DX's to consider as primary, associated with, or exacerbated by include:    1. Medication noncompliance  2. Infection  3. Dehydration    Considering the above, my initial management plan to evaluate and therapeutic interventions include the following and as noted in the orders:    1. Labs: Valproic Acid, Prolactin, Magnesium, Cardiac Panel, UA, CMP, CBC  2. Imaging: XR Chest Port  3. EKG     Procedures:   Procedures    ED Course:  7:10 AM  Initial assessment performed. The patients presenting problems have been discussed, and they are in agreement with the care plan formulated and outlined with them. I have encouraged them to ask questions as they arise throughout their visit. 8:15 AM Discussed patient's history, exam, and available diagnostics results with Oxana Cosme MD, Psychiatry, who recommends a head scan and increase Depakote level. 9:46 AM Caregiver states pt is approaching his baseline. 9:48 AM Discussed patient's history, exam, and available diagnostics results with Danny Wells MD, Neurology, who states to discharge on a higher dose of Depakote. Medications Given in the ED:  Medications   sodium chloride 0.9 % bolus infusion 1,000 mL (0 mL IntraVENous IV Completed 11/9/17 0956)   valproate (DEPACON) 500 mg in 0.9% sodium chloride 50 mL IVPB (0 mg IntraVENous IV Completed 11/9/17 1139)   cefTRIAXone (ROCEPHIN) 1 g in 0.9% sodium chloride 50 mL w/ adapter (0 g IntraVENous IV Completed 11/9/17 1216)     Discussion:  Pt was stable in ED. Initially patient was postictal on exam, but patient returned to baseline over time. Head CT showed atrophy without acute changes. Labs were unremarkable. Depakote level was low. UA showed UTI. Pt was given IV Depakote and Rocephin for UTI. Case discussed with Dr. Kendra Mcgrath, pts psychiatrist and Mil Herzog MD, who recommended increasing Depakote dose and follow up as an outpatient. Pt was given treatment for UTI. Discharge Note:  11:14 AM  Pt has been reexamined. Patient has no new complaints, changes, or physical findings. Care plan outlined and precautions discussed. Results were reviewed with the patient. All medications were reviewed with the patient; will d/c home with Keflex and Depakene. All of pt's questions and concerns were addressed. Patient was instructed and agrees to follow up with PCP and Neurology, as well as to return to the ED upon further deterioration. Patient is ready to go home. Diagnosis   Clinical Impression:   1. Seizure ([address]    2. Urinary tract infection without hematuria, site unspecified    3. Down's syndrome    4. Abrasion of tongue, initial encounter           Follow-up Information     Follow up With Details Comments Contact Info    Mil Herzog MD Schedule an appointment as soon as possible for a visit in 2 days For neurology follow up. [facility]/ [address]  [address] [place]” [facility],  Schedule an appointment as soon as possible for a visit in 3 days For primary care follow up. [address]      Randall Kehr, MD Schedule an appointment as soon as possible for a visit in 3 days For psychiatry follow up.  [address] [id number]  [phone]      [facility] EMERGENCY DEPT Go to As needed, If symptoms worsen [address] [place] [address]  [phone]          Discharge Medication List as of 11/9/2017 11:14 AM      START taking these medications    Details valproate (DEPAKENE) 250 mg/5 mL syrup Take 10 mL by mouth two (2) times a day for 20 days. , Print, Disp-400 mL, R-0      cephALEXin (KEFLEX) 250 mg/5 mL suspension Take 10 mL by mouth two (2) times a day for 7 days. , Print, Disp-140 mL, R-0         CONTINUE these medications which have NOT CHANGED    Details   LORazepam (ATIVAN) 1 mg tablet Take 1 Tab by mouth every four (4) hours as needed for Anxiety. Max Daily Amount: 6 mg., Print, Disp-2 Tab, R-0      divalproex (DEPAKOTE SPRINKLE) 125 mg capsule Take 2 Caps by mouth two (2) times a day., Print, Disp-60 Cap, R-0      pantoprazole (PROTONIX) 40 mg tablet Take 1 Tab by mouth two (2) times a day., Print, Disp-30 Tab, R-0      citalopram (CELEXA) 20 mg tablet Take  by mouth daily. , Historical Med      omega-3 fatty acids-vitamin e (FISH OIL) 1,000 mg cap Take 1 Cap by mouth., Historical Med      fluticasone (FLONASE) 50 mcg/actuation nasal spray 2 Sprays by Both Nostrils route daily. , Historical Med      nutritional supplements (BROOKS) pack Take  by mouth., Historical Med      !! risperiDONE (RISPERDAL) 1 mg tablet Take  by mouth daily. , Historical Med      acetaminophen (TYLENOL) 650 mg CR tablet Take 650 mg by mouth every six (6) hours as needed for Pain., Historical Med      bisacodyl (BISCOLAX) 10 mg suppository Insert 10 mg into rectum daily. , Historical Med      loperamide (IMMODIUM) 2 mg tablet Take 2 mg by mouth four (4) times daily as needed for Diarrhea., Historical Med      methocarbamol (ROBAXIN) 500 mg tablet Take 500 mg by mouth four (4) times daily. , Historical Med      MAG HYDROX/AL HYDROX/SIMETH (MINTOX PO) Take 15 mL by mouth daily as needed., Historical Med      naproxen (NAPROSYN) 500 mg tablet Take 500 mg by mouth two (2) times daily (with meals). , Historical Med      !! risperiDONE (RISPERDAL) 0.5 mg tablet Take 0.5 mg by mouth as needed., Historical Med      PSEUDOEPHEDRINE-dextromethorphan-guaiFENesin (ROBAFEN CF) 30- mg/5 mL solution Take 5 mL by mouth every four (4) hours as needed for Cough., Historical Med       !! - Potential duplicate medications found. Please discuss with provider.          _______________________________   Attestations: This note is prepared by Toshia Kong, acting as a Scribe for Hortensia Jha MD on 7:15 AM on 11/9/2017. Hortensia Jha MD: The scribe's documentation has been prepared under my direction and personally reviewed by me in its entirety.   _______________________________

## 2017-11-09 NOTE — ED TRIAGE NOTES
Per EMS, called to group home for \"blood in mouth and not responding normally\". Caregiver from group home states while dressing him for day \"his face got real red and blood started coming from his mouth and he is not talking like he normally does\". Awake and alert, tracks with eyes, not answering questions verbally. Hx Down's Syndrome, Alzheimer's and Demantia. Sepsis Screening completed    (  )Patient meets SIRS criteria. ( xx )Patient does not meet SIRS criteria.       SIRS Criteria is achieved when two or more of the following are present   Temperature < 96.8°F (36°C) or > 100.9°F (38.3°C)   Heart Rate > 90 beats per minute   Respiratory Rate > 20 breaths per minute   WBC count > 12,000 or <4,000 or > 10% bands

## 2017-11-09 NOTE — ED NOTES
Pt with Down's syndrome arrived via EMS. As per caregiver, \"I was getting him ready for the day shift and he turned red and there was blood from his mouth\"  Pt noted to have dried blood around lips. No active bleeding noted. Pt alert and awake but as per caregiver pt usually verbalizes and is interactive. Pt not following commands or verbalizing at this time. VS stable on cardiac monitor. Breathing with ease on room air. Continue frequent monitoring. Maintain safety precautions.

## 2017-11-09 NOTE — ED NOTES
After initial attempt by primary RN unsuccessful, attempted straight cath with 12FR coude tip, caregiver at bedside - resistance met, cath unsuccessful - primary RN Meera Whelan and MD Nelda Oh notified.

## 2017-11-09 NOTE — DISCHARGE INSTRUCTIONS
Seizure: Care Instructions  Your Care Instructions    Seizures are caused by abnormal patterns of electrical signals in the brain. They are different for each person. Seizures can affect movement, speech, vision, or awareness. Some people have only slight shaking of a hand and do not pass out. Other people may pass out and have violent shaking of the whole body. Some people appear to stare into space. They are awake, but they can't respond normally. Later, they may not remember what happened. You may need tests to identify the type and cause of the seizures. A seizure may occur only once, or you may have them more than one time. Taking medicines as directed and following up with your doctor may help keep you from having more seizures. The doctor has checked you carefully, but problems can develop later. If you notice any problems or new symptoms, get medical treatment right away. Follow-up care is a key part of your treatment and safety. Be sure to make and go to all appointments, and call your doctor if you are having problems. It's also a good idea to know your test results and keep a list of the medicines you take. How can you care for yourself at home? · Be safe with medicines. Take your medicines exactly as prescribed. Call your doctor if you think you are having a problem with your medicine. · Do not do any activity that could be dangerous to you or others until your doctor says it is safe to do so. For example, do not drive a car, operate machinery, swim, or climb ladders. · Be sure that anyone treating you for any health problem knows that you have had a seizure and what medicines you are taking for it. · Identify and avoid things that may make you more likely to have a seizure. These may include lack of sleep, alcohol or drug use, stress, or not eating. · Make sure you go to your follow-up appointment. When should you call for help? Call 911 anytime you think you may need emergency care. For example, call if:  ? · You have another seizure. ? · You have more than one seizure in 24 hours. ? · You have new symptoms, such as trouble walking, speaking, or thinking clearly. ?Call your doctor now or seek immediate medical care if:  ? · You are not acting normally. ? Watch closely for changes in your health, and be sure to contact your doctor if you have any problems. Where can you learn more? Go to http://lavern-argenis.info/. Enter M747 in the search box to learn more about \"Seizure: Care Instructions. \"  Current as of: October 14, 2016  Content Version: 11.4  © 2908-3146 [employer]. Care instructions adapted under license by [employer] (which disclaims liability or warranty for this information). If you have questions about a medical condition or this instruction, always ask your healthcare professional. [place] [address] any warranty or liability for your use of this information. Urinary Tract Infections in Men: Care Instructions  Your Care Instructions    A urinary tract infection, or UTI, is a general term for an infection anywhere between the kidneys and the tip of the penis. UTIs can also be a result of a prostate problem. Most cause pain or burning when you urinate. Most UTIs are caused by bacteria and can be cured with antibiotics. It is important to complete your treatment so that the infection does not get worse. Follow-up care is a key part of your treatment and safety. Be sure to make and go to all appointments, and call your doctor if you are having problems. It's also a good idea to know your test results and keep a list of the medicines you take. How can you care for yourself at home? · Take your antibiotics as prescribed. Do not stop taking them just because you feel better. You need to take the full course of antibiotics. · Take your medicines exactly as prescribed.  Your doctor may have prescribed a medicine, such as phenazopyridine (Pyridium), to help relieve pain when you urinate. This turns your urine orange. You may stop taking it when your symptoms get better. But be sure to take all of your antibiotics, which treat the infection. · Drink extra water for the next day or two. This will help make the urine less concentrated and help wash out the bacteria causing the infection. (If you have kidney, heart, or liver disease and have to limit your fluids, talk with your doctor before you increase your fluid intake.)  · Avoid drinks that are carbonated or have caffeine. They can irritate the bladder. · Urinate often. Try to empty your bladder each time. · To relieve pain, take a hot bath or lay a heating pad (set on low) over your lower belly or genital area. Never go to sleep with a heating pad in place. To help prevent UTIs  · Drink plenty of fluids, enough so that your urine is light yellow or clear like water. If you have kidney, heart, or liver disease and have to limit fluids, talk with your doctor before you increase the amount of fluids you drink. · Urinate when you have the urge. Do not hold your urine for a long time. Urinate before you go to sleep. · Keep your penis clean. Catheter care  If you have a drainage tube (catheter) in place, the following steps will help you care for it. · Always wash your hands before and after touching your catheter. · Check the area around the urethra for inflammation or signs of infection. Signs of infection include irritated, swollen, red, or tender skin, or pus around the catheter. · Clean the area around the catheter with soap and water two times a day. Dry with a clean towel afterward. · Do not apply powder or lotion to the skin around the catheter. To empty the urine collection bag  · Wash your hands with soap and water. · Without touching the drain spout, remove the spout from its sleeve at the bottom of the collection bag.  Open the valve on the spout.  · Let the urine flow out of the bag and into the toilet or a container. Do not let the tubing or drain spout touch anything. · After you empty the bag, clean the end of the drain spout with tissue and water. Close the valve and put the drain spout back into its sleeve at the bottom of the collection bag. · Wash your hands with soap and water. When should you call for help? Call your doctor now or seek immediate medical care if:  ? · Symptoms such as a fever, chills, nausea, or vomiting get worse or happen for the first time. ? · You have new pain in your back just below your rib cage. This is called flank pain. ? · There is new blood or pus in your urine. ? · You are not able to take or keep down your antibiotics. ? Watch closely for changes in your health, and be sure to contact your doctor if:  ? · You are not getting better after taking an antibiotic for 2 days. ? · Your symptoms go away but then come back. Where can you learn more? Go to http://lavern-argenis.info/. Enter J140 in the search box to learn more about \"Urinary Tract Infections in Men: Care Instructions. \"  Current as of: May 12, 2017  Content Version: 11.4  © 2617-6628 [employer]. Care instructions adapted under license by [employer] (which disclaims liability or warranty for this information). If you have questions about a medical condition or this instruction, always ask your healthcare professional. [place] [address] any warranty or liability for your use of this information.

## 2017-11-09 NOTE — ED NOTES
Verbal report given to life care transport team. Life care transport team given discharge instructions, prescriptions and group home patient record. Pt loaded onto life care stretcher and taken from department.

## 2017-11-10 LAB
ATRIAL RATE: 84 BPM
CALCULATED P AXIS, ECG09: 60 DEGREES
CALCULATED R AXIS, ECG10: -3 DEGREES
CALCULATED T AXIS, ECG11: 40 DEGREES
DIAGNOSIS, 93000: NORMAL
P-R INTERVAL, ECG05: 142 MS
Q-T INTERVAL, ECG07: 364 MS
QRS DURATION, ECG06: 78 MS
QTC CALCULATION (BEZET), ECG08: 430 MS
VENTRICULAR RATE, ECG03: 84 BPM

## 2017-11-13 LAB
BACTERIA SPEC CULT: ABNORMAL
PROLACTIN SERPL-MCNC: 37.6 NG/ML
SERVICE CMNT-IMP: ABNORMAL

## 2018-02-20 ENCOUNTER — HOSPITAL ENCOUNTER (OUTPATIENT)
Dept: MRI IMAGING | Age: 63
Discharge: HOME | End: 2018-02-20
Attending: INTERNAL MEDICINE
Payer: MEDICARE

## 2018-02-20 DIAGNOSIS — R56.9 SEIZURE (HCC): ICD-10-CM

## 2018-02-20 DIAGNOSIS — F02.80 ALZHEIMER'S DISEASE WITH EARLY ONSET (HCC): ICD-10-CM

## 2018-02-20 DIAGNOSIS — R13.10 DYSPHAGIA: ICD-10-CM

## 2018-02-20 DIAGNOSIS — G30.0 ALZHEIMER'S DISEASE WITH EARLY ONSET (HCC): ICD-10-CM

## 2018-02-20 LAB — CREAT UR-MCNC: 0.8 MG/DL (ref 0.6–1.3)

## 2018-02-20 PROCEDURE — 70553 MRI BRAIN STEM W/O & W/DYE: CPT

## 2018-02-20 PROCEDURE — 74011250636 HC RX REV CODE- 250/636: Performed by: INTERNAL MEDICINE

## 2018-02-20 PROCEDURE — A9577 INJ MULTIHANCE: HCPCS | Performed by: INTERNAL MEDICINE

## 2018-02-20 PROCEDURE — 82565 ASSAY OF CREATININE: CPT

## 2018-02-20 RX ADMIN — GADOBENATE DIMEGLUMINE 15 ML: 529 INJECTION, SOLUTION INTRAVENOUS at 10:39

## 2018-05-06 ENCOUNTER — HOSPITAL ENCOUNTER (EMERGENCY)
Age: 63
Discharge: HOME | End: 2018-05-06
Attending: EMERGENCY MEDICINE | Admitting: EMERGENCY MEDICINE
Payer: MEDICARE

## 2018-05-06 ENCOUNTER — APPOINTMENT (OUTPATIENT)
Dept: GENERAL RADIOLOGY | Age: 63
End: 2018-05-06
Attending: PHYSICIAN ASSISTANT
Payer: MEDICARE

## 2018-05-06 VITALS
WEIGHT: 160 LBS | HEART RATE: 55 BPM | BODY MASS INDEX: 27.31 KG/M2 | HEIGHT: 64 IN | RESPIRATION RATE: 18 BRPM | TEMPERATURE: 97.9 F | OXYGEN SATURATION: 95 % | SYSTOLIC BLOOD PRESSURE: 99 MMHG | DIASTOLIC BLOOD PRESSURE: 69 MMHG
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
## 2018-05-06 PROCEDURE — 72040 X-RAY EXAM NECK SPINE 2-3 VW: CPT

## 2018-05-06 PROCEDURE — 99282 EMERGENCY DEPT VISIT SF MDM: CPT

## 2018-05-06 PROCEDURE — 71101 X-RAY EXAM UNILAT RIBS/CHEST: CPT

## 2018-05-06 NOTE — ED NOTES
I have reviewed discharge instructions with the caregiver. The caregiver verbalized understanding.   Patient armband removed and shredded, no scripts given

## 2018-05-06 NOTE — ED PROVIDER NOTES
EMERGENCY DEPARTMENT HISTORY AND PHYSICAL EXAM    Date: 5/6/2018  Patient Name: Remberto Artis    History of Presenting Illness     Chief Complaint   Patient presents with   [facility] Fall         History Provided By: [facility] Staff Member    Chief Complaint: fall    Additional History (Context):   4:09 PM  Remberto Artis is a 65 y.o. male with PMHX of seizures, Down's syndrome, HTN, dermatitis who presents to the emergency department via EMS from [facility] C/O fall onset this afternoon PTA. Staff member reports patient fell off the [employer] lift toward the left side. Staff member reports she caught him from hitting the ground and states the left side of his body hit the chair. Staff members noticed red marks on the left side of chest. Denies head injury or LOC. Staff member states that he does not appear to be in pain and protocol required patient to be evaluated in the emergency room. Pt is non verbal and bed ridden. Nurse staff reports pt at baseline. Staff member denies any other Sx or complaints. HPI is limited by patient's nonverbal.       PCP: Chente Ferrell, DO    Current Outpatient Prescriptions   Medication Sig Dispense Refill    LORazepam (ATIVAN) 1 mg tablet Take 1 Tab by mouth every four (4) hours as needed for Anxiety. Max Daily Amount: 6 mg. 2 Tab 0    divalproex (DEPAKOTE SPRINKLE) 125 mg capsule Take 2 Caps by mouth two (2) times a day. 60 Cap 0    pantoprazole (PROTONIX) 40 mg tablet Take 1 Tab by mouth two (2) times a day. 30 Tab 0    citalopram (CELEXA) 20 mg tablet Take  by mouth daily.  omega-3 fatty acids-vitamin e (FISH OIL) 1,000 mg cap Take 1 Cap by mouth.  fluticasone (FLONASE) 50 mcg/actuation nasal spray 2 Sprays by Both Nostrils route daily.  nutritional supplements (BROOKS) pack Take  by mouth.  risperiDONE (RISPERDAL) 1 mg tablet Take  by mouth daily.  acetaminophen (TYLENOL) 650 mg CR tablet Take 650 mg by mouth every six (6) hours as needed for Pain.       bisacodyl (BISCOLAX) 10 mg suppository Insert 10 mg into rectum daily.  loperamide (IMMODIUM) 2 mg tablet Take 2 mg by mouth four (4) times daily as needed for Diarrhea.  methocarbamol (ROBAXIN) 500 mg tablet Take 500 mg by mouth four (4) times daily.  MAG HYDROX/AL HYDROX/SIMETH (MINTOX PO) Take 15 mL by mouth daily as needed.  naproxen (NAPROSYN) 500 mg tablet Take 500 mg by mouth two (2) times daily (with meals).  risperiDONE (RISPERDAL) 0.5 mg tablet Take 0.5 mg by mouth as needed.  PSEUDOEPHEDRINE-dextromethorphan-guaiFENesin (ROBAFEN CF) 30- mg/5 mL solution Take 5 mL by mouth every four (4) hours as needed for Cough. Past History     Past Medical History:  Past Medical History:   Diagnosis Date    Alzheimer's dementia     Down's syndrome     Seizure ([facility] [address]        Past Surgical History:  History reviewed. No pertinent surgical history. Family History:  History reviewed. No pertinent family history. Social History:  Social History   Substance Use Topics    Smoking status: Never Smoker    Smokeless tobacco: Never Used    Alcohol use No       Allergies:  No Known Allergies      Review of Systems   Review of Systems   Unable to perform ROS: Patient nonverbal   Skin:        (+) red marks on chest        Physical Exam     Vitals:    05/06/18 1555   BP: 99/69   Pulse: (!) 55   Resp: 18   Temp: 97.9 °F (36.6 °C)   SpO2: 95%   Weight: 72.6 kg (160 lb)   Height: 5' 4\" (1.626 m)     Physical Exam   Constitutional: He appears well-developed and well-nourished. No distress. +downs features, is in NAD, nonverbal but watches me & makes good eye contact, does not appear to be in any pain with movement of extremities or neck   HENT:   Head: Normocephalic and atraumatic. Eyes: Conjunctivae and EOM are normal. Pupils are equal, round, and reactive to light. Neck: Normal range of motion. Neck supple. Cardiovascular: Normal rate and regular rhythm.     Pulmonary/Chest: Effort normal and breath sounds normal. No respiratory distress. He exhibits no tenderness. Abdominal: Soft. Bowel sounds are normal. He exhibits no distension. There is no tenderness. There is no rebound and no guarding. Musculoskeletal: Normal range of motion. He exhibits no edema or deformity. Right shoulder: Normal.        Left shoulder: He exhibits normal range of motion, no bony tenderness, no deformity, normal pulse and normal strength. Right elbow: Normal.       Left elbow: Normal.        Right wrist: Normal.        Left wrist: Normal.        Right hip: Normal.        Left hip: He exhibits normal range of motion and no tenderness. Right knee: Normal.        Left knee: Normal. He exhibits normal range of motion and no swelling. No tenderness found. Right ankle: Normal. He exhibits normal pulse. Left ankle: Normal. He exhibits normal pulse. Cervical back: He exhibits normal range of motion, no tenderness, no swelling, no edema and no deformity. Thoracic back: Normal.        Lumbar back: Normal.   Neurological: He is alert. No cranial nerve deficit. Skin: Skin is warm, dry and intact. No abrasion, no bruising, no ecchymosis and no laceration noted. There is erythema (slight erythema as shown laft lateral chest wall, slight wince with pain with palpation of this area as well). Nursing note and vitals reviewed. Diagnostic Study Results     Labs -   No results found for this or any previous visit (from the past 12 hour(s)).     Radiologic Studies -   XR RIBS LT W PA CXR MIN 3 V    (Results Pending)   XR SPINE CERV PA LAT ODONT 3 V MAX    (Results Pending)     6:07 PM  RADIOLOGY FINDINGS  Ribs X-ray shows NAP  Pending review by Radiologist  Recorded by Bert Mcdaniels ED Scribcharlette, as dictated by Bhumi Allan PA-C    6:07 PM  RADIOLOGY FINDINGS  Spine X-ray shows NAP  Pending review by Radiologist  Recorded by Bert Mcdaniels ED Scribe, as dictated by Ivette Sanford JACKIE Herron    CT Results  (Last 48 hours)    None        CXR Results  (Last 48 hours)    None          Medications given in the ED-  Medications - No data to display      Medical Decision Making   I am the first provider for this patient. I reviewed the vital signs, available nursing notes, past medical history, past surgical history, family history and social history. Vital Signs-Reviewed the patient's vital signs. Pulse Oximetry Analysis - 95% on RA    Cardiac Monitor:  Rate: 55 bpm  Rhythm: NSR    Records Reviewed: Nursing Notes and Old Medical Records    Procedures:  Procedures    ED Course:   4:09 PM Initial assessment performed. The patients presenting problems have been discussed, and they are in agreement with the care plan formulated and outlined with them. I have encouraged them to ask questions as they arise throughout their visit. Diagnosis and Disposition       DISCHARGE NOTE:  6:09 PM  Bonilla Richey  results have been reviewed with him. He has been counseled regarding his diagnosis, treatment, and plan. He verbally conveys understanding and agreement of the signs, symptoms, diagnosis, treatment and prognosis and additionally agrees to follow up as discussed. He also agrees with the care-plan and conveys that all of his questions have been answered. I have also provided discharge instructions for him that include: educational information regarding their diagnosis and treatment, and list of reasons why they would want to return to the ED prior to their follow-up appointment, should his condition change. He has been provided with education for proper emergency department utilization. CLINICAL IMPRESSION:    1. Fall involving lift assistance chair as cause of accidental injury        PLAN:  1. D/C Home  2. Current Discharge Medication List        3.    Follow-up Information     Follow up With Details Comments [address], [place] Schedule an appointment as soon as possible for a visit For primary care follow up [address]      [facility] EMERGENCY DEPT Go to As needed, as symptoms worsen [address] [id number]  [phone]        _______________________________    Attestations: This note is prepared by Luz Palencia, acting as Scribe for Gerhard Don PA-C. Gerhard Don PA-C:  The scribe's documentation has been prepared under my direction and personally reviewed by me in its entirety.   I confirm that the note above accurately reflects all work, treatment, procedures, and medical decision making performed by me.  _______________________________

## 2018-05-06 NOTE — DISCHARGE INSTRUCTIONS

## 2018-05-06 NOTE — ED TRIAGE NOTES
Staff states using [employer] lift, pt fell towards lt side, top part of body on chair,  lt lower part of body on the floor. Staff states he doesn't appear in pain but wanted pt checked for any injuries  Sepsis Screening completed    (  )Patient meets SIRS criteria. ( x )Patient does not meet SIRS criteria.       SIRS Criteria is achieved when two or more of the following are present   Temperature < 96.8°F (36°C) or > 100.9°F (38.3°C)   Heart Rate > 90 beats per minute   Respiratory Rate > 20 breaths per minute   WBC count > 12,000 or <4,000 or > 10% bands

## 2018-08-02 ENCOUNTER — HOSPITAL ENCOUNTER (INPATIENT)
Age: 63
LOS: 5 days | Discharge: HOME | DRG: 871 | End: 2018-08-08
Attending: EMERGENCY MEDICINE | Admitting: EMERGENCY MEDICINE
Payer: MEDICARE

## 2018-08-02 DIAGNOSIS — N39.0 ACUTE UTI: ICD-10-CM

## 2018-08-02 DIAGNOSIS — E87.20 LACTIC ACID ACIDOSIS: ICD-10-CM

## 2018-08-02 DIAGNOSIS — R50.9 FEVER IN ADULT: Primary | ICD-10-CM

## 2018-08-02 DIAGNOSIS — E86.0 MODERATE DEHYDRATION: ICD-10-CM

## 2018-08-02 DIAGNOSIS — L03.116 CELLULITIS OF LEFT LOWER EXTREMITY: ICD-10-CM

## 2018-08-02 DIAGNOSIS — N17.9 AKI (ACUTE KIDNEY INJURY) (HCC): ICD-10-CM

## 2018-08-02 PROCEDURE — 96366 THER/PROPH/DIAG IV INF ADDON: CPT

## 2018-08-02 PROCEDURE — 96367 TX/PROPH/DG ADDL SEQ IV INF: CPT

## 2018-08-02 PROCEDURE — 80053 COMPREHEN METABOLIC PANEL: CPT | Performed by: EMERGENCY MEDICINE

## 2018-08-02 PROCEDURE — 84100 ASSAY OF PHOSPHORUS: CPT | Performed by: EMERGENCY MEDICINE

## 2018-08-02 PROCEDURE — 83605 ASSAY OF LACTIC ACID: CPT | Performed by: EMERGENCY MEDICINE

## 2018-08-02 PROCEDURE — 87186 SC STD MICRODIL/AGAR DIL: CPT | Performed by: EMERGENCY MEDICINE

## 2018-08-02 PROCEDURE — 99285 EMERGENCY DEPT VISIT HI MDM: CPT

## 2018-08-02 PROCEDURE — 87040 BLOOD CULTURE FOR BACTERIA: CPT | Performed by: EMERGENCY MEDICINE

## 2018-08-02 PROCEDURE — 83735 ASSAY OF MAGNESIUM: CPT | Performed by: EMERGENCY MEDICINE

## 2018-08-02 PROCEDURE — 96365 THER/PROPH/DIAG IV INF INIT: CPT

## 2018-08-02 PROCEDURE — 96375 TX/PRO/DX INJ NEW DRUG ADDON: CPT

## 2018-08-02 PROCEDURE — 85025 COMPLETE CBC W/AUTO DIFF WBC: CPT | Performed by: EMERGENCY MEDICINE

## 2018-08-02 PROCEDURE — 87077 CULTURE AEROBIC IDENTIFY: CPT | Performed by: EMERGENCY MEDICINE

## 2018-08-02 PROCEDURE — 51702 INSERT TEMP BLADDER CATH: CPT

## 2018-08-02 PROCEDURE — 96361 HYDRATE IV INFUSION ADD-ON: CPT

## 2018-08-02 PROCEDURE — 75810000137 HC PLCMT CENT VENOUS CATH

## 2018-08-02 RX ORDER — ETHYL ALCOHOL 70 %
SOLUTION, NON-ORAL TOPICAL AS NEEDED
COMMUNITY

## 2018-08-02 RX ORDER — CHLORPHENIRAMINE MALEATE 4 MG
TABLET ORAL 2 TIMES DAILY
COMMUNITY

## 2018-08-02 RX ORDER — BETAMETHASONE VALERATE 1.2 MG/G
OINTMENT TOPICAL 2 TIMES DAILY
COMMUNITY

## 2018-08-02 RX ORDER — CLINDAMYCIN PHOSPHATE 10 MG/G
GEL TOPICAL 2 TIMES DAILY
COMMUNITY

## 2018-08-02 RX ORDER — KETOCONAZOLE 20 MG/ML
SHAMPOO TOPICAL DAILY PRN
COMMUNITY

## 2018-08-02 NOTE — Clinical Note
Status[de-identified] Inpatient [101] Type of Bed: Telemetry [19] Inpatient Hospitalization Certified Necessary for the Following Reasons: 3. Patient receiving treatment that can only be provided in an inpatient setting (further clarification in H&P documentation) Admitting Diagnosis: Sepsis ([facility] [[id number]] Admitting Diagnosis: Acute UTI [[id number]] Admitting Diagnosis: TRANG (acute kidney injury) ([facility] [[id number]] Admitting Physician: Alessandro Davidson [[id number]] Attending Physician: Alessandro Davidson [[id number]] Estimated Length of Stay: 2 Midnights Discharge Plan[de-identified] Home with Office Follow-up

## 2018-08-03 ENCOUNTER — APPOINTMENT (OUTPATIENT)
Dept: CT IMAGING | Age: 63
DRG: 871 | End: 2018-08-03
Attending: INTERNAL MEDICINE
Payer: MEDICARE

## 2018-08-03 ENCOUNTER — APPOINTMENT (OUTPATIENT)
Dept: GENERAL RADIOLOGY | Age: 63
DRG: 871 | End: 2018-08-03
Attending: INTERNAL MEDICINE
Payer: MEDICARE

## 2018-08-03 ENCOUNTER — APPOINTMENT (OUTPATIENT)
Dept: NON INVASIVE DIAGNOSTICS | Age: 63
DRG: 871 | End: 2018-08-03
Attending: INTERNAL MEDICINE
Payer: MEDICARE

## 2018-08-03 ENCOUNTER — APPOINTMENT (OUTPATIENT)
Dept: GENERAL RADIOLOGY | Age: 63
DRG: 871 | End: 2018-08-03
Attending: EMERGENCY MEDICINE
Payer: MEDICARE

## 2018-08-03 PROBLEM — E87.20 LACTIC ACID ACIDOSIS: Status: RESOLVED | Noted: 2017-06-28 | Resolved: 2018-08-03

## 2018-08-03 PROBLEM — L03.90 CELLULITIS: Status: RESOLVED | Noted: 2017-06-28 | Resolved: 2018-08-03

## 2018-08-03 PROBLEM — N17.9 AKI (ACUTE KIDNEY INJURY) (HCC): Status: ACTIVE | Noted: 2018-08-03

## 2018-08-03 PROBLEM — N39.0 ACUTE UTI: Status: ACTIVE | Noted: 2018-08-03

## 2018-08-03 PROBLEM — A41.9 SEPSIS (HCC): Status: ACTIVE | Noted: 2018-08-03

## 2018-08-03 LAB
ALBUMIN SERPL-MCNC: 1.9 G/DL (ref 3.4–5)
ALBUMIN SERPL-MCNC: 2.5 G/DL (ref 3.4–5)
ALBUMIN/GLOB SERPL: 0.5 {RATIO} (ref 0.8–1.7)
ALBUMIN/GLOB SERPL: 0.5 {RATIO} (ref 0.8–1.7)
ALP SERPL-CCNC: 133 U/L (ref 45–117)
ALP SERPL-CCNC: 82 U/L (ref 45–117)
ALT SERPL-CCNC: 30 U/L (ref 16–61)
ALT SERPL-CCNC: 43 U/L (ref 16–61)
ANION GAP SERPL CALC-SCNC: 8 MMOL/L (ref 3–18)
ANION GAP SERPL CALC-SCNC: 8 MMOL/L (ref 3–18)
APPEARANCE UR: ABNORMAL
APTT PPP: 32.1 SEC (ref 23–36.4)
ARTERIAL PATENCY WRIST A: ABNORMAL
AST SERPL-CCNC: 21 U/L (ref 15–37)
AST SERPL-CCNC: 29 U/L (ref 15–37)
BACTERIA URNS QL MICRO: ABNORMAL /HPF
BASE DEFICIT BLDV-SCNC: 6 MMOL/L
BASOPHILS # BLD: 0 K/UL (ref 0–0.1)
BASOPHILS NFR BLD: 0 % (ref 0–3)
BDY SITE: ABNORMAL
BILIRUB SERPL-MCNC: 0.4 MG/DL (ref 0.2–1)
BILIRUB SERPL-MCNC: 0.6 MG/DL (ref 0.2–1)
BILIRUB UR QL: NEGATIVE
BODY TEMPERATURE: 98.6
BUN SERPL-MCNC: 15 MG/DL (ref 7–18)
BUN SERPL-MCNC: 20 MG/DL (ref 7–18)
BUN/CREAT SERPL: 10 (ref 12–20)
BUN/CREAT SERPL: 14 (ref 12–20)
CALCIUM SERPL-MCNC: 7 MG/DL (ref 8.5–10.1)
CALCIUM SERPL-MCNC: 8.2 MG/DL (ref 8.5–10.1)
CHLORIDE SERPL-SCNC: 106 MMOL/L (ref 100–108)
CHLORIDE SERPL-SCNC: 111 MMOL/L (ref 100–108)
CO2 SERPL-SCNC: 23 MMOL/L (ref 21–32)
CO2 SERPL-SCNC: 25 MMOL/L (ref 21–32)
COLOR UR: YELLOW
CREAT SERPL-MCNC: 1.41 MG/DL (ref 0.6–1.3)
CREAT SERPL-MCNC: 1.44 MG/DL (ref 0.6–1.3)
DIFFERENTIAL METHOD BLD: ABNORMAL
ECHO AO ASC DIAM: 3.18 CM
ECHO AO ROOT DIAM: 3.1 CM
ECHO AV AREA PEAK VELOCITY: 1.6 CM2
ECHO AV AREA VTI: 1.5 CM2
ECHO AV AREA/BSA PEAK VELOCITY: 0.8 CM2/M2
ECHO AV AREA/BSA VTI: 0.8 CM2/M2
ECHO AV MEAN GRADIENT: 3.4 MMHG
ECHO AV PEAK GRADIENT: 5.8 MMHG
ECHO AV PEAK VELOCITY: 120.71 CM/S
ECHO AV VTI: 23.77 CM
ECHO IVC SNIFF: 2.05 CM
ECHO LA MAJOR AXIS: 3.07 CM
ECHO LV E' LATERAL VELOCITY: 1 CM/S
ECHO LV E' SEPTAL VELOCITY: 0.9 CM/S
ECHO LV EDV A2C: 41.1 ML
ECHO LV EDV A4C: 47 ML
ECHO LV EDV BP: 44.8 ML (ref 67–155)
ECHO LV EDV INDEX A4C: 25.7 ML/M2
ECHO LV EDV INDEX BP: 24.5 ML/M2
ECHO LV EDV NDEX A2C: 22.4 ML/M2
ECHO LV EJECTION FRACTION A2C: 62 %
ECHO LV EJECTION FRACTION A4C: 79 %
ECHO LV EJECTION FRACTION BIPLANE: 72.7 % (ref 55–100)
ECHO LV ESV A2C: 15.6 ML
ECHO LV ESV A4C: 10 ML
ECHO LV ESV BP: 12.2 ML (ref 22–58)
ECHO LV ESV INDEX A2C: 8.5 ML/M2
ECHO LV ESV INDEX A4C: 5.5 ML/M2
ECHO LV ESV INDEX BP: 6.7 ML/M2
ECHO LV INTERNAL DIMENSION DIASTOLIC: 3.61 CM (ref 4.2–5.9)
ECHO LV INTERNAL DIMENSION SYSTOLIC: 2.45 CM
ECHO LV IVSD: 1.03 CM (ref 0.6–1)
ECHO LV MASS 2D: 129 G (ref 88–224)
ECHO LV MASS INDEX 2D: 70.4 G/M2
ECHO LV POSTERIOR WALL DIASTOLIC: 1.04 CM (ref 0.6–1)
ECHO LVOT DIAM: 1.87 CM
ECHO LVOT PEAK GRADIENT: 2 MMHG
ECHO LVOT PEAK VELOCITY: 69.83 CM/S
ECHO LVOT VTI: 12.95 CM
ECHO MV A VELOCITY: 74.43 CM/S
ECHO MV AREA PHT: 3.4 CM2
ECHO MV E DECELERATION TIME (DT): 223.9 MS
ECHO MV E VELOCITY: 0.82 CM/S
ECHO MV E/A RATIO: 0.01
ECHO MV E/E' LATERAL: 0.82
ECHO MV E/E' RATIO (AVERAGED): 0.87
ECHO MV E/E' SEPTAL: 0.91
ECHO MV PRESSURE HALF TIME (PHT): 64.9 MS
ECHO RV TAPSE: 2.7 CM (ref 1.5–2)
EOSINOPHIL # BLD: 0.1 K/UL (ref 0–0.4)
EOSINOPHIL NFR BLD: 1 % (ref 0–5)
EPITH CASTS URNS QL MICRO: ABNORMAL /LPF (ref 0–5)
ERYTHROCYTE [DISTWIDTH] IN BLOOD BY AUTOMATED COUNT: 13.5 % (ref 11.6–14.5)
ERYTHROCYTE [DISTWIDTH] IN BLOOD BY AUTOMATED COUNT: 13.9 % (ref 11.6–14.5)
EST. AVERAGE GLUCOSE BLD GHB EST-MCNC: 97 MG/DL
GAS FLOW.O2 O2 DELIVERY SYS: ABNORMAL L/MIN
GLOBULIN SER CALC-MCNC: 4 G/DL (ref 2–4)
GLOBULIN SER CALC-MCNC: 4.6 G/DL (ref 2–4)
GLUCOSE BLD STRIP.AUTO-MCNC: 128 MG/DL (ref 70–110)
GLUCOSE BLD STRIP.AUTO-MCNC: 219 MG/DL (ref 70–110)
GLUCOSE SERPL-MCNC: 119 MG/DL (ref 74–99)
GLUCOSE SERPL-MCNC: 124 MG/DL (ref 74–99)
GLUCOSE UR STRIP.AUTO-MCNC: NEGATIVE MG/DL
HBA1C MFR BLD: 5 % (ref 4.5–5.6)
HCO3 BLDV-SCNC: 20.1 MMOL/L (ref 23–28)
HCT VFR BLD AUTO: 33.9 % (ref 36–48)
HCT VFR BLD AUTO: 39.9 % (ref 36–48)
HGB BLD-MCNC: 10.9 G/DL (ref 13–16)
HGB BLD-MCNC: 13.2 G/DL (ref 13–16)
HGB UR QL STRIP: ABNORMAL
INR PPP: 1.3 (ref 0.8–1.2)
KETONES UR QL STRIP.AUTO: NEGATIVE MG/DL
LACTATE SERPL-SCNC: 1.5 MMOL/L (ref 0.4–2)
LACTATE SERPL-SCNC: 3 MMOL/L (ref 0.4–2)
LEUKOCYTE ESTERASE UR QL STRIP.AUTO: ABNORMAL
LIPASE SERPL-CCNC: 99 U/L (ref 73–393)
LYMPHOCYTES # BLD: 0.9 K/UL (ref 0.8–3.5)
LYMPHOCYTES NFR BLD: 15 % (ref 20–51)
MAGNESIUM SERPL-MCNC: 1.6 MG/DL (ref 1.6–2.6)
MAGNESIUM SERPL-MCNC: 1.7 MG/DL (ref 1.6–2.6)
MAGNESIUM SERPL-MCNC: 2.6 MG/DL (ref 1.6–2.6)
MCH RBC QN AUTO: 33 PG (ref 24–34)
MCH RBC QN AUTO: 33.3 PG (ref 24–34)
MCHC RBC AUTO-ENTMCNC: 32.2 G/DL (ref 31–37)
MCHC RBC AUTO-ENTMCNC: 33.1 G/DL (ref 31–37)
MCV RBC AUTO: 100.8 FL (ref 74–97)
MCV RBC AUTO: 102.7 FL (ref 74–97)
MONOCYTES # BLD: 0.2 K/UL (ref 0–1)
MONOCYTES NFR BLD: 4 % (ref 2–9)
MUCOUS THREADS URNS QL MICRO: NEGATIVE /LPF
NEUTS BAND NFR BLD MANUAL: 5 % (ref 0–5)
NEUTS SEG # BLD: 4.5 K/UL (ref 1.8–8)
NEUTS SEG NFR BLD: 75 % (ref 42–75)
NITRITE UR QL STRIP.AUTO: POSITIVE
O2/TOTAL GAS SETTING VFR VENT: 21 %
PCO2 BLDV: 39.8 MMHG (ref 41–51)
PH BLDV: 7.31 [PH] (ref 7.32–7.42)
PH UR STRIP: 6.5 [PH] (ref 5–8)
PHOSPHATE SERPL-MCNC: 1.3 MG/DL (ref 2.5–4.9)
PHOSPHATE SERPL-MCNC: 2.1 MG/DL (ref 2.5–4.9)
PLATELET # BLD AUTO: 135 K/UL (ref 135–420)
PLATELET # BLD AUTO: 161 K/UL (ref 135–420)
PMV BLD AUTO: 10.7 FL (ref 9.2–11.8)
PMV BLD AUTO: 11.1 FL (ref 9.2–11.8)
PO2 BLDV: 31 MMHG (ref 25–40)
POTASSIUM SERPL-SCNC: 3.7 MMOL/L (ref 3.5–5.5)
POTASSIUM SERPL-SCNC: 3.9 MMOL/L (ref 3.5–5.5)
PROT SERPL-MCNC: 5.9 G/DL (ref 6.4–8.2)
PROT SERPL-MCNC: 7.1 G/DL (ref 6.4–8.2)
PROT UR STRIP-MCNC: 30 MG/DL
PROTHROMBIN TIME: 15.9 SEC (ref 11.5–15.2)
RBC # BLD AUTO: 3.3 M/UL (ref 4.7–5.5)
RBC # BLD AUTO: 3.96 M/UL (ref 4.7–5.5)
RBC #/AREA URNS HPF: ABNORMAL /HPF (ref 0–5)
RBC MORPH BLD: ABNORMAL
RBC MORPH BLD: ABNORMAL
SAO2 % BLDV: 54 % (ref 65–88)
SERVICE CMNT-IMP: ABNORMAL
SODIUM SERPL-SCNC: 139 MMOL/L (ref 136–145)
SODIUM SERPL-SCNC: 142 MMOL/L (ref 136–145)
SP GR UR REFRACTOMETRY: 1.02 (ref 1–1.03)
SPECIMEN TYPE: ABNORMAL
TOTAL RESP. RATE, ITRR: 25
TSH SERPL DL<=0.05 MIU/L-ACNC: 3.2 UIU/ML (ref 0.36–3.74)
UROBILINOGEN UR QL STRIP.AUTO: 1 EU/DL (ref 0.2–1)
WBC # BLD AUTO: 6 K/UL (ref 4.6–13.2)
WBC # BLD AUTO: 9.5 K/UL (ref 4.6–13.2)
WBC URNS QL MICRO: ABNORMAL /HPF (ref 0–5)

## 2018-08-03 PROCEDURE — 92526 ORAL FUNCTION THERAPY: CPT

## 2018-08-03 PROCEDURE — 94640 AIRWAY INHALATION TREATMENT: CPT

## 2018-08-03 PROCEDURE — 74011250636 HC RX REV CODE- 250/636: Performed by: HOSPITALIST

## 2018-08-03 PROCEDURE — A4216 STERILE WATER/SALINE, 10 ML: HCPCS | Performed by: HOSPITALIST

## 2018-08-03 PROCEDURE — 74011000255 HC RX REV CODE- 255: Performed by: EMERGENCY MEDICINE

## 2018-08-03 PROCEDURE — 74011250636 HC RX REV CODE- 250/636: Performed by: INTERNAL MEDICINE

## 2018-08-03 PROCEDURE — 74011250636 HC RX REV CODE- 250/636: Performed by: EMERGENCY MEDICINE

## 2018-08-03 PROCEDURE — 83735 ASSAY OF MAGNESIUM: CPT | Performed by: EMERGENCY MEDICINE

## 2018-08-03 PROCEDURE — 84443 ASSAY THYROID STIM HORMONE: CPT | Performed by: INTERNAL MEDICINE

## 2018-08-03 PROCEDURE — 74011000250 HC RX REV CODE- 250: Performed by: INTERNAL MEDICINE

## 2018-08-03 PROCEDURE — 74011250637 HC RX REV CODE- 250/637: Performed by: HOSPITALIST

## 2018-08-03 PROCEDURE — 85027 COMPLETE CBC AUTOMATED: CPT | Performed by: INTERNAL MEDICINE

## 2018-08-03 PROCEDURE — 82803 BLOOD GASES ANY COMBINATION: CPT

## 2018-08-03 PROCEDURE — A4216 STERILE WATER/SALINE, 10 ML: HCPCS | Performed by: EMERGENCY MEDICINE

## 2018-08-03 PROCEDURE — 74011250637 HC RX REV CODE- 250/637: Performed by: INTERNAL MEDICINE

## 2018-08-03 PROCEDURE — 83690 ASSAY OF LIPASE: CPT | Performed by: INTERNAL MEDICINE

## 2018-08-03 PROCEDURE — 85610 PROTHROMBIN TIME: CPT | Performed by: INTERNAL MEDICINE

## 2018-08-03 PROCEDURE — 92611 MOTION FLUOROSCOPY/SWALLOW: CPT

## 2018-08-03 PROCEDURE — 84100 ASSAY OF PHOSPHORUS: CPT | Performed by: INTERNAL MEDICINE

## 2018-08-03 PROCEDURE — 85730 THROMBOPLASTIN TIME PARTIAL: CPT | Performed by: INTERNAL MEDICINE

## 2018-08-03 PROCEDURE — A4216 STERILE WATER/SALINE, 10 ML: HCPCS | Performed by: INTERNAL MEDICINE

## 2018-08-03 PROCEDURE — 76450000000

## 2018-08-03 PROCEDURE — 71045 X-RAY EXAM CHEST 1 VIEW: CPT

## 2018-08-03 PROCEDURE — 83735 ASSAY OF MAGNESIUM: CPT | Performed by: INTERNAL MEDICINE

## 2018-08-03 PROCEDURE — 74011000258 HC RX REV CODE- 258: Performed by: EMERGENCY MEDICINE

## 2018-08-03 PROCEDURE — 83605 ASSAY OF LACTIC ACID: CPT | Performed by: EMERGENCY MEDICINE

## 2018-08-03 PROCEDURE — 87252 VIRUS INOCULATION TISSUE: CPT | Performed by: EMERGENCY MEDICINE

## 2018-08-03 PROCEDURE — 80053 COMPREHEN METABOLIC PANEL: CPT | Performed by: INTERNAL MEDICINE

## 2018-08-03 PROCEDURE — 92610 EVALUATE SWALLOWING FUNCTION: CPT

## 2018-08-03 PROCEDURE — 87186 SC STD MICRODIL/AGAR DIL: CPT | Performed by: EMERGENCY MEDICINE

## 2018-08-03 PROCEDURE — 74230 X-RAY XM SWLNG FUNCJ C+: CPT

## 2018-08-03 PROCEDURE — 74011000250 HC RX REV CODE- 250: Performed by: HOSPITALIST

## 2018-08-03 PROCEDURE — C8929 TTE W OR WO FOL WCON,DOPPLER: HCPCS

## 2018-08-03 PROCEDURE — 82962 GLUCOSE BLOOD TEST: CPT

## 2018-08-03 PROCEDURE — 74176 CT ABD & PELVIS W/O CONTRAST: CPT

## 2018-08-03 PROCEDURE — 65660000000 HC RM CCU STEPDOWN

## 2018-08-03 PROCEDURE — C1751 CATH, INF, PER/CENT/MIDLINE: HCPCS

## 2018-08-03 PROCEDURE — 87077 CULTURE AEROBIC IDENTIFY: CPT | Performed by: EMERGENCY MEDICINE

## 2018-08-03 PROCEDURE — 74011636637 HC RX REV CODE- 636/637: Performed by: INTERNAL MEDICINE

## 2018-08-03 PROCEDURE — 81001 URINALYSIS AUTO W/SCOPE: CPT | Performed by: EMERGENCY MEDICINE

## 2018-08-03 PROCEDURE — 77030034849

## 2018-08-03 PROCEDURE — 83036 HEMOGLOBIN GLYCOSYLATED A1C: CPT | Performed by: INTERNAL MEDICINE

## 2018-08-03 PROCEDURE — 87086 URINE CULTURE/COLONY COUNT: CPT | Performed by: EMERGENCY MEDICINE

## 2018-08-03 RX ORDER — NOREPINEPHRINE BITARTRATE/D5W 8 MG/250ML
2-16 PLASTIC BAG, INJECTION (ML) INTRAVENOUS
Status: DISCONTINUED | OUTPATIENT
Start: 2018-08-03 | End: 2018-08-03 | Stop reason: SDUPTHER

## 2018-08-03 RX ORDER — LEVOFLOXACIN 5 MG/ML
750 INJECTION, SOLUTION INTRAVENOUS EVERY 24 HOURS
Status: DISCONTINUED | OUTPATIENT
Start: 2018-08-03 | End: 2018-08-03

## 2018-08-03 RX ORDER — VANCOMYCIN HYDROCHLORIDE
1250 EVERY 24 HOURS
Status: DISCONTINUED | OUTPATIENT
Start: 2018-08-04 | End: 2018-08-05

## 2018-08-03 RX ORDER — LOPERAMIDE HYDROCHLORIDE 2 MG/1
2 CAPSULE ORAL
Status: DISCONTINUED | OUTPATIENT
Start: 2018-08-03 | End: 2018-08-08 | Stop reason: HOSPADM

## 2018-08-03 RX ORDER — NOREPINEPHRINE BITARTRATE/D5W 8 MG/250ML
2-16 PLASTIC BAG, INJECTION (ML) INTRAVENOUS
Status: DISCONTINUED | OUTPATIENT
Start: 2018-08-03 | End: 2018-08-05

## 2018-08-03 RX ORDER — ENOXAPARIN SODIUM 100 MG/ML
40 INJECTION SUBCUTANEOUS EVERY 24 HOURS
Status: DISCONTINUED | OUTPATIENT
Start: 2018-08-03 | End: 2018-08-08 | Stop reason: HOSPADM

## 2018-08-03 RX ORDER — SODIUM CHLORIDE 9 MG/ML
25 INJECTION, SOLUTION INTRAVENOUS CONTINUOUS
Status: DISCONTINUED | OUTPATIENT
Start: 2018-08-03 | End: 2018-08-06

## 2018-08-03 RX ORDER — DEXTROSE 50 % IN WATER (D50W) INTRAVENOUS SYRINGE
25-50 AS NEEDED
Status: DISCONTINUED | OUTPATIENT
Start: 2018-08-03 | End: 2018-08-08 | Stop reason: HOSPADM

## 2018-08-03 RX ORDER — VANCOMYCIN HYDROCHLORIDE
1250 ONCE
Status: COMPLETED | OUTPATIENT
Start: 2018-08-03 | End: 2018-08-04

## 2018-08-03 RX ORDER — OLANZAPINE 2.5 MG/1
2.5 TABLET ORAL EVERY EVENING
Status: DISCONTINUED | OUTPATIENT
Start: 2018-08-03 | End: 2018-08-08 | Stop reason: HOSPADM

## 2018-08-03 RX ORDER — POTASSIUM CHLORIDE 7.45 MG/ML
10 INJECTION INTRAVENOUS ONCE
Status: COMPLETED | OUTPATIENT
Start: 2018-08-03 | End: 2018-08-04

## 2018-08-03 RX ORDER — IPRATROPIUM BROMIDE AND ALBUTEROL SULFATE 2.5; .5 MG/3ML; MG/3ML
3 SOLUTION RESPIRATORY (INHALATION)
Status: DISCONTINUED | OUTPATIENT
Start: 2018-08-03 | End: 2018-08-08 | Stop reason: HOSPADM

## 2018-08-03 RX ORDER — MAGNESIUM SULFATE 100 %
4 CRYSTALS MISCELLANEOUS AS NEEDED
Status: DISCONTINUED | OUTPATIENT
Start: 2018-08-03 | End: 2018-08-08 | Stop reason: HOSPADM

## 2018-08-03 RX ORDER — DIVALPROEX SODIUM 125 MG/1
250 TABLET, DELAYED RELEASE ORAL EVERY 12 HOURS
Status: DISCONTINUED | OUTPATIENT
Start: 2018-08-03 | End: 2018-08-04

## 2018-08-03 RX ORDER — MAGNESIUM SULFATE HEPTAHYDRATE 40 MG/ML
2 INJECTION, SOLUTION INTRAVENOUS ONCE
Status: COMPLETED | OUTPATIENT
Start: 2018-08-03 | End: 2018-08-04

## 2018-08-03 RX ORDER — SODIUM CHLORIDE 0.9 % (FLUSH) 0.9 %
5-10 SYRINGE (ML) INJECTION AS NEEDED
Status: DISCONTINUED | OUTPATIENT
Start: 2018-08-03 | End: 2018-08-08 | Stop reason: HOSPADM

## 2018-08-03 RX ORDER — ACETAMINOPHEN 325 MG/1
650 TABLET ORAL
Status: DISCONTINUED | OUTPATIENT
Start: 2018-08-03 | End: 2018-08-03 | Stop reason: SDUPTHER

## 2018-08-03 RX ORDER — CITALOPRAM 20 MG/1
20 TABLET, FILM COATED ORAL DAILY
Status: DISCONTINUED | OUTPATIENT
Start: 2018-08-04 | End: 2018-08-08 | Stop reason: HOSPADM

## 2018-08-03 RX ORDER — ONDANSETRON 2 MG/ML
4 INJECTION INTRAMUSCULAR; INTRAVENOUS
Status: DISCONTINUED | OUTPATIENT
Start: 2018-08-03 | End: 2018-08-08 | Stop reason: HOSPADM

## 2018-08-03 RX ORDER — IBUPROFEN 400 MG/1
400 TABLET ORAL
Status: COMPLETED | OUTPATIENT
Start: 2018-08-03 | End: 2018-08-03

## 2018-08-03 RX ORDER — INSULIN LISPRO 100 [IU]/ML
INJECTION, SOLUTION INTRAVENOUS; SUBCUTANEOUS
Status: DISCONTINUED | OUTPATIENT
Start: 2018-08-03 | End: 2018-08-08 | Stop reason: HOSPADM

## 2018-08-03 RX ORDER — DIVALPROEX SODIUM 125 MG/1
250 CAPSULE, COATED PELLETS ORAL 2 TIMES DAILY
Status: DISCONTINUED | OUTPATIENT
Start: 2018-08-03 | End: 2018-08-08 | Stop reason: HOSPADM

## 2018-08-03 RX ORDER — PANTOPRAZOLE SODIUM 40 MG/1
40 TABLET, DELAYED RELEASE ORAL
Status: DISCONTINUED | OUTPATIENT
Start: 2018-08-03 | End: 2018-08-03

## 2018-08-03 RX ORDER — ACETAMINOPHEN 325 MG/1
650 TABLET ORAL
Status: DISCONTINUED | OUTPATIENT
Start: 2018-08-03 | End: 2018-08-08 | Stop reason: HOSPADM

## 2018-08-03 RX ORDER — RISPERIDONE 1 MG/1
1 TABLET, FILM COATED ORAL
Status: DISCONTINUED | OUTPATIENT
Start: 2018-08-03 | End: 2018-08-04

## 2018-08-03 RX ORDER — PANTOPRAZOLE SODIUM 40 MG/1
40 TABLET, DELAYED RELEASE ORAL 2 TIMES DAILY
Status: DISCONTINUED | OUTPATIENT
Start: 2018-08-03 | End: 2018-08-04

## 2018-08-03 RX ADMIN — SODIUM CHLORIDE 125 ML/HR: 900 INJECTION, SOLUTION INTRAVENOUS at 20:07

## 2018-08-03 RX ADMIN — MEROPENEM 500 MG: 500 INJECTION, POWDER, FOR SOLUTION INTRAVENOUS at 23:29

## 2018-08-03 RX ADMIN — SODIUM CHLORIDE 125 ML/HR: 900 INJECTION, SOLUTION INTRAVENOUS at 12:16

## 2018-08-03 RX ADMIN — VANCOMYCIN HYDROCHLORIDE 1250 MG: 10 INJECTION, POWDER, LYOPHILIZED, FOR SOLUTION INTRAVENOUS at 05:10

## 2018-08-03 RX ADMIN — IPRATROPIUM BROMIDE AND ALBUTEROL SULFATE 3 ML: .5; 3 SOLUTION RESPIRATORY (INHALATION) at 15:28

## 2018-08-03 RX ADMIN — PERFLUTREN 1 ML: 6.52 INJECTION, SUSPENSION INTRAVENOUS at 16:08

## 2018-08-03 RX ADMIN — BARIUM SULFATE 20 ML: 400 PASTE ORAL at 11:48

## 2018-08-03 RX ADMIN — SODIUM CHLORIDE 150 ML/HR: 900 INJECTION, SOLUTION INTRAVENOUS at 05:38

## 2018-08-03 RX ADMIN — PANTOPRAZOLE SODIUM 40 MG: 40 TABLET, DELAYED RELEASE ORAL at 21:15

## 2018-08-03 RX ADMIN — RISPERIDONE 1 MG: 1 TABLET, FILM COATED ORAL at 21:15

## 2018-08-03 RX ADMIN — DIVALPROEX SODIUM 250 MG: 125 TABLET, DELAYED RELEASE ORAL at 15:52

## 2018-08-03 RX ADMIN — SODIUM CHLORIDE 1000 ML: 900 INJECTION, SOLUTION INTRAVENOUS at 04:09

## 2018-08-03 RX ADMIN — INSULIN LISPRO 4 UNITS: 100 INJECTION, SOLUTION INTRAVENOUS; SUBCUTANEOUS at 21:31

## 2018-08-03 RX ADMIN — DIVALPROEX SODIUM 250 MG: 125 TABLET, DELAYED RELEASE ORAL at 21:15

## 2018-08-03 RX ADMIN — SODIUM CHLORIDE 469 ML: 900 INJECTION, SOLUTION INTRAVENOUS at 05:09

## 2018-08-03 RX ADMIN — BARIUM SULFATE 20 G: 960 POWDER, FOR SUSPENSION ORAL at 11:48

## 2018-08-03 RX ADMIN — ACETAMINOPHEN 650 MG: 325 TABLET, FILM COATED ORAL at 10:18

## 2018-08-03 RX ADMIN — PIPERACILLIN SODIUM,TAZOBACTAM SODIUM 3.38 G: 3; .375 INJECTION, POWDER, FOR SOLUTION INTRAVENOUS at 04:15

## 2018-08-03 RX ADMIN — POTASSIUM PHOSPHATE, MONOBASIC AND POTASSIUM PHOSPHATE, DIBASIC: 224; 236 INJECTION, SOLUTION INTRAVENOUS at 08:12

## 2018-08-03 RX ADMIN — MEROPENEM 500 MG: 500 INJECTION, POWDER, FOR SOLUTION INTRAVENOUS at 15:52

## 2018-08-03 RX ADMIN — WATER 2 G: 1 INJECTION INTRAMUSCULAR; INTRAVENOUS; SUBCUTANEOUS at 07:44

## 2018-08-03 RX ADMIN — IBUPROFEN 400 MG: 400 TABLET ORAL at 12:11

## 2018-08-03 RX ADMIN — ENOXAPARIN SODIUM 40 MG: 40 INJECTION, SOLUTION INTRAVENOUS; SUBCUTANEOUS at 07:44

## 2018-08-03 RX ADMIN — SODIUM CHLORIDE 1000 ML: 900 INJECTION, SOLUTION INTRAVENOUS at 01:29

## 2018-08-03 RX ADMIN — SODIUM CHLORIDE 1000 ML: 900 INJECTION, SOLUTION INTRAVENOUS at 08:13

## 2018-08-03 RX ADMIN — NOREPINEPHRINE BITARTRATE 4 MCG/MIN: 8 SOLUTION at 16:31

## 2018-08-03 RX ADMIN — OLANZAPINE 2.5 MG: 2.5 TABLET, FILM COATED ORAL at 18:04

## 2018-08-03 RX ADMIN — WATER 1 G: 1 INJECTION INTRAMUSCULAR; INTRAVENOUS; SUBCUTANEOUS at 01:39

## 2018-08-03 RX ADMIN — MAGNESIUM SULFATE HEPTAHYDRATE 2 G: 40 INJECTION, SOLUTION INTRAVENOUS at 18:03

## 2018-08-03 RX ADMIN — IPRATROPIUM BROMIDE AND ALBUTEROL SULFATE 3 ML: .5; 3 SOLUTION RESPIRATORY (INHALATION) at 19:37

## 2018-08-03 RX ADMIN — BARIUM SULFATE 40 ML: 400 SUSPENSION ORAL at 11:48

## 2018-08-03 RX ADMIN — POTASSIUM CHLORIDE 10 MEQ: 10 INJECTION, SOLUTION INTRAVENOUS at 18:02

## 2018-08-03 RX ADMIN — LEVOFLOXACIN 750 MG: 5 INJECTION, SOLUTION INTRAVENOUS at 07:52

## 2018-08-03 NOTE — ED PROVIDER NOTES
EMERGENCY DEPARTMENT HISTORY AND PHYSICAL EXAM 
 
Date: 8/2/2018 Patient Name: Florence Tamayo History of Presenting Illness Chief Complaint Patient presents with  Fever  Vomiting History Provided By: Caregiver Chief Complaint: Fever Duration: Tonight Timing:  Acute Location: Generalized Quality: Febrile Severity: 101.9 F in ED Modifying Factors: Mild relief with Tylenol en route Associated Symptoms: Cough and vomiting Additional History (Context):  
12:12 AM  
Florence Tamayo is a 65 y.o. male with PMHX of Down's syndrome, Alzheimer's dementia, and seizure who presents to the emergency department via EMS from [place] [address] an acute generalized fever (101.9 F in ED), onset tonight. Pt had a temperature of 103 F earlier tonight at the assisted living home. Associated sxs include cough and vomiting x2. Mild relief with Tylenol 650 mg x2 via EMS en route (2 doses due to vomiting). Caregiver states that he has been eating and drinking normally. Caregiver denies EtOH/tobacco/illicit drug use. Pt is a former tobacco user. HPI and ROS limited due to patient non-verbal. Per caregiver, pt is usually able to follow commands. PCP: Bryce Duron DO 
 
Current Facility-Administered Medications Medication Dose Route Frequency Provider Last Rate Last Dose  sodium chloride (NS) flush 5-10 mL  5-10 mL IntraVENous PRN Avelino Christianson MD      
 Vancomycin- Pharmacy to dose  1 Each Other Rx Dosing/Monitoring Avelino Christianson MD      
 [START ON 8/4/2018] vancomycin (VANCOCIN) 1250 mg in  ml infusion  1,250 mg IntraVENous Q24H Avelino Christianson MD      
 0.9% sodium chloride infusion  125 mL/hr IntraVENous CONTINUOUS Tamara Sheppard  mL/hr at 08/03/18 5006 125 mL/hr at 08/03/18 7337  enoxaparin (LOVENOX) injection 40 mg  40 mg SubCUTAneous Q24H Tamara Sheppard MD   40 mg at 08/03/18 1353  ondansetron (ZOFRAN) injection 4 mg  4 mg IntraVENous Q6H PRN Geovanna Hein Abigail Buckner MD      
 acetaminophen (TYLENOL) tablet 650 mg  650 mg Oral Q6H PRN Dali Frye MD      
 sodium chloride 0.9 % bolus infusion 1,000 mL  1,000 mL IntraVENous ONCE Dali Frye MD      
 cefepime (MAXIPIME) 2 g in sterile water (preservative free) 10 mL IV syringe  2 g IntraVENous Q8H Dali Frye MD   2 g at 08/03/18 4146  levoFLOXacin (LEVAQUIN) 750 mg in D5W IVPB  750 mg IntraVENous Q24H Dali Frye  mL/hr at 08/03/18 0752 750 mg at 08/03/18 9579  potassium phosphate 20 mmol in 0.9% sodium chloride 250 mL infusion   IntraVENous ONCE Dali Frye MD      
 NOREPINephrine (LEVOPHED) 8 mg in 5% dextrose 250mL infusion  2-16 mcg/min IntraVENous TITRATE Dali Frye MD      
 
Current Outpatient Prescriptions Medication Sig Dispense Refill  betamethasone valerate (VALISONE) 0.1 % ointment Apply  to affected area two (2) times a day.  clindamycin (CLINDAGEL) 1 % topical gel Apply  to affected area two (2) times a day. use thin film on affected area  mineral oil-isopropyl myristat (EUCERIN) lotion Apply  to affected area as needed for Dry Skin.  ketoconazole (NIZORAL) 2 % shampoo Apply  to affected area daily as needed for Itching.  pneumococcal 13 john conj dip (PREVNAR 13, PF,) 0.5 mL syrg injection 0.5 mL by IntraMUSCular route PRIOR TO DISCHARGE.  clotrimazole (LOTRIMIN) 1 % topical cream Apply  to affected area two (2) times a day.  vitamin a & d (A&D) ointment Apply  to affected area as needed for Skin Irritation.  LORazepam (ATIVAN) 1 mg tablet Take 1 Tab by mouth every four (4) hours as needed for Anxiety. Max Daily Amount: 6 mg. 2 Tab 0  
 divalproex (DEPAKOTE SPRINKLE) 125 mg capsule Take 2 Caps by mouth two (2) times a day. 60 Cap 0  
 pantoprazole (PROTONIX) 40 mg tablet Take 1 Tab by mouth two (2) times a day. 30 Tab 0  
 citalopram (CELEXA) 20 mg tablet Take  by mouth daily.     
 omega-3 fatty acids-vitamin e (FISH OIL) 1,000 mg cap Take 1 Cap by mouth.  fluticasone (FLONASE) 50 mcg/actuation nasal spray 2 Sprays by Both Nostrils route daily.  nutritional supplements (BROOKS) pack Take  by mouth.  risperiDONE (RISPERDAL) 1 mg tablet Take  by mouth daily.  acetaminophen (TYLENOL) 650 mg CR tablet Take 650 mg by mouth every six (6) hours as needed for Pain.  bisacodyl (BISCOLAX) 10 mg suppository Insert 10 mg into rectum daily.  loperamide (IMMODIUM) 2 mg tablet Take 2 mg by mouth four (4) times daily as needed for Diarrhea.  methocarbamol (ROBAXIN) 500 mg tablet Take 500 mg by mouth four (4) times daily.  MAG HYDROX/AL HYDROX/SIMETH (MINTOX PO) Take 15 mL by mouth daily as needed.  naproxen (NAPROSYN) 500 mg tablet Take 500 mg by mouth two (2) times daily (with meals).  risperiDONE (RISPERDAL) 0.5 mg tablet Take 0.5 mg by mouth as needed.  PSEUDOEPHEDRINE-dextromethorphan-guaiFENesin (ROBAFEN CF) 30- mg/5 mL solution Take 5 mL by mouth every four (4) hours as needed for Cough. Past History Past Medical History: 
Past Medical History:  
Diagnosis Date  Alzheimer's dementia  Down's syndrome  Seizure ([facility] [address] Past Surgical History: 
History reviewed. No pertinent surgical history. Family History: 
History reviewed. No pertinent family history. Social History: 
Social History Substance Use Topics  Smoking status: Never Smoker  Smokeless tobacco: Never Used  Alcohol use No  
 
 
Allergies: 
No Known Allergies Review of Systems Review of Systems Unable to perform ROS: Patient nonverbal  
Constitutional: Positive for fever (101.9 F). Respiratory: Positive for cough. Gastrointestinal: Positive for vomiting (x2). Physical Exam  
 
Vitals:  
 08/03/18 2201 08/03/18 0615 08/03/18 0630 08/03/18 0366 BP:  100/62 92/62 100/63 Pulse: 66 67 69 70 Resp: 16 16 13 16 Temp:      
SpO2: 100% 94% 90% 99% Weight:      
Height: Physical Exam  
Constitutional: He appears well-developed and well-nourished. Non-toxic appearance. No distress. Overweight. Warm to touch. HENT:  
Head: Normocephalic and atraumatic. Right Ear: External ear normal.  
Left Ear: External ear normal.  
Mouth/Throat: Oropharynx is clear and moist. Mucous membranes are dry. No oropharyngeal exudate. Eyes: Conjunctivae and EOM are normal. Pupils are equal, round, and reactive to light. No scleral icterus. No pallor. Sunken. Neck: Normal range of motion. Neck supple. No JVD present. No tracheal deviation present. No thyromegaly present. Cardiovascular: Regular rhythm and normal heart sounds. Tachycardia present. Pulmonary/Chest: Effort normal. No stridor. No respiratory distress. He has rhonchi in the right upper field. Audible wet cough. Abdominal: Soft. Bowel sounds are normal. There is no tenderness. There is no rebound and no guarding. Protuberant. Musculoskeletal: Normal range of motion. He exhibits no edema or tenderness. No soft tissue injuries Lymphadenopathy:  
  He has no cervical adenopathy. Neurological: He has normal reflexes. No cranial nerve deficit. Coordination normal. GCS eye subscore is 3. GCS verbal subscore is 3. GCS motor subscore is 6. Non-verbal.   
Skin: Skin is warm and dry. No rash noted. He is not diaphoretic. No erythema. Relatively well preserved turgor. Psychiatric: He has a normal mood and affect. His behavior is normal. Judgment and thought content normal.  
Nursing note and vitals reviewed. Diagnostic Study Results Labs - Recent Results (from the past 12 hour(s)) CBC WITH AUTOMATED DIFF Collection Time: 08/02/18 11:25 PM  
Result Value Ref Range WBC 6.0 4.6 - 13.2 K/uL  
 RBC 3.96 (L) 4.70 - 5.50 M/uL  
 HGB 13.2 13.0 - 16.0 g/dL HCT 39.9 36.0 - 48.0 % .8 (H) 74.0 - 97.0 FL  
 MCH 33.3 24.0 - 34.0 PG  
 MCHC 33.1 31.0 - 37.0 g/dL  
 RDW 13.5 11.6 - 14.5 % PLATELET 539 493 - 101 K/uL MPV 11.1 9.2 - 11.8 FL  
 NEUTROPHILS 91 (H) 40 - 73 % LYMPHOCYTES 6 (L) 21 - 52 % MONOCYTES 2 (L) 3 - 10 % EOSINOPHILS 0 0 - 5 % BASOPHILS 1 0 - 2 %  
 ABS. NEUTROPHILS 5.6 1.8 - 8.0 K/UL  
 ABS. LYMPHOCYTES 0.3 (L) 0.9 - 3.6 K/UL  
 ABS. MONOCYTES 0.1 0.05 - 1.2 K/UL  
 ABS. EOSINOPHILS 0.0 0.0 - 0.4 K/UL  
 ABS. BASOPHILS 0.0 0.0 - 0.1 K/UL  
 DF AUTOMATED METABOLIC PANEL, COMPREHENSIVE Collection Time: 08/02/18 11:25 PM  
Result Value Ref Range Sodium 139 136 - 145 mmol/L Potassium 3.7 3.5 - 5.5 mmol/L Chloride 106 100 - 108 mmol/L  
 CO2 25 21 - 32 mmol/L Anion gap 8 3.0 - 18 mmol/L Glucose 119 (H) 74 - 99 mg/dL BUN 20 (H) 7.0 - 18 MG/DL Creatinine 1.41 (H) 0.6 - 1.3 MG/DL  
 BUN/Creatinine ratio 14 12 - 20 GFR est AA >60 >60 ml/min/1.73m2 GFR est non-AA 51 (L) >60 ml/min/1.73m2 Calcium 8.2 (L) 8.5 - 10.1 MG/DL Bilirubin, total 0.6 0.2 - 1.0 MG/DL  
 ALT (SGPT) 43 16 - 61 U/L  
 AST (SGOT) 29 15 - 37 U/L Alk. phosphatase 133 (H) 45 - 117 U/L Protein, total 7.1 6.4 - 8.2 g/dL Albumin 2.5 (L) 3.4 - 5.0 g/dL Globulin 4.6 (H) 2.0 - 4.0 g/dL A-G Ratio 0.5 (L) 0.8 - 1.7 LACTIC ACID Collection Time: 08/02/18 11:25 PM  
Result Value Ref Range Lactic acid 3.0 (HH) 0.4 - 2.0 MMOL/L  
CULTURE, BLOOD Collection Time: 08/02/18 11:25 PM  
Result Value Ref Range Special Requests: NO SPECIAL REQUESTS Culture result: NO GROWTH AFTER 6 HOURS MAGNESIUM Collection Time: 08/02/18 11:25 PM  
Result Value Ref Range Magnesium 1.7 1.6 - 2.6 mg/dL PHOSPHORUS Collection Time: 08/02/18 11:25 PM  
Result Value Ref Range Phosphorus 1.3 (L) 2.5 - 4.9 MG/DL  
CULTURE, BLOOD Collection Time: 08/02/18 11:40 PM  
Result Value Ref Range Special Requests: NO SPECIAL REQUESTS Culture result: NO GROWTH AFTER 6 HOURS    
URINALYSIS W/ RFLX MICROSCOPIC  Collection Time: 08/03/18  1:10 AM  
Result Value Ref Range Color YELLOW Appearance CLOUDY Specific gravity 1.017 1.005 - 1.030    
 pH (UA) 6.5 5.0 - 8.0 Protein 30 (A) NEG mg/dL Glucose NEGATIVE  NEG mg/dL Ketone NEGATIVE  NEG mg/dL Bilirubin NEGATIVE  NEG Blood MODERATE (A) NEG Urobilinogen 1.0 0.2 - 1.0 EU/dL Nitrites POSITIVE (A) NEG Leukocyte Esterase LARGE (A) NEG URINE MICROSCOPIC ONLY Collection Time: 08/03/18  1:10 AM  
Result Value Ref Range WBC TOO NUMEROUS TO COUNT 0 - 5 /hpf  
 RBC 2 to 5 0 - 5 /hpf Epithelial cells FEW 0 - 5 /lpf Bacteria 4+ (A) NEG /hpf Mucus NEGATIVE  NEG /lpf LACTIC ACID Collection Time: 08/03/18  4:52 AM  
Result Value Ref Range Lactic acid 1.5 0.4 - 2.0 MMOL/L Radiologic Studies -  
XR CHEST PORT    (Results Pending) 1:53 AM 
RADIOLOGY FINDINGS Chest X-ray shows subtle infiltrate noted to right middle or upper lobe. Pending review by Radiologist 
Recorded by Sarah Pierre. Maribell Ramirez, ED Scribe, as dictated by Timi. Cayla Kim MD  
 
CXR Results  (Last 48 hours) None Medications given in the ED- Medications  
sodium chloride (NS) flush 5-10 mL (not administered) Vancomycin- Pharmacy to dose (not administered)  
vancomycin (VANCOCIN) 1250 mg in  ml infusion (not administered) 0.9% sodium chloride infusion (125 mL/hr IntraVENous Rate Change 8/3/18 0638) enoxaparin (LOVENOX) injection 40 mg (40 mg SubCUTAneous Given 8/3/18 0744) ondansetron (ZOFRAN) injection 4 mg (not administered)  
acetaminophen (TYLENOL) tablet 650 mg (not administered)  
sodium chloride 0.9 % bolus infusion 1,000 mL (not administered)  
cefepime (MAXIPIME) 2 g in sterile water (preservative free) 10 mL IV syringe (2 g IntraVENous Given 8/3/18 0744) levoFLOXacin (LEVAQUIN) 750 mg in D5W IVPB (750 mg IntraVENous New Bag 8/3/18 0752) potassium phosphate 20 mmol in 0.9% sodium chloride 250 mL infusion (not administered) NOREPINephrine (LEVOPHED) 8 mg in 5% dextrose 250mL infusion (not administered)  
vancomycin (VANCOCIN) 1250 mg in  ml infusion (1,250 mg IntraVENous New Bag 8/3/18 0510)  
sodium chloride 0.9 % bolus infusion 1,000 mL (0 mL IntraVENous IV Completed 8/3/18 0418) Followed by  
sodium chloride 0.9 % bolus infusion 1,000 mL (0 mL IntraVENous IV Completed 8/3/18 0508) Followed by  
sodium chloride 0.9 % bolus infusion 469 mL (0 mL IntraVENous IV Completed 8/3/18 0536) piperacillin-tazobactam (ZOSYN) 3.375 g in 0.9% sodium chloride (MBP/ADV) 100 mL MBP (0 g IntraVENous IV Completed 8/3/18 0445) Medical Decision Making I am the first provider for this patient. I reviewed the vital signs, available nursing notes, past medical history, past surgical history, family history and social history. Vital Signs-Reviewed the patient's vital signs. Pulse Oximetry Analysis - 95% on RA Cardiac Monitor: 
Rate: 90 bpm 
Rhythm: NSR Records Reviewed: Nursing Notes and Old Medical Records Provider Notes (Medical Decision Making): DDX: Fever in a nursing home facility concerning for viral illness, PNA, or UTI. Meningitis is possible but unlikely. Will start broad spectrum abx including coverage for PNA and UTI as well as aspiration. Vomiting was part of triage complaint. Have not seen during pt's stay. Procedures:  
Central Line 
Date/Time: 8/3/2018 6:40 AM 
Performed by: Abisai Diallo Authorized by: Abisai Diallo  
 
Consent:  
  Consent obtained:  Emergent situation (Consulted with admitting physician as well as lead nursing supervisor at nursing facility where pt stays.) Pre-procedure details:  
  Hand hygiene: Hand hygiene performed prior to insertion Sterile barrier technique: All elements of maximal sterile technique followed Skin preparation:  2% chlorhexidine Skin preparation agent: Skin preparation agent completely dried prior to procedure Anesthesia (see MAR for exact dosages):  
 Anesthesia method:  Local infiltration Local anesthetic:  Lidocaine 1% w/o epi Procedure details: Location:  R femoral 
  Site selection rationale:  Pt at risk for spontaneous motion and only limited consent by medical team not POA Patient position:  Flat Procedural supplies:  Triple lumen Catheter size:  7.5 Fr Landmarks identified: yes Ultrasound guidance: no   
  Number of attempts:  1 Successful placement: yes Post-procedure details: Post-procedure:  Dressing applied and line sutured Assessment:  Blood return through all ports and free fluid flow Patient tolerance of procedure: Tolerated well, no immediate complications ED Course:  
12:12 AM Initial assessment performed. The patients presenting problems have been discussed, and they are in agreement with the care plan formulated and outlined with them. I have encouraged them to ask questions as they arise throughout their visit. SEPSIS ASSESSMENT NOTE:  
12:15 AM 
Dhara Fish MD has seen and assessed the patient as follows: 
 
Capillary Refill:normal/brisk Cardiopulmonary Evaluation: 
 Lung Sounds: rhonchi 
 Cardiac Sounds: regular Peripheral Pulses: present Skin Exam: turgor good Visit Vitals  /63  Pulse 70  Temp 98.2 °F (36.8 °C)  Resp 16  
 Ht 5' 2\" (1.575 m)  Wt 82.1 kg (181 lb)  SpO2 99%  BMI 33.11 kg/m2 Written by Nova White ED Scribe, as dictated by Lisa Courtney MD   
 
1:47 AM Consult: Discussed patient's history, exam, and available diagnostics results with Terrell Diallo MD, hospitalist, who accepts admission to [facility]. 4:36 AM Tried to contact the pts brother via telephone (power of ). Left a message on the phone. 6:13 AM Consult: Discussed patient's history, exam, and available diagnostics results with Terrell Diallo MD, hospitalist, who recommends getting a central line.   
 
6:34 AM Spoke with the head nursing supervisor at the pt's nursing facility who agrees with doing a central line. She also states that they have had issues in the past getting a hold of the pt's emergency contact. Diagnosis and Disposition Critical Care Time: 6:33 AM 
I have spent 120 minutes of critical care time involved in lab review, consultations with specialist, family decision-making, and documentation. During this entire length of time I was immediately available to the patient. Critical Care: The reason for providing this level of medical care for this critically ill patient was due a critical illness that impaired one or more vital organ systems such that there was a high probability of imminent or life threatening deterioration in the patients condition. This care involved high complexity decision making to assess, manipulate, and support vital system functions, to treat this degreee vital organ system failure and to prevent further life threatening deterioration of the patients condition. Core Measures: 
For Hospitalized Patients: 
 
1. Hospitalization Decision Time: The decision to hospitalize the patient was made by Charity Middleton. Scott Butcher MD at 1:13 AM on 8/2/2018 2. Aspirin: Aspirin was not given because the patient did not present with a stroke at the time of their Emergency Department evaluation 1:51 AM  Patient is being admitted to the hospital by Norma Lopez MD, hospitalist. The results of their tests and reasons for their admission have been discussed with them and/or available family. They convey agreement and understanding for the need to be admitted and for their admission diagnosis. CONDITIONS ON ADMISSION: 
Sepsis is not present at the time of admission. Deep Vein Thrombosis is not present at the time of admission. Thrombosis is not present at the time of admission. Urinary Tract Infection is present at the time of admission. Pneumonia is present at the time of admission.  MRSA is not present at the time of admission. Wound infection is not present at the time of admission. Pressure Ulcer is not present at the time of admission. CLINICAL IMPRESSION: 
 
1. Fever in adult 2. TRANG (acute kidney injury) ([address] 3. Moderate dehydration 4. Cellulitis of left lower extremity 5. Lactic acid acidosis 6. Acute UTI PLAN: 
1. Admit to Telemetry 
_______________________________ Attestations: This note is prepared by Sandra Diallo. Ishaan Caruso, acting as Scribe for Nedra Cates. Jeannette Cano MD. Nedra Cates. Jeannette Cano MD:  The scribe's documentation has been prepared under my direction and personally reviewed by me in its entirety. I confirm that the note above accurately reflects all work, treatment, procedures, and medical decision making performed by me. 
_______________________________

## 2018-08-03 NOTE — PROGRESS NOTES
Palliative medicine consult noted and appreciated. Patient has Andrew Lynwood and is not decisional. He lives in a group home. No family at bedside. PM team will follow up at another time when family is available to identify goals of care.   
 
Gianluca Guerin RN

## 2018-08-03 NOTE — PROGRESS NOTES
Problem: Dysphagia (Adult) Goal: *Acute Goals and Plan of Care (Insert Text) Recommendations: 
Diet: Mech-soft, nectar-thick liquid Meds: Whole in puree or as tolerated Aspiration Precautions Oral Care TID Other: MBSS this day Goals:  Patient will: 1. Tolerate PO trials with 0 s/s overt distress in 4/5 trials 2. Utilize compensatory swallow strategies/maneuvers (decrease bite/sip, size/rate, alt. liq/sol) with min cues in 4/5 trials 3. Complete an objective swallow study (i.e., MBSS) to assess swallow integrity, r/o aspiration, and determine of safest LRD, min A Outcome: Progressing Towards Goal 
 
Speech LAnguage Pathology bedside swallow  
evaluation & TREATMENT Patient: Juli Quintanilla (38 y.o. male) Date: 8/3/2018 Primary Diagnosis: Sepsis ([facility] [address] Acute UTI TRANG (acute kidney injury) ([facility] [address] Sepsis ([facility] [address] Acute UTI Precautions: Aspiration PLOF: Assisted living ASSESSMENT : 
Based on the objective data described below, the patient presents with moderate oropharyngeal dysphagia in the setting of sepsis, UTI and TRANG. Patient with Down's syndrome, nonverbal. Patient alert, caregiver at bedside. Per caregiver report, mech-soft, NTL diet at baseline. Oral-motor exam revealed right lingual deviation, impaired coordination and poor dentition. Patient self-feeds with supervision at baseline, unable to do so this AM. Patient accepted SLP-fed nectar-thick liquid via spoon and straw and puree with impaired oral bolus control and delayed swallow initiation. Solid cracker trials; demo delayed mastication and lingual residue, cleared with NTL wash. Audible swallow across all trials. Patient demo delayed cough (~2 min following PO). Recommend mech-soft, NTL diet with strict aspiration precautions and MBSS this day.  D/w Librado GAVIN.  
 
Skilled therapy initiated; Educated caregiver on aspiration precautions and importance of compensatory swallow techniques to decrease aspiration risk (decrease rate of intake & sip/bite size, upright @HOB for all po intake and ~30 minutes after po); verbalized comprehension. Patient will benefit from skilled intervention to address the above impairments. Patients rehabilitation potential is considered to be Fair Factors which may influence rehabilitation potential include:  
[]            None noted [x]            Mental ability/status []            Medical condition []            Home/family situation and support systems [x]            Safety awareness 
[]            Pain tolerance/management []            Other: PLAN : 
Recommendations and Planned Interventions: 
Mech-soft, NTL Frequency/Duration: Patient will be followed by speech-language pathology 1-2 times per day/4-7 days per week to address goals. Discharge Recommendations: To Be Determined SUBJECTIVE:  
Patient stated N/A. OBJECTIVE:  
 
Past Medical History:  
Diagnosis Date  Alzheimer's dementia  Down's syndrome  Seizure ([facility] [address] History reviewed. No pertinent surgical history. Prior Level of Function/Home Situation: Assisted living (group home) Diet prior to admission: Mech-soft/NTL Current Diet:  Mech-soft/NTL Cognitive and Communication Status: 
Neurologic State: Alert Orientation Level: Unable to verbalize (per sitter this is patients baseline, pt is able to communicate pain unable to communicate anything else. ) Cognition: Decreased attention/concentration, Decreased command following Perception: Appears intact Safety/Judgement: Not assessed Oral Assessment: 
Oral Assessment Labial: Decreased seal;Impaired coordination Dentition: Natural 
Oral Hygiene: Fair Lingual: Right deviation; Incoordinated Velum: Unable to visualize Mandible: No impairment P.O. Trials: 
Patient Position: HOB 50 Vocal quality prior to P.O.: No impairment Consistency Presented: Solid; Nectar thick liquid How Presented: SLP-fed/presented;Straw;Spoon Bolus Acceptance: No impairment Bolus Formation/Control: Impaired Type of Impairment: Delayed;Mastication Propulsion: Delayed (# of seconds) Oral Residue: 10-50% of bolus; Lingual 
Initiation of Swallow: Delayed (# of seconds) Laryngeal Elevation: Functional 
Aspiration Signs/Symptoms: Delayed cough/throat clear Pharyngeal Phase Characteristics: Suspected pharyngeal residue; Audible swallow Effective Modifications: Alternate liquids/solids;Small sips and bites Cues for Modifications: Maximal 
  
 
Oral Phase Severity: Moderate Pharyngeal Phase Severity : Moderate GCODESwallowing:  Swallow Current Status CL= 60-79%  Swallow Goal Status CJ= 20-39% The severity rating is based on the following outcomes: YAMINI Noms Swallow Level 3 Clinical Judgement PAIN: 
Start of Eval/Tx: 0 End of Eval/Tx: 0 After treatment:  
[]            Patient left in no apparent distress sitting up in chair 
[x]            Patient left in no apparent distress in bed 
[x]            Call bell left within reach [x]            Nursing notified []            Family present [x]            Caregiver present 
[]            Bed alarm activated COMMUNICATION/EDUCATION:  
[x]            Safe swallowing guidelines; compensatory techniques. [x]            Patient/caregiver have participated as able in goal setting and plan of care. [x]            Patient/caregiver agree to work toward stated goals and plan of care. []            Patient understands intent and goals of therapy; neutral about participation. [x]            Patient unable to participate in goal setting/plan of care; educ ongoing with interdisciplinary staff/caregiver 
[]         Posted safety precautions in patient's room. Thank you for this referral. 
SAMAN Lockhart Time Calculation: 20 mins Evaluation Time: 10 minutes Treatment Time: 10 minutes

## 2018-08-03 NOTE — PROGRESS NOTES
Critical Care Note: sepsis Assessment and Plan by system: CVS:follow HR for now, hypotensuion, add pressor support Resp:no hypoxia seen, NC 02 
GI:PPI for hx GERD Neuro:dementia state, hx of seizures, resume home meds :sutton catheter with I/O Endo:no diabetes Heme:DVT proph-lovenox ID:sepsis protocol in H&P, IV abx as orderd, follow cx Alber Turner F/E/N:IV bolus again and kaintenance IVF 
DVT proph:above 40 minutes of critical care time spent in the direct evaluation and treatment of this high risk patient. The reason for providing this level of medical care for this critically ill patient was due a critical illness that impaired one or more vital organ systems such that there was a high probability of imminent or life threatening deterioration in the patients condition. This care involved high complexity decision making to assess, manipulate, and support vital system functions, to treat this degreee vital organ system failure and to prevent further life threatening deterioration of the patients condition.

## 2018-08-03 NOTE — ED TRIAGE NOTES
Pt to ED via EMS from [address] for fever of 103 onset this evening with associated symptoms of vomiting. Aid present states that he's altered, usually more \"jolly\" and \"talkative. \" Pt hx of down syndrome, borderline HTN, and seizures.

## 2018-08-03 NOTE — ED NOTES
Pt hourly rounding competed. Safety Pt (x resting on stretcher with side rails up and call bell in reach. () in chair 
  () in parents arms. Toileting Pt offered ()Bedpan 
   ()Assistance to Restroom 
   ()Urinal 
Ongoing Updates Updated on plan of care and status of test results. Pain Management Inquired as to comfort and offered comfort measures: 
  (x) warm blankets (x) dimmed lights

## 2018-08-03 NOTE — H&P
[facility]ICAL Elly Whatley 
MR#: [id number] : 1955 ACCOUNT #: [de-identified] ADMIT DATE: 2018 ADMITTING DIAGNOSES: 
1. Sepsis. 2.  Urinary tract infection. 3.  Down syndrome with mental retardation, Alzheimer dementia. HISTORY OF PRESENT ILLNESS:  This is a 81-year-old patient who lives at [facility]. He has Down syndrome. He has dementia, history of seizures. He came to the hospital with fever that started last night. He was up to 103 at the assisted living facility, had 2 episodes of vomiting per the caregiver with him. They tried Tylenol via EMS en route to the hospital.  He had been eating, drinking and in his usual state of health up until yesterday. He was found to have evidence for UTI in the emergency room. Fsih catheter is in place and I cannot tell if that was placed here or there. We will have to investigate and then ultimately he is going to receive a femoral line for continued fluid resuscitation as his blood pressures have been on the low normal side despite fluid resuscitation in the emergency room. He is going to be admitted to the ICU in the interim for sepsis with early shock. His PCP is Dr. Mitchell Jean Baptiste. PAST MEDICAL HISTORY:  Alzheimer dementia, Down syndrome, seizures. It looks like he was last here in the hospital in 2017 for cellulitis of his buttock, UTI, cellulitis of his thigh and his brother's name is Andres Mariano. I do not think he has an indwelling catheter. PAST SURGICAL HISTORY:  Unknown to me, nor is the family history. He is not able to give any of these reports and the caregiver with him only knows about his care at the facility. SOCIAL HISTORY:  Does not smoke or drink. He is usually wheelchair dependent. ALLERGIES:  NONE KNOWN. MEDICATIONS THAT HE TAKES AT THE FACILITY:  Include: 1. Tylenol. 2.  Bisacodyl. 3.  Celexa. 4.  Clinda-gel. 5.  Depakote Sprinkles. 6.  Flonase. 7.  Imodium. 8.  Ativan. 9.  Naprosyn. 10.  Risperdal.   
11.  Pseudoephedrine. ALLERGIES:  NO KNOWN ALLERGIES LISTED. REVIEW OF SYSTEMS:  Not obtainable from the patient; what is noted in the HPI is the only history available at this point in time. PHYSICAL EXAMINATION: 
GENERAL:  The patient is awake. He looks febrile. He is in no acute distress. He does not have any rigors or shakes at this point. VITAL SIGNS:  His T-max is 101.9, now down to 98.2, pulse of 67, blood pressure 100/62, but it had been as low as 82/53, respiratory rate of 16.  Spo2 is 94% on room air. HEENT:  Oropharynx is dry. No lesions. Sclerae are anicteric. Conjunctivae pink. NECK:  Supple, no thyromegaly, lymphadenopathy. LUNGS:  Clear anteriorly. No rales, rhonchi, wheezes. HEART:  Regular rate and rhythm. No murmur, rub or gallop. ABDOMEN:  Mildly distended, diminished bowel sounds, not tender. No rebound or guarding. No ascites noted. Normal male genitalia with a Fish catheter in place, dark yellow urine draining in the Fish bag. LOWER EXTREMITIES:  Venous stasis, 2+ edema of the lower extremities. LABORATORY DATA:  His white count is 6, H and H 13.2 and 39.9, platelets are 921. The urinalysis was positive for nitrites, large leukocyte esterase, too numerous to count, WBCs at 4+ bacteria. BUN and creatinine 20 and 1.41. Phosphorus was 1.3. LFTs were unremarkable. Lactate was 3 and reduced to 1.5 after hydration. Urine cultures, blood cultures and viral cultures are sent. He had a chest x-ray that shows from my review no acute cardiopulmonary disease. Full report pending per radiology and he has received antibiotics in the emergency room. ASSESSMENT: 
1.  Sepsis due to urinary tract infection. 2.  Borderline blood pressure requiring further resuscitation and placement of a femoral line catheter to ensure good IV access if pressors are required 3.   Lactic acidosis is resolved. PLAN:  Admit for sepsis protocol, IV antibiotics. We will continue the cefepime, Levaquin and vancomycin for right now until further culture identifications are made. Continue IV fluid resuscitation, give another fluid bolus 1000 mL of normal saline. Tylenol as needed for headache. Zofran as needed for nausea and repeat a CBC and BMP in the morning. There is some mild acute kidney injury, but I think that will get better with hydration. We are going to order K-Phos due to the low phosphorus. We will go with potassium phosphorus 20 mL over 4 hours. That can be done in the ICU. I have ordered a soft diet for him. DVT prophylaxis with Lovenox. Antiemetics with Zofran. Consult with the intensivist for sepsis and I expect that if his blood pressure normalizes and he responds to the treatment, that he can be moved from the ICU fairly quickly, but at this point, the ER physician and myself are a little concerned that his blood pressure has been hanging on the low normal range as has his MAP, and with that if he gets to the floor and requires further treatment, he would have to be transferred to the unit in the interim. So, we are looking at least a few days in the hospital to get culture results back. Treat his UTI, treat the sepsis. His lactate has normalized. We will get his hydration status up. We will follow for diet instructions. We will get speech therapy to see him as well. We are putting him on DVT prophylaxis and I have resumed his appropriate medications from home that he should be on at this point in time, I have also ordered a PPI for him to continue for a history of GERD. Expected length of stay at least 3 days. MD AILEEN Alston/SNEHAL 
D: 08/03/2018 06:47    
T: 08/03/2018 13:39 
JOB #: [id number]

## 2018-08-03 NOTE — ROUTINE PROCESS
TRANSFER - IN REPORT: 
 
Verbal report received from ALFREDO DormanRN (name) on Gaby Wilder  being received from ED(unit) for routine progression of care Report consisted of patients Situation, Background, Assessment and  
Recommendations(SBAR). Information from the following report(s) SBAR, Kardex, ED Summary, Intake/Output and Recent Results was reviewed with the receiving nurse. Opportunity for questions and clarification was provided. Assessment completed upon patients arrival to unit and care assumed. Pt unable to follow commands, moans, moves all extreme ties purposefully. Unable to assess mental status. NS on telemetry, 99% on room air, sutton, right groin central line. No swelling or bleeding noted at this time. SBP mid 80's. IV fluids running. 
1600. Reassessed, status unchanged, levo started. Pt tolerating well. No family present at this time. 1908. Bedside, Verbal and Written shift change report given to ALFREDO Kuhn (oncoming nurse) by Anabel Lara (offgoing nurse). Report included the following information SBAR, Kardex, ED Summary, Intake/Output and Recent Results.

## 2018-08-03 NOTE — ED NOTES
Dr. Dawson Gonsales made aware of situation via SBAR telephone orders received to place ice packs on patient and telephone order received for ibuprofen 400mg PO orders read back and verified.

## 2018-08-03 NOTE — PROGRESS NOTES
Pharmacy Dosing Services: Vancomycin Consult for Vancomycin Dosing by Pharmacy by Dr. Carole Carney Consult provided for this 65y.o. year old male , for indication of Skin and Soft Tissue Infection. Day of Therapy 1 Ht Readings from Last 1 Encounters:  
08/03/18 157.5 cm (62\") Wt Readings from Last 1 Encounters:  
08/03/18 82.1 kg (181 lb) Other Current Antibiotics Ceftriaxone 1 g IV every 24 hours Significant Cultures Pending Serum Creatinine Lab Results Component Value Date/Time Creatinine 1.41 (H) 08/02/2018 11:25 PM  
 Creatinine, POC 0.8 02/20/2018 10:25 AM  
  
Creatinine Clearance Estimated Creatinine Clearance: 51 mL/min (based on Cr of 1.41). BUN Lab Results Component Value Date/Time BUN 20 (H) 08/02/2018 11:25 PM  
  
WBC Lab Results Component Value Date/Time WBC 6.0 08/02/2018 11:25 PM  
  
H/H Lab Results Component Value Date/Time HGB 13.2 08/02/2018 11:25 PM  
  
Platelets Lab Results Component Value Date/Time PLATELET 952 29/62/6172 11:25 PM  
  
Temp 98.2 °F (36.8 °C) Start Vancomycin therapy, with loading dose of 1250 mg at 0500 8/3/18. Follow with maintenance dose of 1250 mg at 0500 8/4/18, every 24 hours. Dose calculated to approximate a therapeutic trough of 10-15 mcg/mL. Pharmacy to follow daily and will make changes to dose and/or frequency based on clinical status. Pharmacist Angel Elias 97

## 2018-08-03 NOTE — ED NOTES
Pt hourly rounding competed. Safety Pt (x) resting on stretcher with side rails up and call bell in reach. () in chair 
  () in parents arms. Toileting Pt offered ()Bedpan 
   ()Assistance to Restroom 
   ()Urinal 
Ongoing Updates Updated on plan of care and status of test results. Pain Management Inquired as to comfort and offered comfort measures: 
  (x) warm blankets (x) dimmed lights Pt sleeping heavily, responds to verbal stimulation. Aid at bedside. Monitoring blood pressure closely, discussed MAP of 55 with MD. MD contacted POA (pt's brother), no answer. Updated charge RN and nursing supervisor. Pt to continue to be monitored closely in ED.

## 2018-08-03 NOTE — ROUTINE PROCESS
1920: Bedside verbal shift change report received from Umer Barker Rn(offgoing nurse) given to Lacho Castillo RN(oncoming nurse). 1945: Assessment completed [id number] Shoulders from lab called with blood cultures positive for gram neg rods and gram positive cocci. 2 sets Blood cultures collected [email or web address]. Unable to find out where the cultures were collected from 2040: Paged Dr. Gloria Hearing through answering service to report positive blood cultures awaiting return call  
0000 Notified Dr. Gloria Hearing of low HR while pt sleeping. No new orders at this time.   
[id number] Report given to Ameya Gunter RN

## 2018-08-03 NOTE — ED NOTES
Pt hourly rounding competed. Safety Pt (x) resting on stretcher with side rails up and call bell in reach. () in chair 
  () in parents arms. Toileting Pt offered ()Bedpan 
   ()Assistance to Restroom 
   ()Urinal 
Ongoing Updates Updated on plan of care and status of test results. Pain Management Inquired as to comfort and offered comfort measures: 
  (x) warm blankets (x) dimmed lights Pt relocated closer to nurse's desk and will continue to be monitored closely.

## 2018-08-03 NOTE — PROGRESS NOTES
Reason for Admission:   fever RRAT Score: 8 Plan for utilizing home health:  TBD Likelihood of Readmission: TBD Transition of Care Plan:   Chart reviewed and visited pt in ED,[facility] with Fever along with reported symptoms of vomiting ,pt has Downs syndrome unable to make own decision, 5 star caretaker Joe Dumont at bedside, informed cm patients brother Bhavani Hoff is decision maker which cm was informed he will be arriving at [facility] shortly ,per caregiver pt basically w/c bound pushes self by using his feet, admitting diagnosis sepsis, unit cm will follow up with d/c planning.

## 2018-08-03 NOTE — PROGRESS NOTES
Problem: Dysphagia (Adult) Goal: *Acute Goals and Plan of Care (Insert Text) Recommendations: 
Diet: Mech-soft, nectar-thick liquid Meds: Whole in puree or as tolerated Aspiration Precautions Oral Care TID Other: ALTERNATE BITES OF SOLID AND SIPS OF LIQUIDS Goals:  Patient will: 1. Tolerate PO trials with 0 s/s overt distress in 4/5 trials 2. Utilize compensatory swallow strategies/maneuvers (decrease bite/sip, size/rate, alt. liq/sol) with min cues in 4/5 trials 3. Complete an objective swallow study (i.e., MBSS) to assess swallow integrity, r/o aspiration, and determine of safest LRD, min A - goal met 8/3 Outcome: Progressing Towards Goal 
Speech Pathology Modified barium swallow Study Patient: Yesenia Clinton (83 y.o. male) Date: 8/3/2018 Primary Diagnosis: Sepsis ([facility] [address] Acute UTI TRANG (acute kidney injury) ([facility] [address] Sepsis ([facility] [address] Acute UTI Precautions: Aspiration PLOF: Assisted Living ASSESSMENT : 
MBS completed with moderate penetration of thin liquid, trace penetration of nectar-thick liquid. Pt with oral delay, premature spillage and vallecular residue with puree and solid. Due to impaired command following, pt unable to clear vallecular residue with repeat swallow; resolved with NTL wash to clear. Deficits include decreased strength, decreased elevation/closure and decreased pharyngeal wall constriction. Pt presents with moderate oropharyngeal dysphagia, as evidenced above, which places pt at risk for aspiration. At this time, safest for mechanical soft solid, nectar-thick liquid diet with strict aspiration precautions and alternated sips/bites. SLP will continue to follow to assess diet tolerance and caregiver education. Of note, patient with emesis prior to PO trials for MBSS, gown changed. Patient will benefit from skilled intervention to address the above impairments. Patients rehabilitation potential is considered to be Fair Factors which may influence rehabilitation potential include:  
[]              None noted [x]              Mental ability/status []              Medical condition []              Home/family situation and support systems [x]              Safety awareness []              Pain tolerance/management 
[]              Other: PLAN : 
Recommendations and Planned Interventions: 
Mech-soft, NTL Frequency/Duration: Patient will be followed by speech-language pathology 1-2 times per day/4-7 days per week to address goals. Discharge Recommendations: To Be Determined SUBJECTIVE:  
Patient stated N/A. OBJECTIVE:  
 
Past Medical History:  
Diagnosis Date  Alzheimer's dementia  Down's syndrome  Seizure ([facility] [address] History reviewed. No pertinent surgical history. Prior Level of Function/Home Situation: Assisted living Diet prior to admission: Mech-soft/NTL Current Diet:  Mech-soft/NTL Radiologist: [facility] Film Views: Lateral 
Patient Position: Chair 90 Decreased Tongue Base Retraction?: No 
Laryngeal Elevation: Incomplete laryngeal closure Aspiration/Penetration Score: 2 (Penetration/No residue-Contrast enters the airway penetrates, remains above the folds/cords, and is cleared) Pharyngeal Symmetry: Not assessed Pharyngeal-Esophageal Segment: No impairment Pharyngeal Dysfunction: Decreased strength;Decreased elevation/closure;Decreased pharyngeal wall constriction Oral Phase Severity: Mild-moderate Pharyngeal Phase Severity: Mild moderate GCODESwallowing:  Swallow Current Status CK= 40-59%  Swallow Goal Status CJ= 20-39% The severity rating is based on the following outcomes: YAMINI Noms Swallow Level 4   
8-point Penetration-Aspiration Scale: Score 2 Clinical Judgement PAIN: 
Start of Study: 0 End of Study: 0  
 
COMMUNICATION/EDUCATION:  
[x]  Aspiration risks/precautions; compensatory swallow techniques [x]  Patient/caregiver have participated as able in goal setting and plan of care. 
[]  Patient/family agree to work toward stated goals and plan of care. []  Patient understands intent and goals of therapy, but is neutral about his/her participation. [x]  Patient is unable to participate in goal setting and plan of care. Thank you for this referral. 
Chandler Caballero, SLP Time Calculation: 20 mins

## 2018-08-03 NOTE — PROGRESS NOTES
Occupational Therapy Screening: 
Services are not indicated at this time. [facility] for [facility] therapy based on results obtained during the nursing admission assessment. The patients chart was reviewed and the patient is not appropriate for a skilled therapy evaluation at this time. Pt admitted w/ sepsis from Group home where she is w/c-bound and intermittent assist w/ ADLs. Please encourage chair position for meals and OOB for toileting to maintain strength & functioning per mobility protocol. Please consult occupational therapy if any therapy needs arise. Thank you.  
Stacey Arndt, OTR/L

## 2018-08-03 NOTE — PROGRESS NOTES
Speech Therapy Note: Modified barium swallow study completed with moderate penetration of thin liquid +/- straw and vallecular residue with puree and solid trials. Recommend patient continue mechanical soft solids with nectar-thick liquid and aspiration precautions. Patient must be fed with alternating bites/sips. Full report to follow. Yoav Miller M.S., CF-SLP Speech Language Pathologist

## 2018-08-03 NOTE — PROGRESS NOTES
Patient seen this afternoon for follow up of morning admission. Continue with plan set in place by Dr Adolph Sarabia. Dr Diamond Olivera had adjusted antibiotics.

## 2018-08-03 NOTE — PROGRESS NOTES
He requires a central line due to sepsis with low BP. No family here, only a caregiver. In terms of immediacy and emergent treatment needs, this is vital to ensuring appropriate treatment for sepsis. Admitted 8/3 am, H&P dictated-sepsis and UTI due to catheter, he has Lark Matar and is noncommunicatibve

## 2018-08-03 NOTE — ED NOTES
TRANSFER - OUT REPORT: 
 
Verbal report given to Marcia Silveira (name) on Nelva Buerger  being transferred to ICU(unit) for routine progression of care Report consisted of patients Situation, Background, Assessment and  
Recommendations(SBAR). Information from the following report(s) SBAR, Kardex, ED Summary, Intake/Output, MAR and Recent Results was reviewed with the receiving nurse. Lines:  
Triple Lumen 08/03/18 Right Femoral (Active) Central Line Being Utilized Yes 8/3/2018  7:18 AM  
Site Assessment Clean, dry, & intact 8/3/2018  7:18 AM  
Infiltration Assessment 0 8/3/2018  7:18 AM  
Dressing Status Clean, dry, & intact 8/3/2018  7:18 AM  
Proximal Hub Color/Line Status Blue 8/3/2018  7:18 AM  
Positive Blood Return (Medial Site) Yes 8/3/2018  7:18 AM  
Medial Hub Color/Line Status White 8/3/2018  7:18 AM  
Positive Blood Return (Lateral Site) Yes 8/3/2018  7:18 AM  
Distal Hub Color/Line Status Brown 8/3/2018  7:18 AM  
Positive Blood Return (Site #3) Yes 8/3/2018  7:18 AM  
   
Peripheral IV 08/02/18 Left Wrist (Active) Site Assessment Clean, dry, & intact 8/2/2018 11:25 PM  
Phlebitis Assessment 0 8/2/2018 11:25 PM  
Infiltration Assessment 0 8/2/2018 11:25 PM  
Dressing Status Clean, dry, & intact 8/2/2018 11:25 PM  
Dressing Type 4 X 4 8/2/2018 11:25 PM  
Hub Color/Line Status Patent; Flushed 8/2/2018 11:25 PM  
  
 
Opportunity for questions and clarification was provided. Patient transported with: 
 Monitor Registered Nurse Tech

## 2018-08-03 NOTE — ED NOTES
Report received from [facility]. Pt laying in bed with sitter at bedside. Sitter states pt is at baseline mental status. Cardiac monitor in place.

## 2018-08-04 ENCOUNTER — APPOINTMENT (OUTPATIENT)
Dept: GENERAL RADIOLOGY | Age: 63
DRG: 871 | End: 2018-08-04
Attending: INTERNAL MEDICINE
Payer: MEDICARE

## 2018-08-04 LAB
ALBUMIN SERPL-MCNC: 2 G/DL (ref 3.4–5)
ALBUMIN/GLOB SERPL: 0.5 {RATIO} (ref 0.8–1.7)
ALP SERPL-CCNC: 78 U/L (ref 45–117)
ALT SERPL-CCNC: 33 U/L (ref 16–61)
ANION GAP SERPL CALC-SCNC: 7 MMOL/L (ref 3–18)
AST SERPL-CCNC: 25 U/L (ref 15–37)
BILIRUB SERPL-MCNC: 0.4 MG/DL (ref 0.2–1)
BUN SERPL-MCNC: 10 MG/DL (ref 7–18)
BUN/CREAT SERPL: 9 (ref 12–20)
CA-I SERPL-SCNC: 1.1 MMOL/L (ref 1.12–1.32)
CA-I SERPL-SCNC: 1.14 MMOL/L (ref 1.12–1.32)
CALCIUM SERPL-MCNC: 7.9 MG/DL (ref 8.5–10.1)
CHLORIDE SERPL-SCNC: 112 MMOL/L (ref 100–108)
CO2 SERPL-SCNC: 24 MMOL/L (ref 21–32)
CREAT SERPL-MCNC: 1.06 MG/DL (ref 0.6–1.3)
ERYTHROCYTE [DISTWIDTH] IN BLOOD BY AUTOMATED COUNT: 13.9 % (ref 11.6–14.5)
GLOBULIN SER CALC-MCNC: 4.1 G/DL (ref 2–4)
GLUCOSE BLD STRIP.AUTO-MCNC: 124 MG/DL (ref 70–110)
GLUCOSE BLD STRIP.AUTO-MCNC: 131 MG/DL (ref 70–110)
GLUCOSE BLD STRIP.AUTO-MCNC: 131 MG/DL (ref 70–110)
GLUCOSE BLD STRIP.AUTO-MCNC: 91 MG/DL (ref 70–110)
GLUCOSE SERPL-MCNC: 134 MG/DL (ref 74–99)
HCT VFR BLD AUTO: 36.2 % (ref 36–48)
HGB BLD-MCNC: 11.8 G/DL (ref 13–16)
MAGNESIUM SERPL-MCNC: 2.2 MG/DL (ref 1.6–2.6)
MCH RBC QN AUTO: 33.4 PG (ref 24–34)
MCHC RBC AUTO-ENTMCNC: 32.6 G/DL (ref 31–37)
MCV RBC AUTO: 102.5 FL (ref 74–97)
PHOSPHATE SERPL-MCNC: 2 MG/DL (ref 2.5–4.9)
PHOSPHATE SERPL-MCNC: 2.3 MG/DL (ref 2.5–4.9)
PLATELET # BLD AUTO: 137 K/UL (ref 135–420)
PMV BLD AUTO: 10.8 FL (ref 9.2–11.8)
POTASSIUM SERPL-SCNC: 3.6 MMOL/L (ref 3.5–5.5)
POTASSIUM SERPL-SCNC: 4.3 MMOL/L (ref 3.5–5.5)
PROT SERPL-MCNC: 6.1 G/DL (ref 6.4–8.2)
RBC # BLD AUTO: 3.53 M/UL (ref 4.7–5.5)
SODIUM SERPL-SCNC: 143 MMOL/L (ref 136–145)
WBC # BLD AUTO: 11.1 K/UL (ref 4.6–13.2)

## 2018-08-04 PROCEDURE — 74011250636 HC RX REV CODE- 250/636: Performed by: INTERNAL MEDICINE

## 2018-08-04 PROCEDURE — 82330 ASSAY OF CALCIUM: CPT | Performed by: EMERGENCY MEDICINE

## 2018-08-04 PROCEDURE — A4216 STERILE WATER/SALINE, 10 ML: HCPCS | Performed by: INTERNAL MEDICINE

## 2018-08-04 PROCEDURE — 74011250636 HC RX REV CODE- 250/636: Performed by: HOSPITALIST

## 2018-08-04 PROCEDURE — 83735 ASSAY OF MAGNESIUM: CPT | Performed by: INTERNAL MEDICINE

## 2018-08-04 PROCEDURE — 94640 AIRWAY INHALATION TREATMENT: CPT

## 2018-08-04 PROCEDURE — 84100 ASSAY OF PHOSPHORUS: CPT | Performed by: INTERNAL MEDICINE

## 2018-08-04 PROCEDURE — 82962 GLUCOSE BLOOD TEST: CPT

## 2018-08-04 PROCEDURE — P9047 ALBUMIN (HUMAN), 25%, 50ML: HCPCS | Performed by: INTERNAL MEDICINE

## 2018-08-04 PROCEDURE — 74011250637 HC RX REV CODE- 250/637: Performed by: INTERNAL MEDICINE

## 2018-08-04 PROCEDURE — 74011000250 HC RX REV CODE- 250: Performed by: INTERNAL MEDICINE

## 2018-08-04 PROCEDURE — 74011250637 HC RX REV CODE- 250/637: Performed by: HOSPITALIST

## 2018-08-04 PROCEDURE — 74011250636 HC RX REV CODE- 250/636: Performed by: EMERGENCY MEDICINE

## 2018-08-04 PROCEDURE — 74011250637 HC RX REV CODE- 250/637: Performed by: EMERGENCY MEDICINE

## 2018-08-04 PROCEDURE — 87040 BLOOD CULTURE FOR BACTERIA: CPT | Performed by: INTERNAL MEDICINE

## 2018-08-04 PROCEDURE — 82330 ASSAY OF CALCIUM: CPT | Performed by: INTERNAL MEDICINE

## 2018-08-04 PROCEDURE — 36415 COLL VENOUS BLD VENIPUNCTURE: CPT | Performed by: INTERNAL MEDICINE

## 2018-08-04 PROCEDURE — 84132 ASSAY OF SERUM POTASSIUM: CPT | Performed by: EMERGENCY MEDICINE

## 2018-08-04 PROCEDURE — 97162 PT EVAL MOD COMPLEX 30 MIN: CPT

## 2018-08-04 PROCEDURE — 71045 X-RAY EXAM CHEST 1 VIEW: CPT

## 2018-08-04 PROCEDURE — 84100 ASSAY OF PHOSPHORUS: CPT | Performed by: EMERGENCY MEDICINE

## 2018-08-04 PROCEDURE — 74011000258 HC RX REV CODE- 258: Performed by: EMERGENCY MEDICINE

## 2018-08-04 PROCEDURE — 65660000000 HC RM CCU STEPDOWN

## 2018-08-04 PROCEDURE — 85027 COMPLETE CBC AUTOMATED: CPT | Performed by: EMERGENCY MEDICINE

## 2018-08-04 RX ORDER — POTASSIUM CHLORIDE 7.45 MG/ML
10 INJECTION INTRAVENOUS
Status: DISCONTINUED | OUTPATIENT
Start: 2018-08-04 | End: 2018-08-04

## 2018-08-04 RX ORDER — SODIUM,POTASSIUM PHOSPHATES 280-250MG
2 POWDER IN PACKET (EA) ORAL
Status: COMPLETED | OUTPATIENT
Start: 2018-08-04 | End: 2018-08-04

## 2018-08-04 RX ORDER — PANTOPRAZOLE SODIUM 40 MG/1
40 TABLET, DELAYED RELEASE ORAL
Status: DISCONTINUED | OUTPATIENT
Start: 2018-08-05 | End: 2018-08-08 | Stop reason: HOSPADM

## 2018-08-04 RX ORDER — POTASSIUM CHLORIDE 14.9 MG/ML
20 INJECTION INTRAVENOUS ONCE
Status: COMPLETED | OUTPATIENT
Start: 2018-08-04 | End: 2018-08-04

## 2018-08-04 RX ORDER — ALBUMIN HUMAN 250 G/1000ML
12.5 SOLUTION INTRAVENOUS ONCE
Status: COMPLETED | OUTPATIENT
Start: 2018-08-04 | End: 2018-08-04

## 2018-08-04 RX ADMIN — Medication 20 MEQ: at 03:16

## 2018-08-04 RX ADMIN — SODIUM CHLORIDE 125 ML/HR: 900 INJECTION, SOLUTION INTRAVENOUS at 04:20

## 2018-08-04 RX ADMIN — VANCOMYCIN HYDROCHLORIDE 1250 MG: 10 INJECTION, POWDER, LYOPHILIZED, FOR SOLUTION INTRAVENOUS at 04:18

## 2018-08-04 RX ADMIN — MEROPENEM 500 MG: 500 INJECTION, POWDER, FOR SOLUTION INTRAVENOUS at 06:39

## 2018-08-04 RX ADMIN — POTASSIUM & SODIUM PHOSPHATES POWDER PACK 280-160-250 MG 2 PACKET: 280-160-250 PACK at 18:13

## 2018-08-04 RX ADMIN — PANTOPRAZOLE SODIUM 40 MG: 40 TABLET, DELAYED RELEASE ORAL at 08:29

## 2018-08-04 RX ADMIN — IPRATROPIUM BROMIDE AND ALBUTEROL SULFATE 3 ML: .5; 3 SOLUTION RESPIRATORY (INHALATION) at 01:29

## 2018-08-04 RX ADMIN — Medication 20 MEQ: at 06:14

## 2018-08-04 RX ADMIN — POTASSIUM & SODIUM PHOSPHATES POWDER PACK 280-160-250 MG 2 PACKET: 280-160-250 PACK at 14:30

## 2018-08-04 RX ADMIN — IPRATROPIUM BROMIDE AND ALBUTEROL SULFATE 3 ML: .5; 3 SOLUTION RESPIRATORY (INHALATION) at 07:18

## 2018-08-04 RX ADMIN — CALCIUM GLUCONATE 2 G: 94 INJECTION, SOLUTION INTRAVENOUS at 02:17

## 2018-08-04 RX ADMIN — DIVALPROEX SODIUM 250 MG: 125 CAPSULE, COATED PELLETS ORAL at 20:46

## 2018-08-04 RX ADMIN — POTASSIUM & SODIUM PHOSPHATES POWDER PACK 280-160-250 MG 2 PACKET: 280-160-250 PACK at 04:00

## 2018-08-04 RX ADMIN — ALBUMIN (HUMAN) 12.5 G: 0.25 INJECTION, SOLUTION INTRAVENOUS at 12:42

## 2018-08-04 RX ADMIN — CITALOPRAM HYDROBROMIDE 20 MG: 20 TABLET ORAL at 08:29

## 2018-08-04 RX ADMIN — POTASSIUM & SODIUM PHOSPHATES POWDER PACK 280-160-250 MG 2 PACKET: 280-160-250 PACK at 02:23

## 2018-08-04 RX ADMIN — MEROPENEM 500 MG: 500 INJECTION, POWDER, FOR SOLUTION INTRAVENOUS at 16:26

## 2018-08-04 RX ADMIN — DIVALPROEX SODIUM 250 MG: 125 CAPSULE, COATED PELLETS ORAL at 08:28

## 2018-08-04 RX ADMIN — SODIUM CHLORIDE 75 ML/HR: 900 INJECTION, SOLUTION INTRAVENOUS at 16:55

## 2018-08-04 RX ADMIN — ENOXAPARIN SODIUM 40 MG: 40 INJECTION, SOLUTION INTRAVENOUS; SUBCUTANEOUS at 06:13

## 2018-08-04 RX ADMIN — OLANZAPINE 2.5 MG: 2.5 TABLET, FILM COATED ORAL at 18:13

## 2018-08-04 RX ADMIN — MEROPENEM 500 MG: 500 INJECTION, POWDER, FOR SOLUTION INTRAVENOUS at 23:10

## 2018-08-04 RX ADMIN — POTASSIUM & SODIUM PHOSPHATES POWDER PACK 280-160-250 MG 2 PACKET: 280-160-250 PACK at 20:46

## 2018-08-04 RX ADMIN — IPRATROPIUM BROMIDE AND ALBUTEROL SULFATE 3 ML: .5; 3 SOLUTION RESPIRATORY (INHALATION) at 13:28

## 2018-08-04 RX ADMIN — IPRATROPIUM BROMIDE AND ALBUTEROL SULFATE 3 ML: .5; 3 SOLUTION RESPIRATORY (INHALATION) at 20:22

## 2018-08-04 RX ADMIN — POTASSIUM & SODIUM PHOSPHATES POWDER PACK 280-160-250 MG 2 PACKET: 280-160-250 PACK at 16:26

## 2018-08-04 NOTE — PROGRESS NOTES
1930-Bedside verbal report received from Angie Lemus RN. Sbar, mar, labs, kardex and patient status reviewed. 1950-Assessment completed. No changes from previous assessment. Appears comfortable. Scrotum remains edematous, elevated with pillowcase. 0000-Assessment completed. Resting quietly, awakens easily and tracks with eyes around room. 0400-No changes from previous assessment. 0730-Bedside verbal report given to BUDDY Casillas Rn. Sbar, mar, labs, kardex and pt status reviewed.

## 2018-08-04 NOTE — PROGRESS NOTES
0730- Bedside and Verbal shift change report given to Katarzyna Simon RN (oncoming nurse) by Aron Linton RN (offgoing nurse). Report included the following information SBAR, Kardex, Intake/Output, MAR, Recent Results and Cardiac Rhythm SB/SR.  
 
0800- Initial shift assessment complete, NAD. Unable to assess orientation, patient has minimal verbal response. Titrating levophed as appropriate. Will continue to monitor. 1200- Reassessment complete, no changes. Will continue to monitor. Dr Gabby Izquierdo would like I/O totalled for night shift, completed this based off of what's documented in [facility]  
 
1500- Levophed stopped, patient tolerating well, will continue to monitor closely. 1600- Reassessment complete, no changes to previous. 1930- Bedside and Verbal shift change report given to Johann Disla RN (oncoming nurse) by Katarzyna Simon RN (offgoing nurse). Report included the following information SBAR, Kardex, MAR, Recent Results and Cardiac Rhythm SR/SB.

## 2018-08-04 NOTE — CONSULTS
Ritu Nunes M.D  [address] [place] [address]. Saturnino Kasandra [address] [facility] [place] [facility] [address]  Phone [phone] Fax [phone]  Pulmonary Critical Care & Sleep Medicine       Name: Aurora Rangel MRN: [id number]   : 1955 Hospital: [facility] [place]   Date: 2018        Critical Care Initial Patient Consult    Requesting MD:                                                  Reason for CC Consult:    IMPRESSION:   Patient Active Problem List   Diagnosis Code    Mental retardation F79    Down's syndrome Q90.9    Alzheimer's dementia G30.9, F02.80    HTN (hypertension) I10    HLD (hyperlipidemia) E78.5    GERD (gastroesophageal reflux disease) K21.9    Sepsis ([facility] A41.9    Acute UTI N39.0    TRANG (acute kidney injury) ([facility] N17.9 ·   GNR in blood   PLAN:  -- Check labs for today  -- GNR in blood in past radha was resistent to levaquin on meropenam and doing well -- Fever is improved -- Monitor and adjust per culture -- Continue vanco till strep sensitivity is back   -- IV fluids-- Decrease as generalized anasarca-- Give one dose albumin-- Scortal elevation-- DC sutton if able to   -- Get DVT study  -- Palliative eval appreciated  -- OT and PT eval  -- As BP improving will continue with femoral line and monitor patient    CVS:BP borderline taper levophed as tolerated Echo with normal EF  RS:Xray is ok, Aspiration precaution, Continue BD   ID:Follow culture and adjsut vanco 18, Meropenam 8/3/18, Repeated culture in am,  ENDO:SSI  GI:PPI   RENAL:Monitor electrolytes, Repeat labs from am is pending  CNS:Baseline status , Resume depakote, Zyprexa at night   HEMATOLOGY:no acute issue Monitor PLT with GNR sepsis  MUSCULOSKELETAL:no acute issue  PAIN AND SEDATION:prn meds  · Skin/Wound: no acute issue  · Electrolytes: Replace electrolytes per ICU electrolyte replacement protocol.    · IVF: NS at 75/hr  · Nutrition: diet per speech, Barium swallow ok  · Prophylaxis: DVT Prophylaxis with lovenox,. GI Prophylaxis. · Restraints: none  · PT/OT eval and treat. OOB when appropriate. · Lines/Tubes: none. Fish 8/2/18, Femoral central line placed by ER 8/3/18  ADVANCE DIRECTIVE:full code  FAMILY DISCUSSION:no one available today awaiting palliative follow up with family  Quality Care: PPI, DVT prophylaxis, HOB elevated, Infection control all reviewed and addressed. Events and notes from last 24 hours reviewed. Care plan discussed with nursing CC TIME:35  min    · Labs and images personally seen and available reports reviewed. · All current medicines are reviewed and doses and prescription adjusted. Subjective/History:  8/4/18  Slight imprvement as more awake and responsive  BP is slowly improving coming off pressors  Urin GNR Blood GNR and strep  Echo with normal EF  CT abd cystitis, No obstruction, RLL infillterate not impressed for it to be an infract. This is a 56-year-old patient who lives at [facility]. He has Down syndrome. He has dementia, history of seizures. He came to the hospital with fever that started last night. He was up to 103 at the assisted living facility, had 2 episodes of vomiting per the caregiver with him. They tried Tylenol via EMS en route to the hospital.  He had been eating, drinking and in his usual state of health up until yesterday. He was found to have evidence for UTI in the emergency room. Fish catheter is in place and I cannot tell if that was placed here or there. We will have to investigate and then ultimately he is going to receive a femoral line for continued fluid resuscitation as his blood pressures have been on the low normal side despite fluid resuscitation in the emergency room. He is going to be admitted to the ICU in the interim for sepsis with early shock. His PCP is Dr. Bibiana Sorensen.     Patient was brought to ICU BP is around 100 SYS  Right Groin femoral line placed by Er physician  Blood culture positive for GNR  Still spiking fever of 102       Prior to Admission medications    Medication Sig Start Date End Date Taking? Authorizing Provider   betamethasone valerate (VALISONE) 0.1 % ointment Apply  to affected area two (2) times a day. Yes Shaista Hurley MD   clindamycin (CLINDAGEL) 1 % topical gel Apply  to affected area two (2) times a day. use thin film on affected area   Yes Shaista Hurley MD   mineral oil-isopropyl myristat (EUCERIN) lotion Apply  to affected area as needed for Dry Skin. Yes Shaista Hurley MD   ketoconazole (NIZORAL) 2 % shampoo Apply  to affected area daily as needed for Itching. Yes Shaista Hurley MD   pneumococcal 13 john conj dip (PREVNAR 13, PF,) 0.5 mL syrg injection 0.5 mL by IntraMUSCular route PRIOR TO DISCHARGE. Yes Shaista Hurley MD   clotrimazole (LOTRIMIN) 1 % topical cream Apply  to affected area two (2) times a day. Yes Shaista Hurley MD   vitamin a & d (A&D) ointment Apply  to affected area as needed for Skin Irritation. Yes Shaista Hurley MD   LORazepam (ATIVAN) 1 mg tablet Take 1 Tab by mouth every four (4) hours as needed for Anxiety. Max Daily Amount: 6 mg. 7/3/17   Gerhardt Natter, MD   divalproex (DEPAKOTE SPRINKLE) 125 mg capsule Take 2 Caps by mouth two (2) times a day. 8/29/16   Edwina Pila, DO   pantoprazole (PROTONIX) 40 mg tablet Take 1 Tab by mouth two (2) times a day. 8/29/16   Edwina Morillo, DO   citalopram (CELEXA) 20 mg tablet Take  by mouth daily. Shaista Hurley MD   omega-3 fatty acids-vitamin e (FISH OIL) 1,000 mg cap Take 1 Cap by mouth. Shaista Hurley MD   fluticasone (FLONASE) 50 mcg/actuation nasal spray 2 Sprays by Both Nostrils route daily. Shaista Hurley MD   nutritional supplements (BROOKS) pack Take  by mouth. Shaista Hurley MD   risperiDONE (RISPERDAL) 1 mg tablet Take  by mouth daily. Shaista Hurley MD   acetaminophen (TYLENOL) 650 mg CR tablet Take 650 mg by mouth every six (6) hours as needed for Pain.     Shaista Hurley MD   bisacodyl (BISCOLAX) 10 mg suppository Insert 10 mg into rectum daily. Shaista Hurley MD   loperamide (IMMODIUM) 2 mg tablet Take 2 mg by mouth four (4) times daily as needed for Diarrhea. Shaista Hurley MD   methocarbamol (ROBAXIN) 500 mg tablet Take 500 mg by mouth four (4) times daily. Shaista Hurley MD   MAG HYDROX/AL HYDROX/SIMETH (MINTOX PO) Take 15 mL by mouth daily as needed. Shaista Hurley MD   naproxen (NAPROSYN) 500 mg tablet Take 500 mg by mouth two (2) times daily (with meals). Shaista Hurley MD   risperiDONE (RISPERDAL) 0.5 mg tablet Take 0.5 mg by mouth as needed. Shaista Hurley MD   PSEUDOEPHEDRINE-dextromethorphan-guaiFENesin (ROBAFEN CF) 30- mg/5 mL solution Take 5 mL by mouth every four (4) hours as needed for Cough.     Shaista Hurley MD     Current Facility-Administered Medications   Medication Dose Route Frequency    albumin human 25% (BUMINATE) solution 12.5 g  12.5 g IntraVENous ONCE    [START ON 8/5/2018] pantoprazole (PROTONIX) tablet 40 mg  40 mg Oral ACB    Vancomycin- Pharmacy to dose  1 Each Other Rx Dosing/Monitoring    vancomycin (VANCOCIN) 1250 mg in  ml infusion  1,250 mg IntraVENous Q24H    0.9% sodium chloride infusion  75 mL/hr IntraVENous CONTINUOUS    enoxaparin (LOVENOX) injection 40 mg  40 mg SubCUTAneous Q24H    citalopram (CELEXA) tablet 20 mg  20 mg Oral DAILY    divalproex (DEPAKOTE SPRINKLE) capsule 250 mg  250 mg Oral BID    NOREPINephrine (LEVOPHED) 8 mg in 5% dextrose 250mL infusion  2-16 mcg/min IntraVENous TITRATE    albuterol-ipratropium (DUO-NEB) 2.5 MG-0.5 MG/3 ML  3 mL Nebulization Q6H RT    meropenem (MERREM) 500 mg in sterile water (preservative free) 10 mL IV syringe  0.5 g IntraVENous Q8H    OLANZapine (ZyPREXA) tablet 2.5 mg  2.5 mg Oral QPM    insulin lispro (HUMALOG) injection   SubCUTAneous AC&HS          Objective:   Vital Signs:    Visit Vitals    /90    Pulse 67    Temp 100.4 °F (38 °C)    Resp 13    Ht 5' 2\" (1.575 m)    Wt 82.1 kg (181 lb)    SpO2 99%    BMI 33.11 kg/m2       O2 Device: Room air       Temp (24hrs), Av °F (37.2 °C), Min:96.9 °F (36.1 °C), Max:102.7 °F (39.3 °C)       Intake/Output:   Last shift:      701 - 1900  In: 60 [P.O.:60]  Out: -   Last 3 shifts: 1901 - 700  In: 150 [I.V.:150]  Out: 900 [Urine:900]    Intake/Output Summary (Last 24 hours) at 18 1230  Last data filed at 18 0905   Gross per 24 hour   Intake              210 ml   Output              900 ml   Net             -690 ml           Review of Systems:  Not able to get due to patient condition     Objective:    General: comfortable, no acute distress  HEENT: pupils reactive, sclera anicteric, EOM intact  Neck: No adenopathy or thyroid swelling, no lymphadenopathy or JVD, supple  CVS: S1S2 no murmurs  RS: Mod AE bilaterally, prolonged expiration with wheezing  Abd: soft, non tender, no hepatosplenomegaly  Neuro: non focal, awake, alert  Extrm: no leg edema, clubbing or cyanosis  Lymph node: No obvious palpable lymph node appreciated.   Skin: no rash  CBC w/Diff Recent Labs      18   01018   1520  18   2325   WBC  11.1  9.5  6.0   RBC  3.53*  3.30*  3.96*   HGB  11.8*  10.9*  13.2   HCT  36.2  33.9*  39.9   PLT  137  135  161   GRANS   --    --   75   LYMPH   --    --   15*   EOS   --    --   1        Chemistry Recent Labs      18   1200  18   0105  18   2300  18   1520  18   2325   GLU   --    --    --   124*  119*   NA   --    --    --   142  139   K   --   3.6   --   3.9  3.7   CL   --    --    --   111*  106   CO2   --    --    --   23  25   BUN   --    --    --   15  20*   CREA   --    --    --   1.44*  1.41*   CA   --    --    --   7.0*  8.2*   MG  2.2   --   2.6  1.6  1.7   PHOS  2.0*  2.3*   --   2.1*  1.3*   AGAP   --    --    --   8  8   BUCR   --    --    --   10*  14   AP   --    --    --   82  133*   TP   --    --    --   5.9*  7.1   ALB   --    --    --   1.9*  2.5*   GLOB   --    --    -- 4.0  4.6*   AGRAT   --    --    --   0.5*  0.5*        Lactic Acid Lactic acid   Date Value Ref Range Status   08/03/2018 1.5 0.4 - 2.0 MMOL/L Final     Recent Labs      08/03/18   0452  08/02/18   2325   LAC  1.5  3.0*        Micro  Recent Labs      08/04/18   0614  08/03/18   0110  08/02/18   2340   CULT  NO GROWTH AFTER 1 HOUR  NO GROWTH AFTER 1 HOUR  >100,000 COLONIES/mL GRAM NEGATIVE RODS*  11044 COLONIES/mL POSSIBLE 2ND GRAM NEGATIVE DMITRI*  ANAEROBIC BOTTLE GRAM NEGATIVE RODS*     Recent Labs      08/04/18   5241  08/03/18   0110  08/02/18   2340  08/02/18   2325   CULT  NO GROWTH AFTER 1 HOUR  NO GROWTH AFTER 1 HOUR  >100,000 COLONIES/mL GRAM NEGATIVE RODS*  89794 COLONIES/mL POSSIBLE 2ND GRAM NEGATIVE DMITRI*  ANAEROBIC BOTTLE GRAM NEGATIVE RODS*  ANAEROBIC BOTTLE GRAM NEGATIVE RODS*  ANAEROBIC BOTTLE POSSIBLE STREPTOCOCCI,NON HEMOLYTIC*        ABG Recent Labs      08/03/18   1527   FIO2I  21        Liver Enzymes Protein, total   Date Value Ref Range Status   08/03/2018 5.9 (L) 6.4 - 8.2 g/dL Final     Albumin   Date Value Ref Range Status   08/03/2018 1.9 (L) 3.4 - 5.0 g/dL Final     Globulin   Date Value Ref Range Status   08/03/2018 4.0 2.0 - 4.0 g/dL Final     A-G Ratio   Date Value Ref Range Status   08/03/2018 0.5 (L) 0.8 - 1.7   Final     AST (SGOT)   Date Value Ref Range Status   08/03/2018 21 15 - 37 U/L Final     Alk.  phosphatase   Date Value Ref Range Status   08/03/2018 82 45 - 117 U/L Final     Recent Labs      08/03/18   1520  08/02/18   2325   TP  5.9*  7.1   ALB  1.9*  2.5*   GLOB  4.0  4.6*   AGRAT  0.5*  0.5*   SGOT  21  29   AP  82  133*        Cardiac Enzymes No results found for: CPK, CK, CKMMB, CKMB, RCK3, CKMBT, CKNDX, CKND1, ALDAIR, TROPT, TROIQ, KAREN, TROPT, TNIPOC, BNP, BNPP     BNP No results found for: BNP, BNPP, XBNPT     Coagulation Recent Labs      08/03/18   1520   PTP  15.9*   INR  1.3*   APTT  32.1         Thyroid  Lab Results   Component Value Date/Time    TSH 3.20 08/03/2018 03:20 PM          Lipid Panel Lab Results   Component Value Date/Time    Cholesterol, total 140 05/12/2016 09:59 AM    HDL Cholesterol 46 05/12/2016 09:59 AM    LDL, calculated 75.8 05/12/2016 09:59 AM    VLDL, calculated 18.2 05/12/2016 09:59 AM    Triglyceride 91 05/12/2016 09:59 AM    CHOL/HDL Ratio 3.0 05/12/2016 09:59 AM          Urinalysis Lab Results   Component Value Date/Time    Color YELLOW 08/03/2018 01:10 AM    Appearance CLOUDY 08/03/2018 01:10 AM    Specific gravity 1.017 08/03/2018 01:10 AM    pH (UA) 6.5 08/03/2018 01:10 AM    Protein 30 (A) 08/03/2018 01:10 AM    Glucose NEGATIVE  08/03/2018 01:10 AM    Ketone NEGATIVE  08/03/2018 01:10 AM    Bilirubin NEGATIVE  08/03/2018 01:10 AM    Urobilinogen 1.0 08/03/2018 01:10 AM    Nitrites POSITIVE (A) 08/03/2018 01:10 AM    Leukocyte Esterase LARGE (A) 08/03/2018 01:10 AM    Epithelial cells FEW 08/03/2018 01:10 AM    Bacteria 4+ (A) 08/03/2018 01:10 AM    WBC TOO NUMEROUS TO COUNT 08/03/2018 01:10 AM    RBC 2 to 5 08/03/2018 01:10 AM        XR (Most Recent). CXR reviewed by me and compared with previous CXR   Results from Hospital Encounter encounter on 08/02/18   XR CHEST PORT   Narrative Frontal chest radiograph    Comparison: 8/3/2018    Indication: Hypoxia. Findings: Normal appearance of heart and mediastinum. Pleural spaces appear  clear. No abnormal pulmonary opacities. No acute osseous abnormality. Impression IMPRESSION:     No acute findings in the chest.         CT (Most Recent)   Results from Hospital Encounter encounter on 08/02/18   CT ABD PELV WO CONT   Narrative EXAM: CT of the abdomen and pelvis    INDICATION: Sepsis. Fevers. COMPARISON: 6/28/2017    TECHNIQUE: Axial CT imaging of the abdomen and pelvis was performed without  intravenous contrast. Multiplanar reformats were generated.     DOSE REDUCTION:  One or more dose reduction techniques were used on this CT:  automated exposure control, adjustment of the mAs and/or kVp according to  patient's size, and iterative reconstruction techniques. The specific techniques  utilized on this CT exam have been documented in the patient's electronic  medical record.    _______________    FINDINGS:    LOWER CHEST: Small focal area of consolidation is present within the right lower  lobe. Given the wedge-shaped appearance of this consolidation, this could be  related to either infection or infarct from pulmonary embolism. Mild edema is  present within both lower lobes. Trace pleural effusion. LIVER, BILIARY: Mild hepatic steatosis is present. The unenhanced liver is  otherwise unremarkable. No biliary dilation. Cholelithiasis is present. PANCREAS: Normal.    SPLEEN: Normal.    KIDNEYS/URETERS/BLADDER: A 0.2 cm nonobstructive right renal calculus is  present. The unenhanced kidneys are otherwise unremarkable. There is no  hydronephrosis. Significant stranding is present surrounding the bladder,  concerning for cystitis. Diffuse bladder wall thickening is present. A Fish  catheter is in place. Diffuse stranding from the bladder extends throughout the  lower pelvis. ADRENALS: Normal.    GASTROINTESTINAL TRACT: Mild wall thickening is present within the rectum, which  could be suggestive of mild proctitis or reactive inflammatory changes from the  inflamed bladder. The small bowel and colon are otherwise normal in caliber and  wall thickness. Mild stranding surrounding the appendix is likely reactive from  the diffuse inflammatory changes which are present from the bladder. There is no  appendix dilation or wall thickening. MESENTERY/PERITONEUM: No lymphadenopathy. RETROPERITONEUM: No lymphadenopathy. PELVIC ORGANS: Unremarkable. VASCULATURE: Unremarkable  right common femoral catheter tip is in the right  common iliac vein. BONES: No acute or aggressive osseous abnormalities identified. Stranding is  present surrounding both hips.  Postsurgical changes are present in the right  hip. OTHER: None.    _______________         Impression IMPRESSION:    1. Diffuse wall thickening with surrounding stranding is present surrounding  the bladder, concerning for cystitis. There is also mild wall thickening  surrounding the rectum, which is likely reactive from inflammatory changes in  the bladder, however proctitis excluded. Small bowel and colon are otherwise  normal in caliber and wall thickness. 2.  Small wedge-shaped area of consolidation is present within the right lower  lobe, either related to infection or infarct from a pulmonary embolism. EKG No results found for this or any previous visit. ECHO · Estimated left ventricular ejection fraction is 56 - 60%. Poor resolution of endocardial border. Cannot comment on wall motion abnormality. Mild (grade 1) left ventricular diastolic dysfunction. · This study has limited diagnostic value.         Imaging:  I have personally reviewed the patients radiographs and have reviewed the reports:     Best practice :  Lactic acid ordered- initial and repeat Q6hrs if elevated till normalized. Cultures drawn. Antibiotic administered within 1hr-ICU  And 3hrs ED  Pressors aim MAP >65mmHg  Glycemic control  Sress ulcer prophylaxis  DVT prophylaxis           Juan A Euceda M.D.   Pulmonary Critical Care & Sleep Medicine

## 2018-08-04 NOTE — PROGRESS NOTES
Hospitalist Progress Note Patient: Whit Edmondson MRN: [id number]  CSN: [id number] YOB: 1955  Age: 65 y.o. Sex: male DOA: 8/2/2018 LOS:  LOS: 1 day Chief Complaint:  Sepsis due to UTI Assessment/Plan Disposition : 
Patient Active Problem List  
Diagnosis Code  Mental retardation F79  Down's syndrome Q90.9  Alzheimer's dementia G30.9, F02.80  
 HTN (hypertension) I10  
 HLD (hyperlipidemia) E78.5  GERD (gastroesophageal reflux disease) K21.9  Sepsis ([facility] A41.9  Acute UTI N39.0  [facility] N17.9 Sepsis due to urinary tract infection. -off of pressors; stable BP. 
-Continue current abx (Merrem and Vanc) TRANG; resolved with hydration GERD: continue PPI 
 
DVT prophylaxis: 
 
 
Subjective: No events over night. Patient doing well this AM.  No new complaints. Review of systems: 
 
Constitutional: denies fevers, chills, myalgias Respiratory: denies SOB, cough Cardiovascular: denies chest pain, palpitations Gastrointestinal: denies nausea, vomiting, diarrhea Vital signs/Intake and Output: 
Visit Vitals  /51  Pulse (!) 48  Temp 96.9 °F (36.1 °C)  Resp 18  Ht 5' 2\" (1.575 m)  Wt 82.1 kg (181 lb)  SpO2 100%  BMI 33.11 kg/m2 Current Shift:    
Last three shifts:  08/02 1901 - 08/04 0700 In: 150 [I.V.:150] Out: 900 [Urine:900] Exam: 
 
General: cw Downs syndrome, alert, moaning in bed Head/Neck: NCAT, supple, No masses, No lymphadenopathy CVS:Regular rate and rhythm, no M/R/G, S1/S2 heard, no thrill Lungs:Clear to auscultation bilaterally, no wheezes, rhonchi, or rales Abdomen: Soft, Nontender, No distention, Normal Bowel sounds, No hepatomegaly Extremities: No C/C/E, pulses palpable 2+ Skin:normal texture and turgor, no rashes, no lesions Neuro:cw known MR; does not follow commands Psych:cw known MR and Down syndrome with Alz Dz Labs: Results: Chemistry Recent Labs 08/04/18 
 0105  08/03/18 
 1520  08/02/18 
 2325 GLU   --   124*  119* NA   --   142  139  
K  3.6  3.9  3.7 CL   --   111*  106 CO2   --   23  25 BUN   --   15  20* CREA   --   1.44*  1.41* CA   --   7.0*  8.2* AGAP   --   8  8 BUCR   --   10*  14  
AP   --   82  133* TP   --   5.9*  7.1 ALB   --   1.9*  2.5*  
GLOB   --   4.0  4.6* AGRAT   --   0.5*  0.5* CBC w/Diff Recent Labs 08/04/18 
 0105  08/03/18 
 1520  08/02/18 
 2325 WBC  11.1  9.5  6.0  
RBC  3.53*  3.30*  3.96* HGB  11.8*  10.9*  13.2 HCT  36.2  33.9*  39.9 PLT  137  135  161 GRANS   --    --   75  
LYMPH   --    --   15* EOS   --    --   1 Cardiac Enzymes No results for input(s): CPK, CKND1, ALDAIR in the last 72 hours. No lab exists for component: [address] Coagulation Recent Labs 08/03/18 
 1520 PTP  15.9* INR  1.3* APTT  32.1 Lipid Panel Lab Results Component Value Date/Time Cholesterol, total 140 05/12/2016 09:59 AM  
 HDL Cholesterol 46 05/12/2016 09:59 AM  
 LDL, calculated 75.8 05/12/2016 09:59 AM  
 VLDL, calculated 18.2 05/12/2016 09:59 AM  
 Triglyceride 91 05/12/2016 09:59 AM  
 CHOL/HDL Ratio 3.0 05/12/2016 09:59 AM  
  
BNP No results for input(s): BNPP in the last 72 hours. Liver Enzymes Recent Labs 08/03/18 
 1520 TP  5.9* ALB  1.9* AP  82 SGOT  21 Thyroid Studies Lab Results Component Value Date/Time TSH 3.20 08/03/2018 03:20 PM  
    
Procedures/imaging: see electronic medical records for all procedures/Xrays and details which were not copied into this note but were reviewed prior to creation of Plan Jerome Herring MD

## 2018-08-04 NOTE — PROGRESS NOTES
Problem: Mobility Impaired (Adult and Pediatric) Goal: *Acute Goals and Plan of Care (Insert Text) Physical Therapy Goals Initiated 8/4/2018 and to be accomplished within 3-7 day(s) 1. Patient will move from supine to sit and sit to supine  in bed with moderate assistance . 2.  Patient will transfer from bed to chair and chair to bed with moderate assistance  using the least restrictive device. 3.  Patient will perform sit to stand with moderate assistance . physical Therapy EVALUATION Patient: Natty Lainez (19 y.o. male) Date: 8/4/2018 Primary Diagnosis: Sepsis ([facility] [address] Acute UTI TRANG (acute kidney injury) ([facility] [address] Sepsis ([facility] [address] Acute UTI Precautions:   Fall ASSESSMENT : 
Based on the objective data described below, the patient presents with lower extremity weakness, impaired bed mobility, and overall limitations in functional mobility. Per chart review pt lives in group home and propels his w/c with LE. Tone noted in B LE with mobility. Pt performed supine to sit with totalA, pt with facial grimace and moan with attempt at mobility. Pt non verbal throughout session. Pt with no command following throughout session. Patient will be placed on 3 day trial to assess appropriateness for PT services and ability to progress towards goals. Patient will benefit from skilled intervention to address the above impairments. Patients rehabilitation potential is considered to be Guarded Factors which may influence rehabilitation potential include:  
[]         None noted 
[x]         Mental ability/status [x]         Medical condition 
[x]         Home/family situation and support systems 
[x]         Safety awareness [x]         Pain tolerance/management 
[]         Other: PLAN : 
Recommendations and Planned Interventions: 
[x]           Bed Mobility Training             [x]    Neuromuscular Re-Education 
[x]           Transfer Training                   []    Orthotic/Prosthetic Training 
[x] Gait Training                          []    Modalities [x]           Therapeutic Exercises          []    Edema Management/Control 
[x]           Therapeutic Activities            [x]    Patient and Family Training/Education 
[]           Other (comment): Frequency/Duration: Patient will be followed by physical therapy 1-2 times per day for 3 day trial. 
Discharge Recommendations: LTC Further Equipment Recommendations for Discharge: TBD SUBJECTIVE:  
Patient stated-none OBJECTIVE DATA SUMMARY:  
 
Past Medical History:  
Diagnosis Date  Alzheimer's dementia  Down's syndrome  Essential hypertension  Seizure ([facility] [address] History reviewed. No pertinent surgical history. Barriers to Learning/Limitations: yes;  cognitive and altered mental status (i.e.Sedation, Confusion) Compensate with: Other no ability to compensate Prior Level of Function/Home Situation: Unknown Home Situation Home Environment: Group home # Steps to Enter: 0 One/Two Story Residence: One story Living Alone: No 
Support Systems:  (Group home) Patient Expects to be Discharged to[de-identified] Unknown Current DME Used/Available at Home: None Strength:   
Strength: Grossly decreased, non-functional 
Tone & Sensation:  
Tone: Abnormal 
Sensation: Impaired Range Of Motion: 
AROM: Grossly decreased, non-functional 
PROM: Grossly decreased, non-functional 
Functional Mobility: 
Bed Mobility: 
Supine to Sit: Total assistance Sit to Supine: Total assistance Balance:  
Sitting: Impaired; With support Sitting - Static: Poor (constant support) Sitting - Dynamic: Poor (constant support) Pain: 
Pain Scale 1: Adult Nonverbal Pain Scale Pain Intensity 1: 0 Activity Tolerance:  
Good Please refer to the flowsheet for vital signs taken during this treatment.  
After treatment:  
[]         Patient left in no apparent distress sitting up in chair 
[x]         Patient left in no apparent distress in bed 
[x]         Call bell left within reach [x]         Nursing notified 
[]         Caregiver present 
[]         Bed alarm activated COMMUNICATION/EDUCATION:  
[x]         Fall prevention education was provided and the patient/caregiver indicated understanding. [x]         Patient/family have participated as able in goal setting and plan of care. [x]         Patient/family agree to work toward stated goals and plan of care. []         Patient understands intent and goals of therapy, but is neutral about his/her participation. []         Patient is unable to participate in goal setting and plan of care. Thank you for this referral. 
Jessica Terrell Time Calculation: 10 mins Eval Complexity: History: MEDIUM  Complexity : 1-2 comorbidities / personal factors will impact the outcome/ POC Exam:MEDIUM Complexity : 3 Standardized tests and measures addressing body structure, function, activity limitation and / or participation in recreation  Presentation: MEDIUM Complexity : Evolving with changing characteristics  Clinical Decision Making:Medium Complexity totalA for mobility, not following commands, nonverbal Overall Complexity:MEDIUM Mobility  Current  CM= 80-99%   Goal  CK= 40-59%. The severity rating is based on the Level of Assistance required for Functional Mobility and ADLs.

## 2018-08-04 NOTE — PROGRESS NOTES
INITIAL NUTRITION ASSESSMENT 
  
RECOMMENDATIONS/PLAN:  
Continue w/ POC Monitor labs/lytes, PO intakes, skin integrity, wt, fluid status, BM 
 
REASON FOR ASSESSMENT:  
 
[]  RN Referral:  
 [x] MST score >/=2 Malnutrition Screening Tool (MST): 
Recently Lost Weight Without Trying: No 
If Yes, How Much Weight Loss: Unsure Eating Poorly Due to Decreased Appetite:  (unable to answer) MST Score: 2 NUTRITION ASSESSMENT:  
Client History: 65 yrs old Male admitted with UTI, sepsis, TRANG. SLP following pt PMHx: alzheimer's disease, downs syndrome, seizure Cultural/Mormon Food Preferences: None Identified FOOD/NUTRITION HISTORY Diet History: unable to obtain at this time Food Allergies:  [x] NKFA Pertinent PTA Medications: protonix, biscolax, vit a & d,  
 
NUTRITION INTAKE Diet Order:  [facility] Nectar thick Average PO intake:      
Patient Vitals for the past 100 hrs: 
 % Diet Eaten 08/04/18 0905 25 % Pertinent Medications:  [x] Reviewed; Electrolyte Replacement Protocol: [x]K  [x]Mg  [x]PO4 Insulin:  [x] SSI  [] Pre-meal   []  Basal   [] Drip  [] None Pt expected to meet estimated nutrient needs through next review:          [x]  Yes     [] No;  ANTHROPOMETRICS Height: 5' 2\" (157.5 cm)       Weight: 82.1 kg (181 lb) BMI: 33.1 kg/m^2  -  obese (30%-39.9% BMI) Weight change: no wt loss noted in chart hx Comparison to Reference Standards: IBW: 118 lbs      %IBW: 153%      AdjBW: 60.8 kg NUTRITION-FOCUSED PHYSICAL ASSESSMENT Skin: No PU   
GI: +BM 8/2/18 BIOCHEMICAL DATA & MEDICAL TESTS Pertinent Labs:  [x] Reviewed; phos-2.3 ionized calcium 1.10 NUTRITION PRESCRIPTION Calories: 2183 kcal/day based on [place] x 1.2 x 1.3 Protein: 64 g/day based on 1.2 g/kg of IBW 
CHO: 273 g/day based on 50% of total energy Fluid: 2183 ml/day based on 1 kcal/ml NUTRITION DIAGNOSES: 1. Predicted suboptimal energy intake related to swallowing difficulties as evidence by need for modify diet NUTRITION INTERVENTIONS:  
INTERVENTIONS:        GOALS: 
1. Other: Continue w/ POC 1. Encourag PO intake >50% at most meals  by next review 3 days LEARNING NEEDS (Diet, Supplementation, Food/Nutrient-Drug Interaction):  
[x] None Identified 
[] Inpatient education provided/documented   
[] Identified and patient:  [] Declined     [] Was not appropriate/indicated NUTRITION MONITORING /EVALUATION:  
Follow PO intake Monitor wt Monitor renal labs, electrolytes, fluid status Monitor for additional supplement needs 
 
[] Participated in Interdisciplinary Rounds 
[x] [address] [place] [address] Reviewed/Documented DISCHARGE NUTRITION RECOMMENDATIONS ADDRESSED:  
   
  [x] To be determined closer to discharge NUTRITION RISK:     [x]  At risk                     []  Not currently at risk Will follow-up per policy. Zia Haynes, [address] [facility]

## 2018-08-05 LAB
ALBUMIN SERPL-MCNC: 2.1 G/DL (ref 3.4–5)
ALBUMIN/GLOB SERPL: 0.6 {RATIO} (ref 0.8–1.7)
ALP SERPL-CCNC: 74 U/L (ref 45–117)
ALT SERPL-CCNC: 27 U/L (ref 16–61)
ANION GAP SERPL CALC-SCNC: 4 MMOL/L (ref 3–18)
AST SERPL-CCNC: 23 U/L (ref 15–37)
BACTERIA SPEC CULT: ABNORMAL
BILIRUB SERPL-MCNC: 0.4 MG/DL (ref 0.2–1)
BUN SERPL-MCNC: 7 MG/DL (ref 7–18)
BUN/CREAT SERPL: 7 (ref 12–20)
CA-I SERPL-SCNC: 1.1 MMOL/L (ref 1.12–1.32)
CALCIUM SERPL-MCNC: 7.5 MG/DL (ref 8.5–10.1)
CHLORIDE SERPL-SCNC: 113 MMOL/L (ref 100–108)
CO2 SERPL-SCNC: 26 MMOL/L (ref 21–32)
CREAT SERPL-MCNC: 1.07 MG/DL (ref 0.6–1.3)
ERYTHROCYTE [DISTWIDTH] IN BLOOD BY AUTOMATED COUNT: 14.1 % (ref 11.6–14.5)
GLOBULIN SER CALC-MCNC: 3.8 G/DL (ref 2–4)
GLUCOSE BLD STRIP.AUTO-MCNC: 100 MG/DL (ref 70–110)
GLUCOSE BLD STRIP.AUTO-MCNC: 111 MG/DL (ref 70–110)
GLUCOSE BLD STRIP.AUTO-MCNC: 86 MG/DL (ref 70–110)
GLUCOSE BLD STRIP.AUTO-MCNC: 94 MG/DL (ref 70–110)
GLUCOSE SERPL-MCNC: 92 MG/DL (ref 74–99)
GRAM STN SPEC: ABNORMAL
HCT VFR BLD AUTO: 32.4 % (ref 36–48)
HGB BLD-MCNC: 10.5 G/DL (ref 13–16)
MAGNESIUM SERPL-MCNC: 2 MG/DL (ref 1.6–2.6)
MCH RBC QN AUTO: 32.9 PG (ref 24–34)
MCHC RBC AUTO-ENTMCNC: 32.4 G/DL (ref 31–37)
MCV RBC AUTO: 101.6 FL (ref 74–97)
PHOSPHATE SERPL-MCNC: 3.1 MG/DL (ref 2.5–4.9)
PLATELET # BLD AUTO: 130 K/UL (ref 135–420)
PMV BLD AUTO: 11.4 FL (ref 9.2–11.8)
POTASSIUM SERPL-SCNC: 4 MMOL/L (ref 3.5–5.5)
PROT SERPL-MCNC: 5.9 G/DL (ref 6.4–8.2)
RBC # BLD AUTO: 3.19 M/UL (ref 4.7–5.5)
SERVICE CMNT-IMP: ABNORMAL
SERVICE CMNT-IMP: ABNORMAL
SODIUM SERPL-SCNC: 143 MMOL/L (ref 136–145)
WBC # BLD AUTO: 7.3 K/UL (ref 4.6–13.2)

## 2018-08-05 PROCEDURE — 80053 COMPREHEN METABOLIC PANEL: CPT | Performed by: INTERNAL MEDICINE

## 2018-08-05 PROCEDURE — 74011250636 HC RX REV CODE- 250/636: Performed by: HOSPITALIST

## 2018-08-05 PROCEDURE — 74011000250 HC RX REV CODE- 250: Performed by: INTERNAL MEDICINE

## 2018-08-05 PROCEDURE — 97530 THERAPEUTIC ACTIVITIES: CPT

## 2018-08-05 PROCEDURE — 82962 GLUCOSE BLOOD TEST: CPT

## 2018-08-05 PROCEDURE — 65660000000 HC RM CCU STEPDOWN

## 2018-08-05 PROCEDURE — 83735 ASSAY OF MAGNESIUM: CPT | Performed by: INTERNAL MEDICINE

## 2018-08-05 PROCEDURE — 94640 AIRWAY INHALATION TREATMENT: CPT

## 2018-08-05 PROCEDURE — 51798 US URINE CAPACITY MEASURE: CPT

## 2018-08-05 PROCEDURE — A4216 STERILE WATER/SALINE, 10 ML: HCPCS | Performed by: INTERNAL MEDICINE

## 2018-08-05 PROCEDURE — 74011250637 HC RX REV CODE- 250/637: Performed by: HOSPITALIST

## 2018-08-05 PROCEDURE — 74011250636 HC RX REV CODE- 250/636: Performed by: INTERNAL MEDICINE

## 2018-08-05 PROCEDURE — 74011250636 HC RX REV CODE- 250/636: Performed by: EMERGENCY MEDICINE

## 2018-08-05 PROCEDURE — 85027 COMPLETE CBC AUTOMATED: CPT | Performed by: INTERNAL MEDICINE

## 2018-08-05 PROCEDURE — 74011250637 HC RX REV CODE- 250/637: Performed by: INTERNAL MEDICINE

## 2018-08-05 PROCEDURE — 84100 ASSAY OF PHOSPHORUS: CPT | Performed by: INTERNAL MEDICINE

## 2018-08-05 PROCEDURE — 82330 ASSAY OF CALCIUM: CPT | Performed by: INTERNAL MEDICINE

## 2018-08-05 RX ORDER — FUROSEMIDE 10 MG/ML
20 INJECTION INTRAMUSCULAR; INTRAVENOUS ONCE
Status: COMPLETED | OUTPATIENT
Start: 2018-08-05 | End: 2018-08-05

## 2018-08-05 RX ORDER — LEVOFLOXACIN 5 MG/ML
750 INJECTION, SOLUTION INTRAVENOUS EVERY 24 HOURS
Status: DISCONTINUED | OUTPATIENT
Start: 2018-08-05 | End: 2018-08-06

## 2018-08-05 RX ADMIN — IPRATROPIUM BROMIDE AND ALBUTEROL SULFATE 3 ML: .5; 3 SOLUTION RESPIRATORY (INHALATION) at 08:06

## 2018-08-05 RX ADMIN — PANTOPRAZOLE SODIUM 40 MG: 40 TABLET, DELAYED RELEASE ORAL at 09:11

## 2018-08-05 RX ADMIN — IPRATROPIUM BROMIDE AND ALBUTEROL SULFATE 3 ML: .5; 3 SOLUTION RESPIRATORY (INHALATION) at 00:43

## 2018-08-05 RX ADMIN — DIVALPROEX SODIUM 250 MG: 125 CAPSULE, COATED PELLETS ORAL at 23:01

## 2018-08-05 RX ADMIN — OLANZAPINE 2.5 MG: 2.5 TABLET, FILM COATED ORAL at 18:34

## 2018-08-05 RX ADMIN — FUROSEMIDE 20 MG: 10 INJECTION, SOLUTION INTRAMUSCULAR; INTRAVENOUS at 15:05

## 2018-08-05 RX ADMIN — LEVOFLOXACIN 750 MG: 5 INJECTION, SOLUTION INTRAVENOUS at 15:05

## 2018-08-05 RX ADMIN — IPRATROPIUM BROMIDE AND ALBUTEROL SULFATE 3 ML: .5; 3 SOLUTION RESPIRATORY (INHALATION) at 14:40

## 2018-08-05 RX ADMIN — DIVALPROEX SODIUM 250 MG: 125 CAPSULE, COATED PELLETS ORAL at 09:11

## 2018-08-05 RX ADMIN — MEROPENEM 500 MG: 500 INJECTION, POWDER, FOR SOLUTION INTRAVENOUS at 09:16

## 2018-08-05 RX ADMIN — ENOXAPARIN SODIUM 40 MG: 40 INJECTION, SOLUTION INTRAVENOUS; SUBCUTANEOUS at 06:40

## 2018-08-05 RX ADMIN — CITALOPRAM HYDROBROMIDE 20 MG: 20 TABLET ORAL at 09:11

## 2018-08-05 RX ADMIN — VANCOMYCIN HYDROCHLORIDE 1250 MG: 10 INJECTION, POWDER, LYOPHILIZED, FOR SOLUTION INTRAVENOUS at 06:40

## 2018-08-05 RX ADMIN — IPRATROPIUM BROMIDE AND ALBUTEROL SULFATE 3 ML: .5; 3 SOLUTION RESPIRATORY (INHALATION) at 20:57

## 2018-08-05 NOTE — PROGRESS NOTES
Problem: Mobility Impaired (Adult and Pediatric)  Goal: *Acute Goals and Plan of Care (Insert Text)  Physical Therapy Goals  Initiated 8/4/2018 and to be accomplished within 3-7 day(s)  1. Patient will move from supine to sit and sit to supine  in bed with moderate assistance . 2.  Patient will transfer from bed to chair and chair to bed with moderate assistance  using the least restrictive device. 3.  Patient will perform sit to stand with moderate assistance . physical Therapy TREATMENT    Patient: Perez Zapien (35 y.o. male)  Date: 8/5/2018  Diagnosis: Sepsis ([facility] [address]  Acute UTI  TRANG (acute kidney injury) ([facility] [address]  Sepsis ([facility] [address]  Acute UTI Sepsis [facility])  Precautions: Fall   Chart, physical therapy assessment, plan of care and goals were reviewed. ASSESSMENT:  Nursing staff performing hygeine upon entering. Pt requires totalA for rolling. Pt requires totalA for supine to/from sit, poor sitting balance. Performed PROM/stretchingAAROM to LE and UE. Pt resisting strongly intermittently. Pt still not following commands. However doesn't appear to be in all much pain as yesterday. Pt following PT with eyes and head today. Looks up when he hears his name. Will continue to attempt to progress pt. No family/caregivers have been in room to attempt to assess PLOF. Progression toward goals:  []      Improving appropriately and progressing toward goals  []      Improving slowly and progressing toward goals  [x]      Not making progress toward goals     PLAN:  Patient continues to benefit from skilled intervention to address the above impairments. Continue treatment per established plan of care.   Discharge Recommendations:  LTC  Further Equipment Recommendations for Discharge:  TBD     SUBJECTIVE:   Patient stated- none    OBJECTIVE DATA SUMMARY:   Critical Behavior:  Neurologic State: Eyes open spontaneously  Orientation Level: Unable to verbalize  Cognition: Decreased command following  Safety/Judgement: Not assessed  Functional Mobility Training:  Bed Mobility:  Rolling: Total assistance  Supine to Sit: Total assistance  Sit to Supine: Total assistance  Balance:  Sitting: Impaired; With support  Sitting - Static: Poor (constant support)  Pain:  Pain Scale 1: Adult Nonverbal Pain Scale  Activity Tolerance:   Fair  Please refer to the flowsheet for vital signs taken during this treatment.   After treatment:   [] Patient left in no apparent distress sitting up in chair  [x] Patient left in no apparent distress in bed  [x] Call bell left within reach  [x] Nursing notified  [] Caregiver present  [] Bed alarm activated      Checo Hansen   Time Calculation: 15 mins

## 2018-08-05 NOTE — PROGRESS NOTES
0730-Bedside and Verbal shift change report given to La Flynn RN (oncoming nurse) by Trung Tripathi RN (offgoing nurse). Report included the following information SBAR, Kardex, Intake/Output, MAR, Recent Results and Cardiac Rhythm SB/SR.     0800- Initial shift assessment complete, NAD. Will continue to monitor. 1200- Reassessment complete, no changes. Removed femoral line, held pressure, no bleeding/hematoma noted, guaze and Tegaderm applied to site. 1345- TRANSFER - OUT REPORT:    Verbal report given to EDWARDO RN(name) on Lars Richard  being transferred to (unit) for routine progression of care       Report consisted of patients Situation, Background, Assessment and   Recommendations(SBAR). Information from the following report(s) SBAR, Kardex, Intake/Output and Cardiac Rhythm SR was reviewed with the receiving nurse. Lines:   Peripheral IV 08/02/18 Left Wrist (Active)   Site Assessment Clean, dry, & intact 8/5/2018  1:50 PM   Phlebitis Assessment 0 8/5/2018  1:50 PM   Infiltration Assessment 0 8/5/2018  1:50 PM   Dressing Status Clean, dry, & intact 8/5/2018  1:50 PM   Dressing Type Tape;Transparent 8/5/2018  1:50 PM   Hub Color/Line Status Capped;Pink 8/5/2018  1:50 PM   Action Taken Open ports on tubing capped 8/5/2018  1:50 PM   Alcohol Cap Used Yes 8/5/2018  1:50 PM        Opportunity for questions and clarification was provided.       Patient transported with:   Monitor  Registered Nurse

## 2018-08-05 NOTE — PROGRESS NOTES
Hospitalist Progress Note    Patient: Safia Moore MRN: [id number]  CSN: [id number]    YOB: 1955  Age: 65 y.o. Sex: male    DOA: 8/2/2018 LOS:  LOS: 2 days          Chief Complaint:  Sepsis due to UTI          Assessment/Plan     Disposition :  Patient Active Problem List   Diagnosis Code    Mental retardation F79    Down's syndrome Q90.9    Alzheimer's dementia G30.9, F02.80    HTN (hypertension) I10    HLD (hyperlipidemia) E78.5    GERD (gastroesophageal reflux disease) K21.9    Sepsis ([facility] [address] A41.9    Acute UTI N39.0    TRANG (acute kidney injury) ([facility] [address] N17.9     Sepsis due to urinary tract infection. -off of pressors; stable BP.  -Continue current abx (Merrem and Vanc)  -Plt down slightly this AM; monitor    TRANG; resolved with hydration    GERD: continue PPI    DVT prophylaxis:      Subjective:    Nonverbal patient. No events over night. Patient doing well this AM.  No new complaints.     Review of systems:    Unable to answer question due to severe MR      Vital signs/Intake and Output:  Visit Vitals    /74 (BP 1 Location: Right arm, BP Patient Position: At rest)    Pulse 70    Temp 100.3 °F (37.9 °C)    Resp 19    Ht 5' 2\" (1.575 m)    Wt 82.1 kg (181 lb)    SpO2 93%    BMI 33.11 kg/m2     Current Shift:     Last three shifts:  08/03 1901 - 08/05 0700  In: 4137.8 [P.O.:60; I.V.:4077.8]  Out: 1800 [Urine:1800]    Exam:    General: cw Downs syndrome, alert, moaning in bed  Head/Neck: NCAT, supple, No masses, No lymphadenopathy  CVS:Regular rate and rhythm, no M/R/G, S1/S2 heard, no thrill  Lungs:Clear to auscultation bilaterally, no wheezes, rhonchi, or rales  Abdomen: Soft, Nontender, No distention, Normal Bowel sounds, No hepatomegaly  Extremities: No C/C/E, pulses palpable 2+  Skin:normal texture and turgor, no rashes, no lesions  Neuro:cw known MR; does not follow commands  Psych:cw known MR and Down syndrome with Alz Dz                Labs: Results:       Chemistry Recent Labs      08/05/18   0350  08/04/18   1200  08/04/18   0105  08/03/18   1520   GLU  92  134*   --   124*   NA  143  143   --   142   K  4.0  4.3  3.6  3.9   CL  113*  112*   --   111*   CO2  26  24   --   23   BUN  7  10   --   15   CREA  1.07  1.06   --   1.44*   CA  7.5*  7.9*   --   7.0*   AGAP  4  7   --   8   BUCR  7*  9*   --   10*   AP  74  78   --   82   TP  5.9*  6.1*   --   5.9*   ALB  2.1*  2.0*   --   1.9*   GLOB  3.8  4.1*   --   4.0   AGRAT  0.6*  0.5*   --   0.5*      CBC w/Diff Recent Labs      08/05/18   0350  08/04/18   0105  08/03/18   1520  08/02/18   2325   WBC  7.3  11.1  9.5  6.0   RBC  3.19*  3.53*  3.30*  3.96*   HGB  10.5*  11.8*  10.9*  13.2   HCT  32.4*  36.2  33.9*  39.9   PLT  130*  137  135  161   GRANS   --    --    --   75   LYMPH   --    --    --   15*   EOS   --    --    --   1      Cardiac Enzymes No results for input(s): CPK, CKND1, ALDAIR in the last 72 hours. No lab exists for component: CKRMB, TROIP   Coagulation Recent Labs      08/03/18   1520   PTP  15.9*   INR  1.3*   APTT  32.1       Lipid Panel Lab Results   Component Value Date/Time    Cholesterol, total 140 05/12/2016 09:59 AM    HDL Cholesterol 46 05/12/2016 09:59 AM    LDL, calculated 75.8 05/12/2016 09:59 AM    VLDL, calculated 18.2 05/12/2016 09:59 AM    Triglyceride 91 05/12/2016 09:59 AM    CHOL/HDL Ratio 3.0 05/12/2016 09:59 AM      BNP No results for input(s): BNPP in the last 72 hours.    Liver Enzymes Recent Labs      08/05/18   0350   TP  5.9*   ALB  2.1*   AP  74   SGOT  23      Thyroid Studies Lab Results   Component Value Date/Time    TSH 3.20 08/03/2018 03:20 PM        Procedures/imaging: see electronic medical records for all procedures/Xrays and details which were not copied into this note but were reviewed prior to creation of Nona Osman MD

## 2018-08-05 NOTE — PROGRESS NOTES
[address]INA [address] [place] [address] [address]  Phone [phone] Fax [phone]  Pulmonary Critical Care & Sleep Medicine       Name: Safia Moore MRN: [id number]   : 1955 Hospital: [facility] [place]   Date: 2018        PCCM Follow UP     Requesting MD:                                                  Reason for CC Consult:    IMPRESSION:   Patient Active Problem List   Diagnosis Code    Mental retardation F79    Down's syndrome Q90.9    Alzheimer's dementia G30.9, F02.80    HTN (hypertension) I10    HLD (hyperlipidemia) E78.5    GERD (gastroesophageal reflux disease) K21.9    Sepsis ([facility] [address] A41.9    Acute UTI N39.0    TRANG (acute kidney injury) ([facility] [address] N17.9 ·   Echollie in Urine/Blood   · Septic shock due to urosepsis improved  · Mild drop I nPLT due to meds ?  Sepsis monitor   PLAN:  -- DC meropenam and place on levaquin as sensitive to levaquin  -- On RA doing well but generalized anasarca-- off pressors -- will decrease iv fluids and give one dose of lasix  -- Scortal edema slight improvement-- remove sutton and monitor for any edema related obstruction  -- Strep in blood sensitivity pending-- levaquin is usually sensitive but adjsut per repsonse and final sensitivity   -- DVT study is pending  -- Palliative eval appreciated  -- OT and PT eval  -- DC femoral line, Dc sutton, hemodynamincally stable on RA doing well ok to transfer out of ICU   CVS: Off levophed as tolerated Echo with normal EF  RS:Xray is ok, Aspiration precaution, Continue BD   ID:Follow culture and adjsut vanco 18- 18, Meropenam 8/3/18- 18, Repeated culture is negative so far, Levaquin started on 18 plan to give 18  ENDO:SSI  GI:PPI   RENAL:Monitor electrolytes, Cr imroving monitor for it off sutton  CNS:Baseline status , Resume depakote, Zyprexa at night   HEMATOLOGY:no acute issue Monitor PLT with GNR sepsis  MUSCULOSKELETAL:no acute issue  PAIN AND SEDATION:prn meds  · Skin/Wound: no acute issue  · Electrolytes: Replace electrolytes per ICU electrolyte replacement protocol. · IVF: NS at 25/hr  · Nutrition: diet per speech, Barium swallow ok  · Prophylaxis: DVT Prophylaxis with lovenox,. GI Prophylaxis. · Restraints: none  · PT/OT eval and treat. OOB when appropriate. · Lines/Tubes: none. Fish 8/2/18, Femoral central line placed by ER 8/3/18-- Plan to remove both on 8/5/18  ADVANCE DIRECTIVE:full code  FAMILY DISCUSSION:no one available today. Quality Care: PPI, DVT prophylaxis, HOB elevated, Infection control all reviewed and addressed. Events and notes from last 24 hours reviewed. Care plan discussed with nursing CC TIME:32  min    · Labs and images personally seen and available reports reviewed. · All current medicines are reviewed and doses and prescription adjusted. Subjective/History:  8/5/18:  Overall doing well, off pressors since 3 pm yesterday,  Echollie in urine and blood sensitive to Levaquin RPT culture is negative so far,  PLT mild drop to 130   Cr improved to 1.07    8/4/18  Slight imprvement as more awake and responsive  BP is slowly improving coming off pressors  Echo with normal EF  CT abd cystitis, No obstruction, RLL infillterate not impressed for it to be an infract. This is a 73-year-old patient who lives at [facility]. He has Down syndrome. He has dementia, history of seizures. He came to the hospital with fever that started last night. He was up to 103 at the assisted living facility, had 2 episodes of vomiting per the caregiver with him. They tried Tylenol via EMS en route to the hospital.  He had been eating, drinking and in his usual state of health up until yesterday. He was found to have evidence for UTI in the emergency room. Fish catheter is in place and I cannot tell if that was placed here or there.   We will have to investigate and then ultimately he is going to receive a femoral line for continued fluid resuscitation as his blood pressures have been on the low normal side despite fluid resuscitation in the emergency room. He is going to be admitted to the ICU in the interim for sepsis with early shock. His PCP is Dr. Mitchell Jean Baptiste. Patient was brought to ICU BP is around 100 SYS  Right Groin femoral line placed by Er physician  Blood culture positive for GNR  Still spiking fever of 102       Prior to Admission medications    Medication Sig Start Date End Date Taking? Authorizing Provider   betamethasone valerate (VALISONE) 0.1 % ointment Apply  to affected area two (2) times a day. Yes Shaista Hurley MD   clindamycin (CLINDAGEL) 1 % topical gel Apply  to affected area two (2) times a day. use thin film on affected area   Yes Shaista Hurley MD   mineral oil-isopropyl myristat (EUCERIN) lotion Apply  to affected area as needed for Dry Skin. Yes Shaista Hurley MD   ketoconazole (NIZORAL) 2 % shampoo Apply  to affected area daily as needed for Itching. Yes Shaista Hurley MD   pneumococcal 13 john conj dip (PREVNAR 13, PF,) 0.5 mL syrg injection 0.5 mL by IntraMUSCular route PRIOR TO DISCHARGE. Yes Shaista Hurley MD   clotrimazole (LOTRIMIN) 1 % topical cream Apply  to affected area two (2) times a day. Yes Shaista Hurley MD   vitamin a & d (A&D) ointment Apply  to affected area as needed for Skin Irritation. Yes Shaista Hurley MD   LORazepam (ATIVAN) 1 mg tablet Take 1 Tab by mouth every four (4) hours as needed for Anxiety. Max Daily Amount: 6 mg. 7/3/17   Megan Fritz MD   divalproex (DEPAKOTE SPRINKLE) 125 mg capsule Take 2 Caps by mouth two (2) times a day. 8/29/16   Mitchell Jean Baptiste DO   pantoprazole (PROTONIX) 40 mg tablet Take 1 Tab by mouth two (2) times a day. 8/29/16   Mitchell Jean Baptiste DO   citalopram (CELEXA) 20 mg tablet Take  by mouth daily. Shaista Hurley MD   omega-3 fatty acids-vitamin e (FISH OIL) 1,000 mg cap Take 1 Cap by mouth.     Shaista Hurley MD fluticasone (FLONASE) 50 mcg/actuation nasal spray 2 Sprays by Both Nostrils route daily. Shaista Hurley MD   nutritional supplements (BROOKS) pack Take  by mouth. Shaista Hurley MD   risperiDONE (RISPERDAL) 1 mg tablet Take  by mouth daily. Shaista Hurley MD   acetaminophen (TYLENOL) 650 mg CR tablet Take 650 mg by mouth every six (6) hours as needed for Pain. Shaista Hurley MD   bisacodyl (BISCOLAX) 10 mg suppository Insert 10 mg into rectum daily. Shaista Hurley MD   loperamide (IMMODIUM) 2 mg tablet Take 2 mg by mouth four (4) times daily as needed for Diarrhea. Shaista Hurley MD   methocarbamol (ROBAXIN) 500 mg tablet Take 500 mg by mouth four (4) times daily. Shaista Hurley MD   MAG HYDROX/AL HYDROX/SIMETH (MINTOX PO) Take 15 mL by mouth daily as needed. Shaista Hurley MD   naproxen (NAPROSYN) 500 mg tablet Take 500 mg by mouth two (2) times daily (with meals). Shaista Hurley MD   risperiDONE (RISPERDAL) 0.5 mg tablet Take 0.5 mg by mouth as needed. Shaista Hurley MD   PSEUDOEPHEDRINE-dextromethorphan-guaiFENesin (ROBAFEN CF) 30- mg/5 mL solution Take 5 mL by mouth every four (4) hours as needed for Cough.     Shaista Hurley MD     Current Facility-Administered Medications   Medication Dose Route Frequency    levoFLOXacin (LEVAQUIN) 750 mg in D5W IVPB  750 mg IntraVENous Q24H    furosemide (LASIX) injection 20 mg  20 mg IntraVENous ONCE    pantoprazole (PROTONIX) tablet 40 mg  40 mg Oral ACB    Vancomycin- Pharmacy to dose  1 Each Other Rx Dosing/Monitoring    0.9% sodium chloride infusion  25 mL/hr IntraVENous CONTINUOUS    enoxaparin (LOVENOX) injection 40 mg  40 mg SubCUTAneous Q24H    citalopram (CELEXA) tablet 20 mg  20 mg Oral DAILY    divalproex (DEPAKOTE SPRINKLE) capsule 250 mg  250 mg Oral BID    albuterol-ipratropium (DUO-NEB) 2.5 MG-0.5 MG/3 ML  3 mL Nebulization Q6H RT    OLANZapine (ZyPREXA) tablet 2.5 mg  2.5 mg Oral QPM    insulin lispro (HUMALOG) injection   SubCUTAneous AC&HS Objective:   Vital Signs:    Visit Vitals    /63    Pulse 80    Temp 99.8 °F (37.7 °C)    Resp 20    Ht 5' 2\" (1.575 m)    Wt 82.1 kg (181 lb)    SpO2 90%    BMI 33.11 kg/m2       O2 Device: Room air       Temp (24hrs), Av.4 °F (36.9 °C), Min:97.7 °F (36.5 °C), Max:100.3 °F (37.9 °C)       Intake/Output:   Last shift:      701 - 1900  In: 180 [P.O.:180]  Out: 525 [Urine:525]  Last 3 shifts: 1901 -  07  In: 4137.8 [P.O.:60; I.V.:4077.8]  Out: 1800 [Urine:1800]    Intake/Output Summary (Last 24 hours) at 18 1138  Last data filed at 18 1115   Gross per 24 hour   Intake          2343.13 ml   Output             1725 ml   Net           618.13 ml           Review of Systems:  Not able to get due to patient condition     Objective:    General: comfortable, no acute distress  HEENT: pupils reactive, sclera anicteric, EOM intact  Neck: No adenopathy or thyroid swelling, no lymphadenopathy or JVD, supple  CVS: S1S2 no murmurs  RS: Mod AE bilaterally, prolonged expiration with wheezing  Abd: soft, non tender, no hepatosplenomegaly  Neuro: non focal, awake, alert  Extrm: no leg edema, clubbing or cyanosis  Lymph node: No obvious palpable lymph node appreciated.   Skin: no rash  CBC w/Diff Recent Labs      18   0350  18   0105  18   1520  18   2325   WBC  7.3  11.1  9.5  6.0   RBC  3.19*  3.53*  3.30*  3.96*   HGB  10.5*  11.8*  10.9*  13.2   HCT  32.4*  36.2  33.9*  39.9   PLT  130*  137  135  161   GRANS   --    --    --   75   LYMPH   --    --    --   15*   EOS   --    --    --   1        Chemistry Recent Labs      18   0350  18   1200  18   0105  18   2300  18   1520   GLU  92  134*   --    --   124*   NA  143  143   --    --   142   K  4.0  4.3  3.6   --   3.9   CL  113*  112*   --    --   111*   CO2  26  24   --    --   23   BUN  7  10   --    --   15   CREA  1.07  1.06   --    --   1.44*   CA  7.5*  7.9* --    --   7.0*   MG  2.0  2.2   --   2.6  1.6   PHOS  3.1  2.0*  2.3*   --   2.1*   AGAP  4  7   --    --   8   BUCR  7*  9*   --    --   10*   AP  74  78   --    --   82   TP  5.9*  6.1*   --    --   5.9*   ALB  2.1*  2.0*   --    --   1.9*   GLOB  3.8  4.1*   --    --   4.0   AGRAT  0.6*  0.5*   --    --   0.5*        Lactic Acid Lactic acid   Date Value Ref Range Status   08/03/2018 1.5 0.4 - 2.0 MMOL/L Final     Recent Labs      08/03/18   0452  08/02/18   2325   LAC  1.5  3.0*        Micro  Recent Labs      08/04/18   0614  08/03/18   0110  08/02/18   2340   CULT  NO GROWTH 1 DAY  NO GROWTH 1 DAY  >100,000 COLONIES/mL ESCHERICHIA COLI*  33145 COLONIES/mL POSSIBLE 2ND GRAM NEGATIVE DMITRI*  ANAEROBIC BOTTLE ESCHERICHIA COLI*  For Susceptibility Refer to Culture  [id number]       Recent Labs      08/04/18   0614  08/03/18   0110  08/02/18   2340  08/02/18   2325   CULT  NO GROWTH 1 DAY  NO GROWTH 1 DAY  >100,000 COLONIES/mL ESCHERICHIA COLI*  27648 COLONIES/mL POSSIBLE 2ND GRAM NEGATIVE DMITRI*  ANAEROBIC BOTTLE ESCHERICHIA COLI*  For Susceptibility Refer to Culture  [id number]    AEROBIC AND ANAEROBIC BOTTLES ESCHERICHIA COLI*  ANAEROBIC BOTTLE STREPTOCOCCI, BETA HEMOLYTIC GROUP B*        ABG Recent Labs      08/03/18   1527   FIO2I  21        Liver Enzymes Protein, total   Date Value Ref Range Status   08/05/2018 5.9 (L) 6.4 - 8.2 g/dL Final     Albumin   Date Value Ref Range Status   08/05/2018 2.1 (L) 3.4 - 5.0 g/dL Final     Globulin   Date Value Ref Range Status   08/05/2018 3.8 2.0 - 4.0 g/dL Final     A-G Ratio   Date Value Ref Range Status   08/05/2018 0.6 (L) 0.8 - 1.7   Final     AST (SGOT)   Date Value Ref Range Status   08/05/2018 23 15 - 37 U/L Final     Alk.  phosphatase   Date Value Ref Range Status   08/05/2018 74 45 - 117 U/L Final     Recent Labs      08/05/18   0350  08/04/18   1200  08/03/18   1520   TP  5.9*  6.1*  5.9*   ALB  2.1*  2.0*  1.9*   GLOB  3.8  4.1*  4.0   AGRAT  0.6*  0.5*  0.5* SGOT  23  25  21   AP  74  78  82        Cardiac Enzymes No results found for: CPK, CK, CKMMB, CKMB, RCK3, CKMBT, CKNDX, CKND1, ALDAIR, TROPT, TROIQ, KAREN, TROPT, TNIPOC, BNP, BNPP     BNP No results found for: BNP, BNPP, XBNPT     Coagulation Recent Labs      08/03/18   1520   PTP  15.9*   INR  1.3*   APTT  32.1         Thyroid  Lab Results   Component Value Date/Time    TSH 3.20 08/03/2018 03:20 PM          Lipid Panel Lab Results   Component Value Date/Time    Cholesterol, total 140 05/12/2016 09:59 AM    HDL Cholesterol 46 05/12/2016 09:59 AM    LDL, calculated 75.8 05/12/2016 09:59 AM    VLDL, calculated 18.2 05/12/2016 09:59 AM    Triglyceride 91 05/12/2016 09:59 AM    CHOL/HDL Ratio 3.0 05/12/2016 09:59 AM          Urinalysis Lab Results   Component Value Date/Time    Color YELLOW 08/03/2018 01:10 AM    Appearance CLOUDY 08/03/2018 01:10 AM    Specific gravity 1.017 08/03/2018 01:10 AM    pH (UA) 6.5 08/03/2018 01:10 AM    Protein 30 (A) 08/03/2018 01:10 AM    Glucose NEGATIVE  08/03/2018 01:10 AM    Ketone NEGATIVE  08/03/2018 01:10 AM    Bilirubin NEGATIVE  08/03/2018 01:10 AM    Urobilinogen 1.0 08/03/2018 01:10 AM    Nitrites POSITIVE (A) 08/03/2018 01:10 AM    Leukocyte Esterase LARGE (A) 08/03/2018 01:10 AM    Epithelial cells FEW 08/03/2018 01:10 AM    Bacteria 4+ (A) 08/03/2018 01:10 AM    WBC TOO NUMEROUS TO COUNT 08/03/2018 01:10 AM    RBC 2 to 5 08/03/2018 01:10 AM        XR (Most Recent). CXR reviewed by me and compared with previous CXR   Results from Hospital Encounter encounter on 08/02/18   XR CHEST PORT   Narrative Frontal chest radiograph    Comparison: 8/3/2018    Indication: Hypoxia. Findings: Normal appearance of heart and mediastinum. Pleural spaces appear  clear. No abnormal pulmonary opacities. No acute osseous abnormality.          Impression IMPRESSION:     No acute findings in the chest.         CT (Most Recent)   Results from Hospital Encounter encounter on 08/02/18   CT ABD PELV WO CONT   Narrative EXAM: CT of the abdomen and pelvis    INDICATION: Sepsis. Fevers. COMPARISON: 6/28/2017    TECHNIQUE: Axial CT imaging of the abdomen and pelvis was performed without  intravenous contrast. Multiplanar reformats were generated. DOSE REDUCTION:  One or more dose reduction techniques were used on this CT:  automated exposure control, adjustment of the mAs and/or kVp according to  patient's size, and iterative reconstruction techniques. The specific techniques  utilized on this CT exam have been documented in the patient's electronic  medical record.    _______________    FINDINGS:    LOWER CHEST: Small focal area of consolidation is present within the right lower  lobe. Given the wedge-shaped appearance of this consolidation, this could be  related to either infection or infarct from pulmonary embolism. Mild edema is  present within both lower lobes. Trace pleural effusion. LIVER, BILIARY: Mild hepatic steatosis is present. The unenhanced liver is  otherwise unremarkable. No biliary dilation. Cholelithiasis is present. PANCREAS: Normal.    SPLEEN: Normal.    KIDNEYS/URETERS/BLADDER: A 0.2 cm nonobstructive right renal calculus is  present. The unenhanced kidneys are otherwise unremarkable. There is no  hydronephrosis. Significant stranding is present surrounding the bladder,  concerning for cystitis. Diffuse bladder wall thickening is present. A Fish  catheter is in place. Diffuse stranding from the bladder extends throughout the  lower pelvis. ADRENALS: Normal.    GASTROINTESTINAL TRACT: Mild wall thickening is present within the rectum, which  could be suggestive of mild proctitis or reactive inflammatory changes from the  inflamed bladder. The small bowel and colon are otherwise normal in caliber and  wall thickness. Mild stranding surrounding the appendix is likely reactive from  the diffuse inflammatory changes which are present from the bladder.  There is no  appendix dilation or wall thickening. MESENTERY/PERITONEUM: No lymphadenopathy. RETROPERITONEUM: No lymphadenopathy. PELVIC ORGANS: Unremarkable. VASCULATURE: Unremarkable  right common femoral catheter tip is in the right  common iliac vein. BONES: No acute or aggressive osseous abnormalities identified. Stranding is  present surrounding both hips. Postsurgical changes are present in the right  hip. OTHER: None.    _______________         Impression IMPRESSION:    1. Diffuse wall thickening with surrounding stranding is present surrounding  the bladder, concerning for cystitis. There is also mild wall thickening  surrounding the rectum, which is likely reactive from inflammatory changes in  the bladder, however proctitis excluded. Small bowel and colon are otherwise  normal in caliber and wall thickness. 2.  Small wedge-shaped area of consolidation is present within the right lower  lobe, either related to infection or infarct from a pulmonary embolism. EKG No results found for this or any previous visit. ECHO · Estimated left ventricular ejection fraction is 56 - 60%. Poor resolution of endocardial border. Cannot comment on wall motion abnormality. Mild (grade 1) left ventricular diastolic dysfunction. · This study has limited diagnostic value.         Imaging:  I have personally reviewed the patients radiographs and have reviewed the reports:     Best practice :  Lactic acid ordered- initial and repeat Q6hrs if elevated till normalized. Cultures drawn. Antibiotic administered within 1hr-ICU  And 3hrs ED  Pressors aim MAP >65mmHg  Glycemic control  Sress ulcer prophylaxis  DVT prophylaxis           Santiago Matson M.D.   Pulmonary Critical Care & Sleep Medicine

## 2018-08-05 NOTE — PROGRESS NOTES
Bedside shift change report given to Avery He RN (oncoming nurse) by Anne Marie Major RN (offgoing nurse). Report included the following information SBAR, Kardex, Procedure Summary, Intake/Output, MAR, Accordion, Recent Results and Med Rec Status.

## 2018-08-05 NOTE — PROGRESS NOTES
TRANSFER - IN REPORT:    Verbal report received from [place] RN(name) on Shanique Baker  being received from ICU (unit) for routine progression of care      Report consisted of patients Situation, Background, Assessment and   Recommendations(SBAR). Information from the following report(s) SBAR, Kardex, Procedure Summary, Intake/Output, MAR, Recent Results and Med Rec Status was reviewed with the receiving nurse. Opportunity for questions and clarification was provided. Assessment completed upon patients arrival to unit and care assumed. 1800 Performed bladder scan. Pt had 0 mL. Shift summary: Shift uneventful. Pt rested throughout shift. No complaints of chest pain or shortness of breath. Call light is within reach.

## 2018-08-05 NOTE — PROGRESS NOTES
Problem: Pressure Injury - Risk of  Goal: *Prevention of pressure injury  Document Enrique Scale and appropriate interventions in the flowsheet. Outcome: Not Progressing Towards Goal  Pressure Injury Interventions:  Sensory Interventions: Avoid rigorous massage over bony prominences, Keep linens dry and wrinkle-free    Moisture Interventions: Absorbent underpads    Activity Interventions: Assess need for specialty bed, Increase time out of bed, Pressure redistribution bed/mattress(bed type), PT/OT evaluation    Mobility Interventions: Assess need for specialty bed, HOB 30 degrees or less, Pressure redistribution bed/mattress (bed type), PT/OT evaluation    Nutrition Interventions: Document food/fluid/supplement intake, Discuss nutritional consult with provider    Friction and Shear Interventions: Apply protective barrier, creams and emollients, HOB 30 degrees or less, Lift sheet, Lift team/patient mobility team               Problem: Falls - Risk of  Goal: *Absence of Falls  Document Gael Fall Risk and appropriate interventions in the flowsheet.    Outcome: Progressing Towards Goal  Fall Risk Interventions:  Mobility Interventions: Patient to call before getting OOB, PT Consult for mobility concerns    Mentation Interventions: Adequate sleep, hydration, pain control, Bed/chair exit alarm    Medication Interventions: Evaluate medications/consider consulting pharmacy, Bed/chair exit alarm    Elimination Interventions: Bed/chair exit alarm, Call light in reach, Toilet paper/wipes in reach, Toileting schedule/hourly rounds

## 2018-08-06 ENCOUNTER — APPOINTMENT (OUTPATIENT)
Dept: CT IMAGING | Age: 63
DRG: 871 | End: 2018-08-06
Attending: HOSPITALIST
Payer: MEDICARE

## 2018-08-06 ENCOUNTER — APPOINTMENT (OUTPATIENT)
Dept: VASCULAR SURGERY | Age: 63
DRG: 871 | End: 2018-08-06
Attending: INTERNAL MEDICINE
Payer: MEDICARE

## 2018-08-06 LAB
ALBUMIN SERPL-MCNC: 2.2 G/DL (ref 3.4–5)
ALBUMIN/GLOB SERPL: 0.5 {RATIO} (ref 0.8–1.7)
ALP SERPL-CCNC: 90 U/L (ref 45–117)
ALT SERPL-CCNC: 27 U/L (ref 16–61)
ANION GAP SERPL CALC-SCNC: 5 MMOL/L (ref 3–18)
AST SERPL-CCNC: 23 U/L (ref 15–37)
BACTERIA SPEC CULT: ABNORMAL
BACTERIA SPEC CULT: ABNORMAL
BILIRUB SERPL-MCNC: 0.5 MG/DL (ref 0.2–1)
BUN SERPL-MCNC: 9 MG/DL (ref 7–18)
BUN/CREAT SERPL: 9 (ref 12–20)
CALCIUM SERPL-MCNC: 8 MG/DL (ref 8.5–10.1)
CHLORIDE SERPL-SCNC: 109 MMOL/L (ref 100–108)
CO2 SERPL-SCNC: 28 MMOL/L (ref 21–32)
CREAT SERPL-MCNC: 0.95 MG/DL (ref 0.6–1.3)
ERYTHROCYTE [DISTWIDTH] IN BLOOD BY AUTOMATED COUNT: 14.1 % (ref 11.6–14.5)
GLOBULIN SER CALC-MCNC: 4.2 G/DL (ref 2–4)
GLUCOSE BLD STRIP.AUTO-MCNC: 109 MG/DL (ref 70–110)
GLUCOSE BLD STRIP.AUTO-MCNC: 90 MG/DL (ref 70–110)
GLUCOSE BLD STRIP.AUTO-MCNC: 96 MG/DL (ref 70–110)
GLUCOSE BLD STRIP.AUTO-MCNC: 98 MG/DL (ref 70–110)
GLUCOSE SERPL-MCNC: 97 MG/DL (ref 74–99)
HCT VFR BLD AUTO: 35.9 % (ref 36–48)
HGB BLD-MCNC: 11.6 G/DL (ref 13–16)
INR PPP: 1.1 (ref 0.8–1.2)
MCH RBC QN AUTO: 32.7 PG (ref 24–34)
MCHC RBC AUTO-ENTMCNC: 32.3 G/DL (ref 31–37)
MCV RBC AUTO: 101.1 FL (ref 74–97)
PLATELET # BLD AUTO: 148 K/UL (ref 135–420)
PMV BLD AUTO: 11.2 FL (ref 9.2–11.8)
POTASSIUM SERPL-SCNC: 3.5 MMOL/L (ref 3.5–5.5)
PROT SERPL-MCNC: 6.4 G/DL (ref 6.4–8.2)
PROTHROMBIN TIME: 14.1 SEC (ref 11.5–15.2)
RBC # BLD AUTO: 3.55 M/UL (ref 4.7–5.5)
SERVICE CMNT-IMP: ABNORMAL
SODIUM SERPL-SCNC: 142 MMOL/L (ref 136–145)
WBC # BLD AUTO: 6.4 K/UL (ref 4.6–13.2)

## 2018-08-06 PROCEDURE — 94760 N-INVAS EAR/PLS OXIMETRY 1: CPT

## 2018-08-06 PROCEDURE — A4216 STERILE WATER/SALINE, 10 ML: HCPCS | Performed by: HOSPITALIST

## 2018-08-06 PROCEDURE — 71275 CT ANGIOGRAPHY CHEST: CPT

## 2018-08-06 PROCEDURE — 92526 ORAL FUNCTION THERAPY: CPT

## 2018-08-06 PROCEDURE — 74011000250 HC RX REV CODE- 250: Performed by: INTERNAL MEDICINE

## 2018-08-06 PROCEDURE — 82962 GLUCOSE BLOOD TEST: CPT

## 2018-08-06 PROCEDURE — 74011000250 HC RX REV CODE- 250: Performed by: HOSPITALIST

## 2018-08-06 PROCEDURE — 94640 AIRWAY INHALATION TREATMENT: CPT

## 2018-08-06 PROCEDURE — 74011636320 HC RX REV CODE- 636/320: Performed by: HOSPITALIST

## 2018-08-06 PROCEDURE — 74011250636 HC RX REV CODE- 250/636: Performed by: HOSPITALIST

## 2018-08-06 PROCEDURE — 74011250637 HC RX REV CODE- 250/637: Performed by: INTERNAL MEDICINE

## 2018-08-06 PROCEDURE — 74011000258 HC RX REV CODE- 258: Performed by: HOSPITALIST

## 2018-08-06 PROCEDURE — 85610 PROTHROMBIN TIME: CPT | Performed by: HOSPITALIST

## 2018-08-06 PROCEDURE — 93970 EXTREMITY STUDY: CPT

## 2018-08-06 PROCEDURE — 85027 COMPLETE CBC AUTOMATED: CPT | Performed by: INTERNAL MEDICINE

## 2018-08-06 PROCEDURE — 97166 OT EVAL MOD COMPLEX 45 MIN: CPT

## 2018-08-06 PROCEDURE — 80053 COMPREHEN METABOLIC PANEL: CPT | Performed by: INTERNAL MEDICINE

## 2018-08-06 PROCEDURE — 97530 THERAPEUTIC ACTIVITIES: CPT

## 2018-08-06 PROCEDURE — 65660000000 HC RM CCU STEPDOWN

## 2018-08-06 PROCEDURE — 74011250637 HC RX REV CODE- 250/637: Performed by: HOSPITALIST

## 2018-08-06 PROCEDURE — 36415 COLL VENOUS BLD VENIPUNCTURE: CPT | Performed by: INTERNAL MEDICINE

## 2018-08-06 RX ADMIN — WATER 2 G: 1 INJECTION INTRAMUSCULAR; INTRAVENOUS; SUBCUTANEOUS at 13:01

## 2018-08-06 RX ADMIN — DIVALPROEX SODIUM 250 MG: 125 CAPSULE, COATED PELLETS ORAL at 09:33

## 2018-08-06 RX ADMIN — ENOXAPARIN SODIUM 40 MG: 40 INJECTION, SOLUTION INTRAVENOUS; SUBCUTANEOUS at 06:59

## 2018-08-06 RX ADMIN — IPRATROPIUM BROMIDE AND ALBUTEROL SULFATE 3 ML: .5; 3 SOLUTION RESPIRATORY (INHALATION) at 20:32

## 2018-08-06 RX ADMIN — CITALOPRAM HYDROBROMIDE 20 MG: 20 TABLET ORAL at 09:33

## 2018-08-06 RX ADMIN — PANTOPRAZOLE SODIUM 40 MG: 40 TABLET, DELAYED RELEASE ORAL at 07:01

## 2018-08-06 RX ADMIN — DIVALPROEX SODIUM 250 MG: 125 CAPSULE, COATED PELLETS ORAL at 22:00

## 2018-08-06 RX ADMIN — IOPAMIDOL 100 ML: 755 INJECTION, SOLUTION INTRAVENOUS at 14:36

## 2018-08-06 RX ADMIN — IPRATROPIUM BROMIDE AND ALBUTEROL SULFATE 3 ML: .5; 3 SOLUTION RESPIRATORY (INHALATION) at 14:56

## 2018-08-06 RX ADMIN — OLANZAPINE 2.5 MG: 2.5 TABLET, FILM COATED ORAL at 17:44

## 2018-08-06 RX ADMIN — IPRATROPIUM BROMIDE AND ALBUTEROL SULFATE 3 ML: .5; 3 SOLUTION RESPIRATORY (INHALATION) at 01:08

## 2018-08-06 RX ADMIN — IPRATROPIUM BROMIDE AND ALBUTEROL SULFATE 3 ML: .5; 3 SOLUTION RESPIRATORY (INHALATION) at 07:19

## 2018-08-06 RX ADMIN — CLINDAMYCIN PHOSPHATE 600 MG: 150 INJECTION, SOLUTION INTRAVENOUS at 17:44

## 2018-08-06 NOTE — CDMP QUERY
Please clarify if this patient is being treated/managed for:    => Severe E coli  sepsis with associated organ dysfunction of TRANG  =>Other Explanation of clinical findings  =>Unable to Determine (no explanation of clinical findings)    The medical record reflects the following:    Risk: sepsis    Clinical Indicators: lactic acidosis, temp 101-103, hr 100-110-44, + blood cx, + uti    Treatment:  rocephin, vancomycin, zosyn, , ivF ns bolus. Please clarify and document your clinical opinion in the progress notes and discharge summary including the definitive and/or presumptive diagnosis, (suspected or probable), related to the above clinical findings. Please include clinical findings supporting your diagnosis. If you DECLINE this query or would like to communicate with [facility], please utilize the \"[employer] message box\" at the TOP of the Progress Note on the right.       Thank you,  Vijaya Cleaning RN YOLIE [phone]

## 2018-08-06 NOTE — PROGRESS NOTES
Problem: Self Care Deficits Care Plan (Adult)  Goal: *Acute Goals and Plan of Care (Insert Text)  Outcome: Resolved/Met Date Met: 08/06/18  Occupational Therapy EVALUATION/discharge    Patient: Nelva Buerger (81 y.o. male)  Date: 8/6/2018  Primary Diagnosis: Sepsis ([facility] [address]  Acute UTI  TRANG (acute kidney injury) ([facility] [address]  Sepsis ([facility] [address]  Acute UTI        Precautions:  Fall, Aspiration, Seizure    ASSESSMENT AND RECOMMENDATIONS:  Based on the objective data described below, the patient presents with ability to perform ADL tasks at baseline level. Patient pleasant and cooperative as able. Pt w/ low tone consistent w/ Down's Syndrome dx. Pt able to respond \"ok\" and his name \"monse\" when introduced. Pt grossly maxA to total assist w/ ADLs w/ delay in initiation. Pt also greatly limited by unfamiliar environment. Confirmed pt is max/total assist at baseline. Pt with no further OT/ADL concerns at this time. Skilled occupational therapy is not indicated at this time. Education: Patient instructed on home safety, body mechanics for optimal respiratory effort, Energy Conservation/Work Simplification Techniques, adaptive strategies and adaptive dressing techniques including clothing modifications with patient unable to  verbalize understanding at this time. Discharge Recommendations: None  Further Equipment Recommendations for Discharge: N/A      SUBJECTIVE:   Patient stated Ok.  (when asked if it was ok to clean his glasses)    OBJECTIVE DATA SUMMARY:     Past Medical History:   Diagnosis Date    Alzheimer's dementia     Down's syndrome     Essential hypertension     Seizure ([facility] [address]    History reviewed. No pertinent surgical history. Barriers to Learning/Limitations: yes;   Language, emotional, cognitive, sensory deficits-vision/hearing/speech, financial, physical and altered mental status (i.e.Sedation, Confusion)  Compensate with: visual, verbal, tactile, kinesthetic cues/model    Prior Level of Function/Home Situation: pt resides in a [address] [facility]; pt was able to stand ~6 months ago to assist w/ toileting hygiene, but has had a decline and past 3+ mos has been [address] to w/c by staff at total assist for ADLs. Pt able to feed himself finger foods occasionally and especially when he goes to [employer] (chicken nuggets/cheeseburger cut in 1/4 w/ fries) per RN/Vitaly at [place] [id number].: Group home  # Steps to Enter: 0  One/Two Story Residence: One story  Living Alone: No  Support Systems:  (Group home)  Patient Expects to be Discharged to[de-identified] Unknown  Current DME Used/Available at Home: None  [x]     Right hand dominant   []     Left hand dominant    Cognitive/Behavioral Status:  Neurologic State: Alert;Confused  Orientation Level: Disoriented to place; Disoriented to situation;Disoriented to time;Oriented to person  Cognition: Decreased attention/concentration;Decreased command following; Impaired decision making;Memory loss  Safety/Judgement: Decreased awareness of environment;Decreased awareness of need for assistance;Decreased awareness of need for safety; Lack of insight into deficits    Skin: appears intact; at risk d/t knees contracted; positioned pillows to decrease heel pressure  Edema: no significant edema noted    Vision/Perceptual:     limited at baseline; wears glasses      Coordination:  Coordination: Generally decreased, functional  Fine Motor Skills-Upper: Left Impaired;Right Impaired (at baseline)    Gross Motor Skills-Upper: Left Intact; Right Intact    Strength:  Strength: Generally decreased, functional  Tone & Sensation:  Tone: Abnormal  Sensation: Impaired  Range of Motion:  AROM: Generally decreased, functional  PROM: Generally decreased, functional    ADL Assessment:  Feeding: Maximum assistance; Total assistance    Oral Facial Hygiene/Grooming: Maximum assistance; Total assistance    Bathing: Total assistance    Upper Body Dressing: Total assistance    Lower Body Dressing:  Total assistance    Toileting: Total assistance    ADL Intervention:  Feeding  Feeding Assistance: Maximum assistance; Total assistance (dependent)  Container Management: Total assistance (dependent)  Cutting Food: Total assistance (dependent)  Utensil Management: Maximum assistance; Total assistance (dependent)  Food to Mouth: Moderate assistance (intermittently can perform w/ finger food at baseline)    Grooming  Washing Face: Maximum assistance  Washing Hands: Maximum assistance    Toileting  Toileting Assistance: Total assistance(dependent)    Cognitive Retraining  Orientation Retraining: Reorienting;Place;Situation;Time  Problem Solving: Inductive reason; Identifying the task; Identifying the problem;General alternative solution;Deductive reason  Executive Functions: Executing cognitive plans;Managing time;Regulating behavior  Organizing/Sequencing: Breaking task down;Prioritizing  Attention to Task: Distractibility; Single task  Maintains Attention For (Time): 30 seconds (<)  Safety/Judgement: Decreased awareness of environment;Decreased awareness of need for assistance;Decreased awareness of need for safety; Lack of insight into deficits  Cues: Tactile cues provided;Verbal cues provided;Visual cues provided    Pain:  Pre-treatment: FACES 0/10  Post-treatment: FACES 0/10    Activity Tolerance:   Patient able to complete ADLs with frequent rest breaks. Patient limited by cognition/strength/tone. Patient unsteady     Please refer to the flowsheet for vital signs taken during this treatment. After treatment:   []  Patient left in no apparent distress sitting up in chair  [x]  Patient left in no apparent distress in bed  [x]  Call bell left within reach  [x]  Nursing notified  []  Caregiver present  [x]  Bed alarm activated    COMMUNICATION/EDUCATION:   Communication/Collaboration:  []      Home safety education was provided and the patient/caregiver indicated understanding.   []      Patient/family have participated as able and agree with findings and recommendations. [x]      Patient is unable to participate in plan of care at this time. Thank you for this referral.  Stacey Arndt, OTR/L  Time Calculation: 14 mins    G-Codes (GP)  Self Care   Current  CM= 80-99%   Goal  CM= 80-99%   D/C  CM= 80-99%  The severity rating is based on the professional judgement & direct observation of Level of Assistance required for Functional Mobility and ADLs. Eval Complexity: History: HIGH Complexity : Extensive review of history including physical, cognitive and psychosocial history ; Examination: MEDIUM Complexity : 3-5 performance deficits relating to physical, cognitive , or psychosocial skils that result in activity limitations and / or participation restrictions; Decision Making:HIGH Complexity : Patient presents with comorbidities that affect occupational performance.  Signifigant modification of tasks or assistance (eg, physical or verbal) with assessment (s) is necessary to enable patient to complete evaluation

## 2018-08-06 NOTE — PROGRESS NOTES
Problem: Dysphagia (Adult)  Goal: *Acute Goals and Plan of Care (Insert Text)  Recommendations:  Diet: Mech-soft, nectar-thick liquid  Meds: Whole in puree or as tolerated  Aspiration Precautions  Oral Care TID  Other: ALTERNATE BITES OF SOLID AND SIPS OF LIQUIDS     Goals:  Patient will:  1. Tolerate PO trials with 0 s/s overt distress in 4/5 trials  2. Utilize compensatory swallow strategies/maneuvers (decrease bite/sip, size/rate, alt. liq/sol) with min cues in 4/5 trials  3. Complete an objective swallow study (i.e., MBSS) to assess swallow integrity, r/o aspiration, and determine of safest LRD, min A - goal met 8/3         Outcome: Progressing Towards Goal  Speech language pathology dysphagia treatment    Patient: Jeronimo Singh (89 y.o. male)  Date: 8/6/2018  Diagnosis: Sepsis ([facility] [address]  Acute UTI  TRANG (acute kidney injury) ([facility] [address]  Sepsis ([facility] [address]  Acute UTI Sepsis ([facility] [address]       Precautions: Fall, Aspiration, Seizure  PLOF: Assisted living     ASSESSMENT:  MBSS completed 8/3 with moderate penetration of thin liquid +/- straw and vallecular residue with puree and solid trials with recs for mechanical soft solids, nectar-thick liquid and aspiration precautions. Patient must be fed with alternating bites/sips. Followed up this AM with dysphagia tx. PT at bedside, assisted pt sitting EOB for PO. Pt demo increased alertness from previous tx session. Intermittent command following with model and Y/N question responses. Pt accepted SLP-fed nectar-thick liquid +/- straw and puree with mildly impaired oral bolus control. Pills in puree accepted; required x4 bites puree to clear. Pt demo x1 cough with serial swallows of NTL, resolved with single sips, max cues required for single sips. Recommend mech-soft, NTL diet with strict aspiration precautions. Meds crushed in puree. Pt must be fed, 1:1 supervision. D/w RN, Kel Isaacs. SLP will follow up 1-2x to assess diet tolerance and education.       Progression toward goals:  [] Improving appropriately and progressing toward goals  [x]         Improving slowly and progressing toward goals  []         Not making progress toward goals and plan of care will be adjusted     PLAN:  Recommendations and Planned Interventions:  Mech-soft, NTL   Patient continues to benefit from skilled intervention to address the above impairments. Continue treatment per established plan of care. Discharge Recommendations:  Assisted living     SUBJECTIVE:   Patient stated John Bauman. OBJECTIVE:   Cognitive and Communication Status:  Neurologic State: Alert  Orientation Level: Unable to verbalize  Cognition: Decreased command following  Perception: Appears intact  Perseveration: Perseverates during mobility  Safety/Judgement: Lack of insight into deficits  Dysphagia Treatment:  Oral Assessment:  Oral Assessment  Labial: Decreased rate, Decreased seal  Dentition: Natural, Poor  Oral Hygiene: Fair  Lingual: Right deviation  Velum: Unable to visualize  Mandible: No impairment  P.O. Trials:   Patient Position: EOB    Vocal quality prior to P.O.: No impairment   Consistency Presented: Puree, Nectar thick liquid, Pill/Tablet   How Presented: SLP-fed/presented, Straw, Spoon, Other (comment)       Bolus Acceptance: No impairment   Bolus Formation/Control: Impaired   Type of Impairment: Delayed, Mastication   Propulsion: Delayed (# of seconds)   Oral Residue: Less than 10% of bolus, Lingual   Initiation of Swallow: Delayed (# of seconds)   Laryngeal Elevation: Functional   Aspiration Signs/Symptoms: Delayed cough/throat clear   Pharyngeal Phase Characteristics: Audible swallow, Suspected pharyngeal residue   Effective Modifications:  Alternate liquids/solids, Small sips and bites   Cues for Modifications: Maximal         Oral Phase Severity: Moderate   Pharyngeal Phase Severity : Moderate     PAIN:  Start of Tx: 0  End of Tx: 0     GCODESwallowing:  Swallow Current Status CK= 40-59%   Swallow Goal Status CJ= 20-39%    The severity rating is based on the following outcomes:  YAMINI Noms Swallow Level 4    Clinical Judgement    After treatment:   []              Patient left in no apparent distress sitting up in chair  [x]              Patient left in no apparent distress in bed  [x]              Call bell left within reach  [x]              Nursing notified  []              Family present  []              Caregiver present  []              Bed alarm activated      COMMUNICATION/EDUCATION:   [x] Safe swallowing guidelines; compensatory techniques  [x]        Patient unable to participate in education; education ongoing with staff  [x]  Posted safety precautions in patient's room.   [] Oral-motor/laryngeal strengthening exercises      Osito Gordillo, SLP  Time Calculation: 20 mins

## 2018-08-06 NOTE — PROGRESS NOTES
Problem: Mobility Impaired (Adult and Pediatric)  Goal: *Acute Goals and Plan of Care (Insert Text)  Physical Therapy Goals  Initiated 8/4/2018 and to be accomplished within 3-7 day(s)  1. Patient will move from supine to sit and sit to supine  in bed with moderate assistance . 2.  Patient will transfer from bed to chair and chair to bed with moderate assistance  using the least restrictive device. 3.  Patient will perform sit to stand with moderate assistance . Outcome: Progressing Towards Goal  physical Therapy TREATMENT    Patient: Mikel Mautte (70 y.o. male)  Date: 8/6/2018  Diagnosis: Sepsis ([facility] [address]  Acute UTI  TRANG (acute kidney injury) ([facility] [address]  Sepsis ([facility] [address]  Acute UTI Sepsis ([facility] [address]       Precautions: Fall, Aspiration, Seizure   Chart, physical therapy assessment, plan of care and goals were reviewed. ASSESSMENT:  Pt seen in supine prior to session w/ IV and telemonitor. Pt more alert and participating more in treatment session today. Pt seen w/ nursing staff and Speech at this time. Pt has B/L contracted knees in flexion. Pt able to sit at the EOB to assist w/ speech treatment. Pt initially required support to sit upright however with manual cues pt able to tolerate upright sitting w/o any support, only guarding by PT. Pt demonstrated posterior trunk LOB when he looked up but w/ manual cuing able to regain stability. Pt able to perform forward reaching task while in sitting and able to perform task w/o any difficulty. Pt left back in supine after session, all rails up, call bell and tray in reach, nurse notified after session. Per residence where pt came from PTA pt has not been able to  5 months and is mostly nitin lifted to a w/c.    Progression toward goals:  []      Improving appropriately and progressing toward goals  [x]      Improving slowly and progressing toward goals  []      Not making progress toward goals and plan of care will be adjusted     PLAN:  Patient continues to benefit from skilled intervention to address the above impairments. Continue treatment per established plan of care. Discharge Recommendations:  LTC  Further Equipment Recommendations for Discharge:  N/A     SUBJECTIVE:   Patient stated Yes.     OBJECTIVE DATA SUMMARY:   Critical Behavior:  Neurologic State: Alert  Orientation Level: Unable to verbalize  Cognition: Decreased command following  Safety/Judgement: Lack of insight into deficits  Functional Mobility Training:  Bed Mobility:  Rolling: Total assistance;Assist x2  Supine to Sit: Total assistance;Assist x2  Sit to Supine: Total assistance;Assist x2  Scooting: Total assistance;Assist x2  Balance:  Sitting: Impaired; With support  Sitting - Static: Fair (occasional)  Sitting - Dynamic: Fair (occasional) (Fair-)    Pain:  Pain Scale 1: FLACC  Pain Intensity 1: 0  Activity Tolerance:   Fair  Please refer to the flowsheet for vital signs taken during this treatment.   After treatment:   [] Patient left in no apparent distress sitting up in chair  [x] Patient left in no apparent distress in bed  [x] Call bell left within reach  [x] Nursing notified  [] Caregiver present  [] Bed alarm activated      Gentry Scott PT   Time Calculation: 30 mins

## 2018-08-06 NOTE — PROGRESS NOTES
1925: Assumed patient care from [id number]. Patient is alert. He is nonverbal and non- interactive. This nurse is unable to assess if the patient is oriented to person, place, time and situation. Respiratory status is stable on room air. Vital signs are stable. MEWS score is a one. Patient is unable to deny any pain, discomfort, nausea vomiting dizziness or anxiety. His FLACC score suggests that he is comfortable. White board and fall card are updated. Bed is locked and in lowest position. Call bell, water and personal belongings are within reach. Patient is unable to voice questions, comments or concerns after bedside shift report. 2025: Rounded on the patient. The five \"P's\" were addressed. The patient is in bed watching tv with no signs of distress noted. 2125: Rounded on the patient. The five \"P's\" were addressed. The patient is in bed watching tv with no signs of distress noted. 2225: Rounded on the patient. The five \"P's\" were addressed. The patient is in bed watching tv with no signs of distress noted. 2325: Rounded on the patient. The five \"P's\" were addressed. The patient is in bed watching tv with no signs of distress noted. 0125: Rounded on the patient. The five \"P's\" were addressed. He appeared to be sleeping in bed with no signs of distress noted. 0325: Rounded on the patient. The five \"P's\" were addressed. He appeared to be sleeping in bed with no signs of distress noted. 4302: Rounded on the patient. The five \"P's\" were addressed. He appeared to be sleeping in bed with no signs of distress noted. 0700: Patient had an uneventful shift. Respiratory status, vital signs and MEWS score remained stable. Patient was resting quietly with no signs of distress noted. Bed locked and in lowest position. Call bell water and personal belongings were within reach. Patient is non-verbal any unable to voice any questions, comments or concerns after bedside shift report.  Bedside report given to Cortez Cano R.N.

## 2018-08-06 NOTE — PROGRESS NOTES
0700 Assumed care of pt from [facility] 76.. Pt resting quietly in bed , no signs of distress, call bell within reach. 0715 Assessment completed. Pt AOx0, non verbal non interactive. On RA lung sounds clear, diminished at the bases, NSR, bandage to rt groin CDI, no hematoma noted, IV access 20g lt wrist. FLACC pain scale used 0 out of 10. Will continue to monitor. Shift Summary- Shift uneventful. Pt rested throughout shift. Denied chest pain or shortness of breath. Bedrest . Ate approximately 50-70% of all meals. Oral care done every four hours.

## 2018-08-06 NOTE — PROGRESS NOTES
Problem: Pressure Injury - Risk of  Goal: *Prevention of pressure injury  Document Enrique Scale and appropriate interventions in the flowsheet. Outcome: Progressing Towards Goal  Pressure Injury Interventions:  Sensory Interventions: Assess changes in LOC, Assess need for specialty bed, Check visual cues for pain, Discuss PT/OT consult with provider, Float heels, Keep linens dry and wrinkle-free, Maintain/enhance activity level, Minimize linen layers    Moisture Interventions: Absorbent underpads, Apply protective barrier, creams and emollients, Check for incontinence Q2 hours and as needed, Limit adult briefs, Maintain skin hydration (lotion/cream)    Activity Interventions: Assess need for specialty bed, Increase time out of bed, Pressure redistribution bed/mattress(bed type), PT/OT evaluation    Mobility Interventions: Assess need for specialty bed, Pressure redistribution bed/mattress (bed type), PT/OT evaluation, Turn and reposition approx. every two hours(pillow and wedges)    Nutrition Interventions: Document food/fluid/supplement intake, Discuss nutritional consult with provider    Friction and Shear Interventions: Apply protective barrier, creams and emollients, Feet elevated on foot rest, Lift sheet, Lift team/patient mobility team, Minimize layers               Problem: Falls - Risk of  Goal: *Absence of Falls  Document Gael Fall Risk and appropriate interventions in the flowsheet.    Outcome: Progressing Towards Goal  Fall Risk Interventions:  Mobility Interventions: Assess mobility with egress test, Bed/chair exit alarm, PT Consult for mobility concerns, PT Consult for assist device competence, Communicate number of staff needed for ambulation/transfer    Mentation Interventions: Bed/chair exit alarm, Adequate sleep, hydration, pain control, Eyeglasses and hearing aids, Door open when patient unattended, Family/sitter at bedside, Increase mobility, More frequent rounding, Reorient patient, Room close to nurse's station    Medication Interventions: Assess postural VS orthostatic hypotension, Bed/chair exit alarm, Patient to call before getting OOB, Teach patient to arise slowly    Elimination Interventions: Bed/chair exit alarm, Call light in reach, Toilet paper/wipes in reach, Toileting schedule/hourly rounds

## 2018-08-06 NOTE — PROGRESS NOTES
Transition of care: group home with [facility]  Telephone call to patients brother Osmin Sean he informed cm that Seth Jeter is patients POA. Telephone call to Earnest Fuentes RN informed cm she is out of the  Building. She did inform cm that they are able to administer IV antibiotics at the group home they use [facility]. Cm will need rx for antibiotics and picc care when ready for d/c. Brother agrees for patient to [facility] when ready for d/c.

## 2018-08-06 NOTE — ROUTINE PROCESS
Bedside and Verbal shift change report given to India Garcia RN (oncoming nurse) by Mary Chairez RN (offgoing nurse). Report included the following information SBAR, Kardex, ED Summary, Procedure Summary, Intake/Output, MAR, Accordion, Recent Results and Med Rec Status.

## 2018-08-06 NOTE — PROGRESS NOTES
Patient transferred out of ICU. No acute pulmonary issues. PCCM will sign off. Please call us with any questions.      Stevenson Ortega MD 8/6/2018 9:11 AM

## 2018-08-06 NOTE — PROGRESS NOTES
Care Management and Physicians notes reviewed. Patient not decisional. Unclear if patient has a guardian. From chart review no palliative care needs indicated. We will sign off. Please  re-consult if any needs identified.      Murleen Essex RN

## 2018-08-07 ENCOUNTER — APPOINTMENT (OUTPATIENT)
Dept: INTERVENTIONAL RADIOLOGY/VASCULAR | Age: 63
DRG: 871 | End: 2018-08-07
Attending: HOSPITALIST
Payer: MEDICARE

## 2018-08-07 LAB
ALBUMIN SERPL-MCNC: 2.2 G/DL (ref 3.4–5)
ALBUMIN/GLOB SERPL: 0.5 {RATIO} (ref 0.8–1.7)
ALP SERPL-CCNC: 82 U/L (ref 45–117)
ALT SERPL-CCNC: 22 U/L (ref 16–61)
ANION GAP SERPL CALC-SCNC: 7 MMOL/L (ref 3–18)
AST SERPL-CCNC: 22 U/L (ref 15–37)
BILIRUB SERPL-MCNC: 0.4 MG/DL (ref 0.2–1)
BUN SERPL-MCNC: 8 MG/DL (ref 7–18)
BUN/CREAT SERPL: 8 (ref 12–20)
CALCIUM SERPL-MCNC: 8.2 MG/DL (ref 8.5–10.1)
CHLORIDE SERPL-SCNC: 108 MMOL/L (ref 100–108)
CO2 SERPL-SCNC: 29 MMOL/L (ref 21–32)
CREAT SERPL-MCNC: 0.97 MG/DL (ref 0.6–1.3)
ERYTHROCYTE [DISTWIDTH] IN BLOOD BY AUTOMATED COUNT: 14 % (ref 11.6–14.5)
GLOBULIN SER CALC-MCNC: 4.3 G/DL (ref 2–4)
GLUCOSE BLD STRIP.AUTO-MCNC: 105 MG/DL (ref 70–110)
GLUCOSE BLD STRIP.AUTO-MCNC: 81 MG/DL (ref 70–110)
GLUCOSE BLD STRIP.AUTO-MCNC: 85 MG/DL (ref 70–110)
GLUCOSE BLD STRIP.AUTO-MCNC: 92 MG/DL (ref 70–110)
GLUCOSE SERPL-MCNC: 85 MG/DL (ref 74–99)
HCT VFR BLD AUTO: 34.8 % (ref 36–48)
HGB BLD-MCNC: 11.3 G/DL (ref 13–16)
MCH RBC QN AUTO: 32.8 PG (ref 24–34)
MCHC RBC AUTO-ENTMCNC: 32.5 G/DL (ref 31–37)
MCV RBC AUTO: 101.2 FL (ref 74–97)
PLATELET # BLD AUTO: 176 K/UL (ref 135–420)
PMV BLD AUTO: 11.2 FL (ref 9.2–11.8)
POTASSIUM SERPL-SCNC: 3.8 MMOL/L (ref 3.5–5.5)
PROT SERPL-MCNC: 6.5 G/DL (ref 6.4–8.2)
RBC # BLD AUTO: 3.44 M/UL (ref 4.7–5.5)
SODIUM SERPL-SCNC: 144 MMOL/L (ref 136–145)
WBC # BLD AUTO: 6 K/UL (ref 4.6–13.2)

## 2018-08-07 PROCEDURE — 82962 GLUCOSE BLOOD TEST: CPT

## 2018-08-07 PROCEDURE — 94760 N-INVAS EAR/PLS OXIMETRY 1: CPT

## 2018-08-07 PROCEDURE — 80053 COMPREHEN METABOLIC PANEL: CPT | Performed by: HOSPITALIST

## 2018-08-07 PROCEDURE — 74011250637 HC RX REV CODE- 250/637: Performed by: HOSPITALIST

## 2018-08-07 PROCEDURE — 36415 COLL VENOUS BLD VENIPUNCTURE: CPT | Performed by: HOSPITALIST

## 2018-08-07 PROCEDURE — 74011000250 HC RX REV CODE- 250: Performed by: INTERNAL MEDICINE

## 2018-08-07 PROCEDURE — 74011000250 HC RX REV CODE- 250: Performed by: HOSPITALIST

## 2018-08-07 PROCEDURE — 94640 AIRWAY INHALATION TREATMENT: CPT

## 2018-08-07 PROCEDURE — 74011250636 HC RX REV CODE- 250/636: Performed by: HOSPITALIST

## 2018-08-07 PROCEDURE — 65660000000 HC RM CCU STEPDOWN

## 2018-08-07 PROCEDURE — 02HV33Z INSERTION OF INFUSION DEVICE INTO SUPERIOR VENA CAVA, PERCUTANEOUS APPROACH: ICD-10-PCS | Performed by: RADIOLOGY

## 2018-08-07 PROCEDURE — 97530 THERAPEUTIC ACTIVITIES: CPT

## 2018-08-07 PROCEDURE — 76937 US GUIDE VASCULAR ACCESS: CPT

## 2018-08-07 PROCEDURE — 74011250636 HC RX REV CODE- 250/636: Performed by: RADIOLOGY

## 2018-08-07 PROCEDURE — A4216 STERILE WATER/SALINE, 10 ML: HCPCS | Performed by: HOSPITALIST

## 2018-08-07 PROCEDURE — 85027 COMPLETE CBC AUTOMATED: CPT | Performed by: HOSPITALIST

## 2018-08-07 PROCEDURE — 74011250637 HC RX REV CODE- 250/637: Performed by: INTERNAL MEDICINE

## 2018-08-07 PROCEDURE — 74011000258 HC RX REV CODE- 258: Performed by: HOSPITALIST

## 2018-08-07 RX ORDER — CLINDAMYCIN HYDROCHLORIDE 300 MG/1
300 CAPSULE ORAL 3 TIMES DAILY
Qty: 21 CAP | Refills: 0 | Status: SHIPPED | OUTPATIENT
Start: 2018-08-07 | End: 2018-08-14

## 2018-08-07 RX ORDER — HEPARIN SODIUM (PORCINE) LOCK FLUSH IV SOLN 100 UNIT/ML 100 UNIT/ML
500 SOLUTION INTRAVENOUS
Status: COMPLETED | OUTPATIENT
Start: 2018-08-07 | End: 2018-08-07

## 2018-08-07 RX ORDER — LIDOCAINE HYDROCHLORIDE 10 MG/ML
1-5 INJECTION, SOLUTION EPIDURAL; INFILTRATION; INTRACAUDAL; PERINEURAL
Status: COMPLETED | OUTPATIENT
Start: 2018-08-07 | End: 2018-08-07

## 2018-08-07 RX ORDER — CLINDAMYCIN PHOSPHATE 600 MG/50ML
600 INJECTION, SOLUTION INTRAVENOUS EVERY 8 HOURS
Status: DISCONTINUED | OUTPATIENT
Start: 2018-08-07 | End: 2018-08-08 | Stop reason: HOSPADM

## 2018-08-07 RX ORDER — HEPARIN SODIUM 200 [USP'U]/100ML
500 INJECTION, SOLUTION INTRAVENOUS
Status: COMPLETED | OUTPATIENT
Start: 2018-08-07 | End: 2018-08-07

## 2018-08-07 RX ADMIN — CITALOPRAM HYDROBROMIDE 20 MG: 20 TABLET ORAL at 10:44

## 2018-08-07 RX ADMIN — IPRATROPIUM BROMIDE AND ALBUTEROL SULFATE 3 ML: .5; 3 SOLUTION RESPIRATORY (INHALATION) at 02:02

## 2018-08-07 RX ADMIN — OLANZAPINE 2.5 MG: 2.5 TABLET, FILM COATED ORAL at 18:22

## 2018-08-07 RX ADMIN — CLINDAMYCIN PHOSPHATE 600 MG: 150 INJECTION, SOLUTION INTRAVENOUS at 05:17

## 2018-08-07 RX ADMIN — DIVALPROEX SODIUM 250 MG: 125 CAPSULE, COATED PELLETS ORAL at 22:41

## 2018-08-07 RX ADMIN — HEPARIN SODIUM 1000 UNITS: 200 INJECTION, SOLUTION INTRAVENOUS at 09:41

## 2018-08-07 RX ADMIN — CLINDAMYCIN PHOSPHATE 600 MG: 600 INJECTION, SOLUTION INTRAVENOUS at 22:54

## 2018-08-07 RX ADMIN — ENOXAPARIN SODIUM 40 MG: 40 INJECTION, SOLUTION INTRAVENOUS; SUBCUTANEOUS at 07:10

## 2018-08-07 RX ADMIN — DIVALPROEX SODIUM 250 MG: 125 CAPSULE, COATED PELLETS ORAL at 10:44

## 2018-08-07 RX ADMIN — HEPARIN SODIUM (PORCINE) LOCK FLUSH IV SOLN 100 UNIT/ML 500 UNITS: 100 SOLUTION at 09:42

## 2018-08-07 RX ADMIN — IPRATROPIUM BROMIDE AND ALBUTEROL SULFATE 3 ML: .5; 3 SOLUTION RESPIRATORY (INHALATION) at 07:23

## 2018-08-07 RX ADMIN — LIDOCAINE HYDROCHLORIDE 3 ML: 10 INJECTION, SOLUTION EPIDURAL; INFILTRATION; INTRACAUDAL; PERINEURAL at 09:42

## 2018-08-07 RX ADMIN — IPRATROPIUM BROMIDE AND ALBUTEROL SULFATE 3 ML: .5; 3 SOLUTION RESPIRATORY (INHALATION) at 20:47

## 2018-08-07 RX ADMIN — CLINDAMYCIN PHOSPHATE 600 MG: 600 INJECTION, SOLUTION INTRAVENOUS at 12:00

## 2018-08-07 RX ADMIN — WATER 2 G: 1 INJECTION INTRAMUSCULAR; INTRAVENOUS; SUBCUTANEOUS at 14:52

## 2018-08-07 RX ADMIN — PANTOPRAZOLE SODIUM 40 MG: 40 TABLET, DELAYED RELEASE ORAL at 07:10

## 2018-08-07 NOTE — PROGRESS NOTES
Cm informed by unit claribel Hodges to call  [facility] to speak with Yasmine Segura or Derek Boyce from [facility] concerning patients d/c for abx therapy ,cm called to ask if pt is d/c tomorrow after abx schedules could facility provide transportation to [facility] for daily IV dosage,stff member Yasmine Segura stated she is not the money person to make decision she will need to contact that person prior to making an final decision and will call back cm regarding decision, unit claribel Hodges made aware.

## 2018-08-07 NOTE — ROUTINE PROCESS
Bedside shift change report given to L-3 Monie RN (oncoming nurse) by Dortha Schwab RN (offgoing nurse). Report included the following information SBAR, Kardex and MAR.

## 2018-08-07 NOTE — PROGRESS NOTES
Transition of care; telephone call with Connie De La Garza there is a copay for IV antibiotics and [facility] at [facility] states she does not have authorization to pay this. Telephone call to Jobe Essex she informed cm that she would require 24 hours prior notice for patient d/c to accept and she cannot authorize what [employer]art[employer] is requesting for co-pay she did inform cm she does have transportation. Cm will ask if MD can change the order for patient to receive the medication for [facility].  Cm did try twice to call back to bairon back phone rings and rings no answer will continue to attempt

## 2018-08-07 NOTE — PROCEDURES
Vascular & Interventional Radiology Brief Procedure Note    Interventional Radiologist: Truong Santos MD    Pre-operative Diagnosis:  Long term venous access     Post-operative Diagnosis: Same as pre-op dx    Procedure(s) Performed:  Ultrasound/fluoroscopic guided PICC line placement    Anesthesia:  Local      Findings:  Successful right arm dual lumen 5F PICC line placement. Ready for immediate use.       Complications: None    Estimated Blood Loss:  minimal    Tubes and Drains: as above    Specimens: None    Condition: Stable       Truong Santos MD  [facility]  Vascular & Interventional Radiology  8/7/2018

## 2018-08-07 NOTE — PROGRESS NOTES
Problem: Mobility Impaired (Adult and Pediatric)  Goal: *Acute Goals and Plan of Care (Insert Text)  Physical Therapy Goals  Initiated 8/4/2018 and to be accomplished within 3-7 day(s)  1. Patient will move from supine to sit and sit to supine  in bed with moderate assistance . 2.  Patient will transfer from bed to chair and chair to bed with moderate assistance  using the least restrictive device. 3.  Patient will perform sit to stand with moderate assistance . Outcome: Not Progressing Towards Goal  physical Therapy TREATMENT    Patient: Juli Quintanilla (36 y.o. male)  Date: 8/7/2018  Diagnosis: Sepsis ([facility] [address]  Acute UTI  TRANG (acute kidney injury) ([facility] [address]  Sepsis ([facility] [address]  Acute UTI Sepsis ([facility] [address]       Precautions: Fall, Aspiration, Seizure   Chart, physical therapy assessment, plan of care and goals were reviewed. ASSESSMENT:  Pt is resistant to participation in PT. Assistance given to RN to provide Sole-care and reposition. Pt is presents as total care. Progression toward goals:  []      Improving appropriately and progressing toward goals  [x]      Improving slowly and progressing toward goals  []      Not making progress toward goals and plan of care will be adjusted     PLAN:  Patient continues to benefit from skilled intervention to address the above impairments. Continue treatment per established plan of care. Discharge Recommendations:  LTCF  Further Equipment Recommendations for Discharge:  N/A     SUBJECTIVE:   Patient stated No now!     OBJECTIVE DATA SUMMARY:   Critical Behavior:  Neurologic State: Alert  Orientation Level: Unable to verbalize  Cognition: Unable to assess (comment)  Safety/Judgement: Lack of insight into deficits  Functional Mobility Training:  Bed Mobility:  Rolling: Total assistance;Assist x2   Activity Tolerance:   Fair-  Please refer to the flowsheet for vital signs taken during this treatment.   After treatment:   [] Patient left in no apparent distress sitting up in chair  [x] Patient left in no apparent distress in bed  [x] Call bell left within reach  [x] Nurse present  [] Caregiver present  [x] Bed alarm activated      Kole Speaker, STEVE   Time Calculation: 22 mins

## 2018-08-07 NOTE — H&P
The patient is an appropriate candidate to undergo PICC. Patient assessed immediately prior to induction. Anesthesia plan as follows: Local/No Anesthesia. History and Physical update:  H&P was reviewed and the patient was examined. No changes have occurred in the patient's condition since the H&P was completed.     Nedra Alvarado MD  Vascular & Interventional Radiology  [facility]  8/7/2018

## 2018-08-07 NOTE — PROGRESS NOTES
Assumed care of pt.  0903: Radiology called regarding PICC line for pt. This RN was not aware of procedure for pt. Information was not given in report.

## 2018-08-07 NOTE — PROGRESS NOTES
1930: Assumed patient care from [facility]. Patient is alert. He is nonverbal and non- interactive. This nurse is unable to assess if the patient is oriented to person, place, time and situation. Respiratory status is stable on room air. Vital signs are stable. MEWS score is a one. Patient is unable to deny any pain, discomfort, nausea vomiting dizziness or anxiety. His FLACC score suggests that he is comfortable. White board and fall card are updated. Bed is locked and in lowest position. Call bell, water and personal belongings are within reach. Patient is unable to voice questions, comments or concerns after bedside shift report. 2030: Rounded on the patient. The five \"P's\" were addressed. The patient is watching television in bed with no signs of distress noted. 2130: Rounded on the patient. The five \"P's\" were addressed. The patient is watching television in bed with no signs of distress noted. 2230: Rounded on the patient. The five \"P's\" were addressed. The patient is watching television in bed with no signs of distress noted. 0130: Rounded on the patient. The five \"P's\" were addressed. The patient appears to be sleeping in bed with no signs of distress noted. 0330: Rounded on the patient. The five \"P's\" were addressed. The patient appears to be sleeping in bed with no signs of distress noted. 0530: Rounded on the patient. The five \"P's\" were addressed. The patient appears to be sleeping in bed with no signs of distress noted. 0700: Patient had an uneventful shift. Respiratory status, vital signs and MEWS score remained stable. Patient is resting quietly with no signs of distress noted. Bed locked and in lowest position. Call bell water and personal belongings are within reach. Patient is non-verbal and unable to voice any questions, comments or concerns after bedside shift report.  Bedside report given to [facility].

## 2018-08-07 NOTE — PROGRESS NOTES
NUTRITION FOLLOW-UP    RECOMMENDATIONS/PLAN:   - Continue w/ POC  Monitor labs/lytes, PO intakes, skin integrity, wt, fluid status, BM      NUTRITION ASSESSMENT:   Client Update: 65 yrs old Male with sepsis, UTI, bacteremia, possible RLL pneumonia versus infarct from PE. Pt getting CTA of chest. Pt has downs w/ MR.      FOOD/NUTRITION INTAKE   Diet Order:  Mech Soft; Nectarthick    Food Allergies: NKFA/  Average PO Intake:      Patient Vitals for the past 100 hrs:   % Diet Eaten   08/06/18 1930 80 %   08/06/18 1630 0 %   08/06/18 1348 95 %   08/06/18 1134 0 %   08/06/18 0715 0 %   08/05/18 1925 100 %   08/05/18 1441 100 %   08/05/18 0950 100 %   08/04/18 0905 25 %      Pertinent Medications:  [x] Reviewed; Electrolyte Replacement Protocol: []K []Mg []PO4  Insulin:  []SSI  [x]Pre-meal   []Basal    []Drip  []None  Cultural/Buddhist Food Preferences: None Identified    BIOCHEMICAL DATA & MEDICAL TESTS  Pertinent Labs:  [x] Reviewed; ANTHROPOMETRICS  Height: 5' 2\" (157.5 cm)       Weight: 85.8 kg (189 lb 1.6 oz)         BMI: 34.6 kg/m^2 obese (30%-39.9% BMI)   Adm Weight: 181 lbs                Weight change: +8lbs   Adjusted Body Weight: 60.8kg     NUTRITION-FOCUSED PHYSICAL ASSESSMENT  Skin: No PU      GI: +BM 8/6/18    NUTRITION PRESCRIPTION  Calories: 2183 kcal/day based on [place] x 1.2 x 1.3  Protein: 64 g/day based on 1.2 g/kg of IBW  CHO: 273 g/day based on 50% of total energy  Fluid: 2183 ml/day based on 1 kcal/ml                   NUTRITION DIAGNOSES:   1. .Predicted suboptimal energy intake related to swallowing difficulties as evidence by need for modify diet     NUTRITION INTERVENTIONS:   INTERVENTIONS:                                                                                                                                                                         GOALS:  1. Other: Continue w/ POC 1.  Encourag PO intake >50% at most meals  by next review 3 days        LEARNING NEEDS (Diet, Supplementation, Food/Nutrient-Drug Interaction):   [x] None Identified   [] Education provided/documented      Identified and patient: [] Declined   [] Was not appropriate/indicated        NUTRITION MONITORING /EVALUATION:   Follow PO intake  Monitor wt  Monitor renal labs, electrolytes, fluid status     Previous Recommendations Implemented: yes        Previous Goals Met:  no -PO intakes vary      [] Participated in Interdisciplinary Rounds    [x] Interdisciplinary Care Plan Reviewed  DISCHARGE NUTRITION RECOMMENDATIONS ADDRESSED:      [x] To be determined closer to discharge    NUTRITION RISK:           [x] At risk                        [] Not currently at risk        Will follow-up per policy.   Sixto Hansen 1

## 2018-08-07 NOTE — PROGRESS NOTES
DC plan: DC home with IV abx on Wednesday around [address] spoke with Chantelle Peguero [facility]) [phone], She is agreeable to accepting patient for return to his [place] address of [facility] on Wednesday around 12pm after Rocephin IV dose has been given. Per Chantelle Peguero, she is willing to pay the estimated $197.00 a week, patient will need 10 days of IV abx after tomorrows dose. CAITIE has contacted Debbi with [employer] and provided her with [employer] contact information for payment. Pt will have [facility] [address] [facility]. Prior to patient's discharge:  1) staff will need to administer his Rocephin IV dose. 2) arrange medical transport to his address. Care Management Interventions  PCP Verified by CM: Yes  Palliative Care Criteria Met (RRAT>21 & CHF Dx)?: Yes  Palliative Consult Recommended?: Yes  Transition of Care Consult (CM Consult): Discharge Planning  Speech Therapy Consult:  Yes

## 2018-08-08 VITALS
SYSTOLIC BLOOD PRESSURE: 100 MMHG | DIASTOLIC BLOOD PRESSURE: 60 MMHG | RESPIRATION RATE: 18 BRPM | HEIGHT: 62 IN | TEMPERATURE: 98.6 F | OXYGEN SATURATION: 92 % | BODY MASS INDEX: 34.43 KG/M2 | WEIGHT: 187.1 LBS | HEART RATE: 66 BPM

## 2018-08-08 LAB
ALBUMIN SERPL-MCNC: 2.2 G/DL (ref 3.4–5)
ALBUMIN/GLOB SERPL: 0.5 {RATIO} (ref 0.8–1.7)
ALP SERPL-CCNC: 75 U/L (ref 45–117)
ALT SERPL-CCNC: 19 U/L (ref 16–61)
ANION GAP SERPL CALC-SCNC: 5 MMOL/L (ref 3–18)
AST SERPL-CCNC: 18 U/L (ref 15–37)
BILIRUB SERPL-MCNC: 0.4 MG/DL (ref 0.2–1)
BUN SERPL-MCNC: 8 MG/DL (ref 7–18)
BUN/CREAT SERPL: 9 (ref 12–20)
CALCIUM SERPL-MCNC: 8.4 MG/DL (ref 8.5–10.1)
CHLORIDE SERPL-SCNC: 106 MMOL/L (ref 100–108)
CO2 SERPL-SCNC: 31 MMOL/L (ref 21–32)
CREAT SERPL-MCNC: 0.94 MG/DL (ref 0.6–1.3)
ERYTHROCYTE [DISTWIDTH] IN BLOOD BY AUTOMATED COUNT: 14 % (ref 11.6–14.5)
GLOBULIN SER CALC-MCNC: 4.3 G/DL (ref 2–4)
GLUCOSE BLD STRIP.AUTO-MCNC: 102 MG/DL (ref 70–110)
GLUCOSE BLD STRIP.AUTO-MCNC: 89 MG/DL (ref 70–110)
GLUCOSE SERPL-MCNC: 87 MG/DL (ref 74–99)
HCT VFR BLD AUTO: 35.2 % (ref 36–48)
HGB BLD-MCNC: 11.4 G/DL (ref 13–16)
MCH RBC QN AUTO: 32.9 PG (ref 24–34)
MCHC RBC AUTO-ENTMCNC: 32.4 G/DL (ref 31–37)
MCV RBC AUTO: 101.7 FL (ref 74–97)
PLATELET # BLD AUTO: 191 K/UL (ref 135–420)
PMV BLD AUTO: 11 FL (ref 9.2–11.8)
POTASSIUM SERPL-SCNC: 3.8 MMOL/L (ref 3.5–5.5)
PROT SERPL-MCNC: 6.5 G/DL (ref 6.4–8.2)
RBC # BLD AUTO: 3.46 M/UL (ref 4.7–5.5)
SODIUM SERPL-SCNC: 142 MMOL/L (ref 136–145)
WBC # BLD AUTO: 6.4 K/UL (ref 4.6–13.2)

## 2018-08-08 PROCEDURE — 74011250637 HC RX REV CODE- 250/637: Performed by: INTERNAL MEDICINE

## 2018-08-08 PROCEDURE — 80053 COMPREHEN METABOLIC PANEL: CPT | Performed by: HOSPITALIST

## 2018-08-08 PROCEDURE — 94760 N-INVAS EAR/PLS OXIMETRY 1: CPT

## 2018-08-08 PROCEDURE — 82962 GLUCOSE BLOOD TEST: CPT

## 2018-08-08 PROCEDURE — 94640 AIRWAY INHALATION TREATMENT: CPT

## 2018-08-08 PROCEDURE — 36415 COLL VENOUS BLD VENIPUNCTURE: CPT | Performed by: HOSPITALIST

## 2018-08-08 PROCEDURE — 85027 COMPLETE CBC AUTOMATED: CPT | Performed by: HOSPITALIST

## 2018-08-08 PROCEDURE — 74011250636 HC RX REV CODE- 250/636: Performed by: HOSPITALIST

## 2018-08-08 PROCEDURE — 74011250637 HC RX REV CODE- 250/637: Performed by: HOSPITALIST

## 2018-08-08 PROCEDURE — A4216 STERILE WATER/SALINE, 10 ML: HCPCS | Performed by: HOSPITALIST

## 2018-08-08 PROCEDURE — 74011000250 HC RX REV CODE- 250: Performed by: INTERNAL MEDICINE

## 2018-08-08 PROCEDURE — 92526 ORAL FUNCTION THERAPY: CPT

## 2018-08-08 RX ADMIN — IPRATROPIUM BROMIDE AND ALBUTEROL SULFATE 3 ML: .5; 3 SOLUTION RESPIRATORY (INHALATION) at 07:09

## 2018-08-08 RX ADMIN — CLINDAMYCIN PHOSPHATE 600 MG: 600 INJECTION, SOLUTION INTRAVENOUS at 06:36

## 2018-08-08 RX ADMIN — ENOXAPARIN SODIUM 40 MG: 40 INJECTION, SOLUTION INTRAVENOUS; SUBCUTANEOUS at 06:50

## 2018-08-08 RX ADMIN — DIVALPROEX SODIUM 250 MG: 125 CAPSULE, COATED PELLETS ORAL at 09:00

## 2018-08-08 RX ADMIN — WATER 2 G: 1 INJECTION INTRAMUSCULAR; INTRAVENOUS; SUBCUTANEOUS at 11:15

## 2018-08-08 RX ADMIN — PANTOPRAZOLE SODIUM 40 MG: 40 TABLET, DELAYED RELEASE ORAL at 06:50

## 2018-08-08 RX ADMIN — IPRATROPIUM BROMIDE AND ALBUTEROL SULFATE 3 ML: .5; 3 SOLUTION RESPIRATORY (INHALATION) at 02:12

## 2018-08-08 RX ADMIN — CITALOPRAM HYDROBROMIDE 20 MG: 20 TABLET ORAL at 09:00

## 2018-08-08 NOTE — DISCHARGE SUMMARY
[address]    Courtney Webster  MR#: [id number]  : 1955  ACCOUNT #: [de-identified]   ADMIT DATE: 2018  DISCHARGE DATE:     DIAGNOSES AT DISCHARGE:    1.  Sepsis due to urinary tract infection and bacteremia. 2.  Right lower lobe pneumonia. 3.  Down's syndrome. 4.  Alzheimer's dementia. 5.  Severe debility. HOSPITAL COURSE:  The patient is 65years old. He lives at the [facility]. He has Down's syndrome and dementia. He has treatment for seizures. He has been in the hospital previously for cellulitis of his buttock and a UTI. His brother comes to check on him, but he has a POA at the facility. He is usually wheelchair dependent. There was a caretaker with him when he came into the emergency room. He required aggressive resuscitation for sepsis and admission to the ICU, initially for low blood pressure, but that resolved pretty quickly with antibiotics, fluid resuscitation, and he was noted to have positive blood cultures as well as urine cultures. The blood cultures from  came back with beta hemolytic group B strep and E. coli. The E. coli is sensitive to Rocephin. Second culture on the  was just E. coli and the urine culture came back with E. coli, 2 morphotypes. This is sensitive to Rocephin x2. So the patient initially had broad spectrum antibiotics. These were tapered to Rocephin, and there was a question about a right lower lobe pneumonia also. The chest x-ray on the  showed no acute findings. He had a swallow function video done too. It showed moderate severity laryngeal penetration, no evidence for aspiration. Speech made recommendations on his diet.   A CAT scan of his abdomen and pelvis done when he came in showed a wedge-shaped change on the right lower lobe either related to infection or infarction, and there were cystitis changes as well, so we did a CT angiogram to rule out a PE considering a question about infarction. There was no evidence for pulmonary embolism. There were small bilateral pleural effusions, interstitial edema, a small hiatal hernia so we added clindamycin for pneumonia, and he has done well with that. He had an echocardiogram also completed during this hospital stay. The EF is 56%-60% so with that he has been hemodynamically stable the last 48 hours, doing well. He is fed with assistance. He is essentially bedbound. PHYSICAL EXAMINATION:    GENERAL:  He is in no acute distress. He has been resting comfortably. He awakens to voice. LUNGS:  Clear anteriorly. CARDIOVASCULAR:  Regular rate and rhythm. HEENT:  The oropharynx is dry. No lesions. Head is normocephalic, atraumatic. ABDOMEN:  Soft, nontender. GENITOURINARY:  Normal male genitalia. EXTREMITIES:  Lower extremities no edema. No Fish catheter in place. VITAL SIGNS:  His temperature is 98.4, pulse 61, blood pressure 132/80, respiratory rate is 18. He is saturating 94%-98% on room air. LABORATORY DATA:  His last H and H was 11.3 and 34.8. Platelets and white count are within normal range. His chemistry shows BUN and creatinine to be normal.  GFR is greater than 60. His electrolytes are normal x2 days. LFTs were unremarkable when he came in. Lactate was 3 when he was admitted and 1.5 on repeat. So, with that said, he can discharge from the hospital today. He continues on antibiotics. He will have a PICC line placed today. We recommend 14 days of IV Rocephin from the first negative blood culture which was done on the 4th. The blood cultures from the fourth are negative for 3 days, so once the PICC line is placed and the antibiotics arranged, he can go to the group home on Rocephin 2 grams IV daily for 14 days from the 4th which is until 08/18. The instructions have been noted in the chart and printed also.   Otherwise, he will resume his Tylenol medicine, his bisacodyl, his Celexa, Lotrimin cream, Depakote sprinkles, Flonase, Imodium, Ativan, Robaxin, Naprosyn as needed, Eduardo, Protonix, Risperdal, and other ointments and lotions that he was on at the facility with no changes. We did add clindamycin 300 mg 3 times daily for 7 days as well to cover for pneumonia, but at this point in time, we are treating for sepsis due to UTI and bacteremia associated with E. coli. It is sensitive to Rocephin. Continue the Rocephin treatment here. There is no evidence for current sepsis or active infection clinically, and he is stable to leave the hospital once the PICC is placed. Of note also, his diet at this point, is instructed to be a dysphagia, mechanically altered with nectar thick liquids. COD STATUS:  HE IS A FULL CODE STATUS. Aspiration precautions to be in place. Forty minutes on discharge time.      DISPOSITION:  Group home      Daniel Holloway MD       RI/  D: 08/07/2018 09:25     T: 08/08/2018 09:18  JOB #: [id number]  CC: JOSELITO AHN DO

## 2018-08-08 NOTE — PROGRESS NOTES
1920 bedside and verbal shift changed report received from Lester Cooley RN(off going nurse). Assumed patient care. Patient resting on bed. Bed in low position. White board updated. 2048 Shift assessment done and major findings recorded although difficult to assess the patient because he is unable to interact. 2115  Perineal care done. Diaper changed and positioned the patient. 0040  Reassessment done. No major changes in findings noted. 8790   Reassessment done. No major hanges in findings noted.

## 2018-08-08 NOTE — ROUTINE PROCESS
Bedside and Verbal shift change report given to [phone] (oncoming nurse) by Lucero Parham (offgoing nurse). Report included the following information SBAR, Kardex, MAR and Recent Results.

## 2018-08-08 NOTE — PROGRESS NOTES
Transition of care  anticipte d/c today after IV dose plan for d/c at 12N. Telephone call to Ryan Fang Wills at [facility] states the IV therapy team has made arrangements to deliver the antibiotics. Patient will receive at home on 8/9/2018 12n dose. Patient has PICC. Informed Kavitha Davalos the plan is for d/c with transportation for 12N. She asked that the d/c summary be faxed to her [phone]. CMS is aware and the d/c summary is on chart and signed. Informed Kavitha Davalos would also send copy with patient. No other needs [facility] will visit and teach nurses administration and picc care. Kavitha Davalos states she does have RN and LPN on staff. Christine Da Silva patients nurse is aware that transportation needed for 12n  RN please call report to Kavitha Davalos RN at [address] [place] [address] [phone]   Please include all hard scripts for controlled substances, med rec and dc summary in packet. Please medicate for pain prior to dc if possible and needed to help offset delay when patient first arrives to facility. Care Management Interventions  PCP Verified by CM: Yes  Palliative Care Criteria Met (RRAT>21 & CHF Dx)?: Yes  Palliative Consult Recommended?: Yes  Mode of Transport at Discharge: ALS  Transition of Care Consult (CM Consult): [address]: No  Reason Outside [place]: Patient already serviced by other home care/hospice agency ([facility])  Physical Therapy Consult: Yes  Occupational Therapy Consult: Yes  Speech Therapy Consult: Yes  Current Support Network:  Adult Group Home  Confirm Follow Up Transport: Other (see comment) (caregiver)  Plan discussed with Pt/Family/Caregiver: Yes  Freedom of Choice Offered: Yes  Discharge Location  Discharge Placement: Group home

## 2018-08-08 NOTE — ROUTINE PROCESS
TRANSFER - OUT REPORT:    Verbal report given to Judge Maddy HENRY  (name) on Perez Zapien  being transferred to [address] [place] [address](unit) for routine progression of care       Report consisted of patients Situation, Background, Assessment and   Recommendations(SBAR). Information from the following report(s) SBAR, Kardex and MAR was reviewed with the receiving nurse. Lines:   PICC Double Lumen 64/49/52 Right;Basilic (Active)   Central Line Being Utilized Yes 8/8/2018  4:20 AM   Criteria for Appropriate Use Long term IV/antibiotic administration 8/8/2018  4:20 AM   Site Assessment Clean, dry, & intact 8/8/2018  4:20 AM   Phlebitis Assessment 0 8/8/2018  4:20 AM   Infiltration Assessment 0 8/8/2018  4:20 AM   Dressing Status Clean, dry, & intact 8/8/2018  4:20 AM   Action Taken Open ports on tubing capped 8/8/2018  4:20 AM   Positive Blood Return (Site #1) Yes 8/8/2018  4:20 AM   Positive Blood Return (Site #2) Yes 8/8/2018  4:20 AM        Opportunity for questions and clarification was provided.       Patient transported with:   [employer]

## 2018-08-08 NOTE — PROGRESS NOTES
Problem: Falls - Risk of  Goal: *Absence of Falls  Document Gael Fall Risk and appropriate interventions in the flowsheet.    Outcome: Progressing Towards Goal  Fall Risk Interventions:  Mobility Interventions: Bed/chair exit alarm    Mentation Interventions: Bed/chair exit alarm    Medication Interventions: Bed/chair exit alarm    Elimination Interventions: Call light in reach, Bed/chair exit alarm

## 2018-08-08 NOTE — PROGRESS NOTES
Problem: Dysphagia (Adult)  Goal: *Acute Goals and Plan of Care (Insert Text)  Recommendations:  Diet: Mech-soft, nectar-thick liquid  Meds: Whole in puree or as tolerated  Aspiration Precautions  Oral Care TID  Other: Alternate solid/liquid, small sips/bites    Goals:  Patient will:  1. Tolerate PO trials with 0 s/s overt distress in 4/5 trials - goal met  2. Utilize compensatory swallow strategies/maneuvers (decrease bite/sip, size/rate, alt. liq/sol) with min cues in 4/5 trials - goal met  3. Complete an objective swallow study (i.e., MBSS) to assess swallow integrity, r/o aspiration, and determine of safest LRD, min A - goal met 8/3         Outcome: Resolved/Met Date Met: 08/08/18  Speech language pathology dysphagia treatment & discharge    Patient: Ajay Adams (35 y.o. male)  Date: 8/8/2018  Diagnosis: Sepsis ([facility] [address]  Acute UTI  TRANG (acute kidney injury) ([facility] [address]  Sepsis ([address]  Acute UTI Sepsis ([address]       Precautions: Fall, Aspiration, Seizure  PLOF: Assisted living     ASSESSMENT:  Followed up this day with mech-soft, nectar thick liquid skilled meal assessment. Pt alert, unable to verbalize orientation. Upon entering room, CNA feeding AM meal. Pt observed with mech-soft solids, puree and NTL (via spoon) trials. Pt demo delayed mastication and oral pocketing with solids, cleared with NTL wash. Recommend patient continue mech-soft, NTL diet with aspiration precautions, alt solid/liquid. Pt currently at baseline diet. Maximum therapeutic gains met; safest, least restrictive diet achieved in current in-patient/acute setting. Accordingly, SLP to d/c intervention at this time. Progression toward goals:  [x]         Improving appropriately - goals met/approximated  []         Not making progress/Not appropriate - will d/c POC     PLAN:  Recommendations and Planned Interventions:  Maximum therapeutic gains met; safest, least restrictive diet achieved. D/C ST intervention at this time.    Discharge Recommendations: Assisted living      SUBJECTIVE:   Patient stated Matt Gonzalez. OBJECTIVE:   Cognitive and Communication Status:  Neurologic State: Alert  Orientation Level: Unable to verbalize  Cognition: Unable to assess (comment)  Perception: Appears intact  Perseveration: Perseverates during mobility  Safety/Judgement: Lack of insight into deficits  Dysphagia Treatment:  Oral Assessment:  Oral Assessment  Labial: Decreased rate, Decreased seal  Dentition: Natural, Poor  Oral Hygiene: Fair  Lingual: Right deviation  Velum: Unable to visualize  Mandible: No impairment  P.O. Trials:   Patient Position: EOB    Vocal quality prior to P.O.: No impairment   Consistency Presented: Puree, Nectar thick liquid, Pill/Tablet   How Presented: SLP-fed/presented, Straw, Spoon, Other (comment)       Bolus Acceptance: No impairment   Bolus Formation/Control: Impaired   Type of Impairment: Delayed, Mastication   Propulsion: Delayed (# of seconds)   Oral Residue: Less than 10% of bolus, Lingual   Initiation of Swallow: Delayed (# of seconds)   Laryngeal Elevation: Functional   Aspiration Signs/Symptoms: Delayed cough/throat clear   Pharyngeal Phase Characteristics: Audible swallow, Suspected pharyngeal residue   Effective Modifications:  Alternate liquids/solids, Small sips and bites   Cues for Modifications: Maximal         Oral Phase Severity: Moderate   Pharyngeal Phase Severity : Moderate        PAIN:  Start of Tx: 0  End of Tx: 0     GCODESwallowing:  Swallow Current Status CK= 40-59%   Swallow Goal Status CK= 40-59%   Swallow D/C Status CK= 40-59%    The severity rating is based on the following outcomes:  YAMINI Noms Swallow Level 4    Clinical Judgement    After treatment:   []              Patient left in no apparent distress sitting up in chair  [x]              Patient left in no apparent distress in bed  [x]              Call bell left within reach  [x]              Nursing notified  []              Caregiver present  [] Bed alarm activated      COMMUNICATION/EDUCATION:    [x] Safe swallowing guidelines; compensatory techniques  [x]        Patient unable to participate in education; education ongoing with staff  [x]  Posted safety precautions in patient's room.   [] Oral-motor/laryngeal strengthening exercises    SAMAN Kaplan  Time Calculation: 20 mins

## 2018-08-10 LAB
BACTERIA SPEC CULT: NORMAL
BACTERIA SPEC CULT: NORMAL
SERVICE CMNT-IMP: NORMAL
SERVICE CMNT-IMP: NORMAL

## 2018-08-13 LAB
SPECIMEN SOURCE: NORMAL
VIRUS SPEC CULT: NORMAL

## 2019-01-18 ENCOUNTER — APPOINTMENT (OUTPATIENT)
Dept: CT IMAGING | Age: 64
End: 2019-01-18
Attending: EMERGENCY MEDICINE
Payer: MEDICARE

## 2019-01-18 ENCOUNTER — APPOINTMENT (OUTPATIENT)
Dept: GENERAL RADIOLOGY | Age: 64
End: 2019-01-18
Attending: EMERGENCY MEDICINE
Payer: MEDICARE

## 2019-01-18 ENCOUNTER — HOSPITAL ENCOUNTER (EMERGENCY)
Age: 64
Discharge: HOME | End: 2019-01-19
Attending: EMERGENCY MEDICINE
Payer: MEDICARE

## 2019-01-18 DIAGNOSIS — N39.0 URINARY TRACT INFECTION WITH HEMATURIA, SITE UNSPECIFIED: ICD-10-CM

## 2019-01-18 DIAGNOSIS — R31.9 URINARY TRACT INFECTION WITH HEMATURIA, SITE UNSPECIFIED: ICD-10-CM

## 2019-01-18 DIAGNOSIS — T50.905A ADVERSE EFFECT OF DRUG, INITIAL ENCOUNTER: Primary | ICD-10-CM

## 2019-01-18 LAB
ALBUMIN SERPL-MCNC: 2.9 G/DL (ref 3.4–5)
ALBUMIN/GLOB SERPL: 0.7 {RATIO} (ref 0.8–1.7)
ALP SERPL-CCNC: 73 U/L (ref 45–117)
ALT SERPL-CCNC: 23 U/L (ref 16–61)
AMORPH CRY URNS QL MICRO: ABNORMAL
ANION GAP SERPL CALC-SCNC: 7 MMOL/L (ref 3–18)
APPEARANCE UR: ABNORMAL
AST SERPL-CCNC: 23 U/L (ref 15–37)
BACTERIA URNS QL MICRO: ABNORMAL /HPF
BASOPHILS # BLD: 0.1 K/UL (ref 0–0.1)
BASOPHILS NFR BLD: 1 % (ref 0–2)
BILIRUB SERPL-MCNC: 0.3 MG/DL (ref 0.2–1)
BILIRUB UR QL: NEGATIVE
BUN SERPL-MCNC: 20 MG/DL (ref 7–18)
BUN/CREAT SERPL: 21 (ref 12–20)
CALCIUM SERPL-MCNC: 8 MG/DL (ref 8.5–10.1)
CHLORIDE SERPL-SCNC: 107 MMOL/L (ref 100–108)
CK MB CFR SERPL CALC: 1.9 % (ref 0–4)
CK MB SERPL-MCNC: 1.1 NG/ML (ref 5–25)
CK SERPL-CCNC: 58 U/L (ref 39–308)
CO2 SERPL-SCNC: 26 MMOL/L (ref 21–32)
COLOR UR: YELLOW
CREAT SERPL-MCNC: 0.94 MG/DL (ref 0.6–1.3)
DIFFERENTIAL METHOD BLD: ABNORMAL
EOSINOPHIL # BLD: 0.1 K/UL (ref 0–0.4)
EOSINOPHIL NFR BLD: 3 % (ref 0–5)
EPITH CASTS URNS QL MICRO: ABNORMAL /LPF (ref 0–5)
ERYTHROCYTE [DISTWIDTH] IN BLOOD BY AUTOMATED COUNT: 13.5 % (ref 11.6–14.5)
ETHANOL SERPL-MCNC: 7 MG/DL (ref 0–3)
GLOBULIN SER CALC-MCNC: 3.9 G/DL (ref 2–4)
GLUCOSE SERPL-MCNC: 102 MG/DL (ref 74–99)
GLUCOSE UR STRIP.AUTO-MCNC: NEGATIVE MG/DL
HCT VFR BLD AUTO: 42.2 % (ref 36–48)
HGB BLD-MCNC: 14 G/DL (ref 13–16)
HGB UR QL STRIP: ABNORMAL
KETONES UR QL STRIP.AUTO: NEGATIVE MG/DL
LEUKOCYTE ESTERASE UR QL STRIP.AUTO: ABNORMAL
LYMPHOCYTES # BLD: 2.3 K/UL (ref 0.9–3.6)
LYMPHOCYTES NFR BLD: 46 % (ref 21–52)
MAGNESIUM SERPL-MCNC: 2.5 MG/DL (ref 1.6–2.6)
MCH RBC QN AUTO: 32.6 PG (ref 24–34)
MCHC RBC AUTO-ENTMCNC: 33.2 G/DL (ref 31–37)
MCV RBC AUTO: 98.1 FL (ref 74–97)
MONOCYTES # BLD: 0.5 K/UL (ref 0.05–1.2)
MONOCYTES NFR BLD: 10 % (ref 3–10)
MUCOUS THREADS URNS QL MICRO: ABNORMAL /LPF
NEUTS SEG # BLD: 2 K/UL (ref 1.8–8)
NEUTS SEG NFR BLD: 40 % (ref 40–73)
NITRITE UR QL STRIP.AUTO: NEGATIVE
PH UR STRIP: 7 [PH] (ref 5–8)
PLATELET # BLD AUTO: 224 K/UL (ref 135–420)
PMV BLD AUTO: 10.5 FL (ref 9.2–11.8)
POTASSIUM SERPL-SCNC: 3.8 MMOL/L (ref 3.5–5.5)
PROT SERPL-MCNC: 6.8 G/DL (ref 6.4–8.2)
PROT UR STRIP-MCNC: ABNORMAL MG/DL
RBC # BLD AUTO: 4.3 M/UL (ref 4.7–5.5)
RBC #/AREA URNS HPF: >100 /HPF (ref 0–5)
SODIUM SERPL-SCNC: 140 MMOL/L (ref 136–145)
SP GR UR REFRACTOMETRY: 1.02 (ref 1–1.03)
TROPONIN I SERPL-MCNC: <0.02 NG/ML (ref 0–0.04)
UROBILINOGEN UR QL STRIP.AUTO: 1 EU/DL (ref 0.2–1)
WBC # BLD AUTO: 4.9 K/UL (ref 4.6–13.2)
WBC URNS QL MICRO: ABNORMAL /HPF (ref 0–5)

## 2019-01-18 PROCEDURE — 87077 CULTURE AEROBIC IDENTIFY: CPT

## 2019-01-18 PROCEDURE — 74011000250 HC RX REV CODE- 250: Performed by: EMERGENCY MEDICINE

## 2019-01-18 PROCEDURE — 80307 DRUG TEST PRSMV CHEM ANLYZR: CPT

## 2019-01-18 PROCEDURE — 70450 CT HEAD/BRAIN W/O DYE: CPT

## 2019-01-18 PROCEDURE — 82550 ASSAY OF CK (CPK): CPT

## 2019-01-18 PROCEDURE — 71045 X-RAY EXAM CHEST 1 VIEW: CPT

## 2019-01-18 PROCEDURE — 80164 ASSAY DIPROPYLACETIC ACD TOT: CPT

## 2019-01-18 PROCEDURE — 51701 INSERT BLADDER CATHETER: CPT

## 2019-01-18 PROCEDURE — 80053 COMPREHEN METABOLIC PANEL: CPT

## 2019-01-18 PROCEDURE — 96374 THER/PROPH/DIAG INJ IV PUSH: CPT

## 2019-01-18 PROCEDURE — 87086 URINE CULTURE/COLONY COUNT: CPT

## 2019-01-18 PROCEDURE — 85025 COMPLETE CBC W/AUTO DIFF WBC: CPT

## 2019-01-18 PROCEDURE — 83735 ASSAY OF MAGNESIUM: CPT

## 2019-01-18 PROCEDURE — 81001 URINALYSIS AUTO W/SCOPE: CPT

## 2019-01-18 PROCEDURE — 77030005563 HC CATH URETH INT MMGH -A

## 2019-01-18 PROCEDURE — 93005 ELECTROCARDIOGRAM TRACING: CPT

## 2019-01-18 PROCEDURE — 74011250636 HC RX REV CODE- 250/636: Performed by: EMERGENCY MEDICINE

## 2019-01-18 PROCEDURE — 99285 EMERGENCY DEPT VISIT HI MDM: CPT

## 2019-01-18 RX ORDER — GUAIFENESIN 100 MG/5ML
81 LIQUID (ML) ORAL DAILY
COMMUNITY

## 2019-01-18 RX ORDER — LORAZEPAM 2 MG/1
2 TABLET ORAL
COMMUNITY

## 2019-01-18 RX ORDER — ATORVASTATIN CALCIUM 40 MG/1
40 TABLET, FILM COATED ORAL DAILY
COMMUNITY

## 2019-01-18 RX ADMIN — CEFTRIAXONE SODIUM 1 G: 1 INJECTION, POWDER, FOR SOLUTION INTRAMUSCULAR; INTRAVENOUS at 23:50

## 2019-01-19 VITALS
DIASTOLIC BLOOD PRESSURE: 67 MMHG | SYSTOLIC BLOOD PRESSURE: 117 MMHG | TEMPERATURE: 98.3 F | HEIGHT: 66 IN | WEIGHT: 180 LBS | HEART RATE: 48 BPM | BODY MASS INDEX: 28.93 KG/M2 | RESPIRATION RATE: 11 BRPM | OXYGEN SATURATION: 95 %

## 2019-01-19 LAB — VALPROATE SERPL-MCNC: 26 UG/ML (ref 50–100)

## 2019-01-19 NOTE — ED PROVIDER NOTES
EMERGENCY DEPARTMENT HISTORY AND PHYSICAL EXAM 
 
Date: 1/18/2019 Patient Name: Vanessa Rodríguez History of Presenting Illness Chief Complaint Patient presents with  Altered mental status History Provided By: Caregiver Chief Complaint: AMS Duration: 2 Hours Timing:  Acute Location: N/A Quality: \"not at baseline\" Severity: Mild Modifying Factors: No modifying factors Associated Symptoms: decreased activity Additional History (Context):  
10:08 PM 
Vanessa Rodríguez is a 59 y.o. male with PMHX of Dementia, Alzheimer's, Seizure, HTN, who presents via EMS to the emergency department C/O AMS onset 2 hours ago. Associated sxs include decreased activity. Per pt's caregiver, pt is not responding at his baseline. Per pt's caregiver, pt was given medications tonight around 7. Per pt's caregiver, pt was at his baseline before being given his medications. Pt's caregiver denies observing fever, chills, and any other sxs or complaints. PCP: Promise White, DO 
 
Current Outpatient Medications Medication Sig Dispense Refill  aspirin 81 mg chewable tablet Take 81 mg by mouth daily.  atorvastatin (LIPITOR) 40 mg tablet Take 40 mg by mouth daily.  betamethasone valerate (VALISONE) 0.1 % ointment Apply  to affected area two (2) times a day.  clotrimazole (LOTRIMIN) 1 % topical cream Apply  to affected area two (2) times a day.  divalproex (DEPAKOTE SPRINKLE) 125 mg capsule Take 2 Caps by mouth two (2) times a day. (Patient taking differently: Take 250 mg by mouth daily.) 60 Cap 0  
 citalopram (CELEXA) 20 mg tablet Take  by mouth daily.  fluticasone (FLONASE) 50 mcg/actuation nasal spray 2 Sprays by Both Nostrils route daily.  nutritional supplements (BROOKS) pack Take  by mouth.  acetaminophen (TYLENOL) 650 mg CR tablet Take 650 mg by mouth every six (6) hours as needed for Pain.     
 loperamide (IMMODIUM) 2 mg tablet Take 2 mg by mouth four (4) times daily as needed for Diarrhea.  methocarbamol (ROBAXIN) 500 mg tablet Take 500 mg by mouth four (4) times daily.  naproxen (NAPROSYN) 500 mg tablet Take 500 mg by mouth two (2) times daily (with meals).  risperiDONE (RISPERDAL) 0.5 mg tablet Take 0.5 mg by mouth as needed.  LORazepam (ATIVAN) 2 mg tablet Take 2 mg by mouth every six (6) hours as needed for Anxiety.  clindamycin (CLINDAGEL) 1 % topical gel Apply  to affected area two (2) times a day. use thin film on affected area  mineral oil-isopropyl myristat (EUCERIN) lotion Apply  to affected area as needed for Dry Skin.  ketoconazole (NIZORAL) 2 % shampoo Apply  to affected area daily as needed for Itching.  vitamin a & d (A&D) ointment Apply  to affected area as needed for Skin Irritation.  LORazepam (ATIVAN) 1 mg tablet Take 1 Tab by mouth every four (4) hours as needed for Anxiety. Max Daily Amount: 6 mg. 2 Tab 0  
 pantoprazole (PROTONIX) 40 mg tablet Take 1 Tab by mouth two (2) times a day. 30 Tab 0  
 omega-3 fatty acids-vitamin e (FISH OIL) 1,000 mg cap Take 1 Cap by mouth.  risperiDONE (RISPERDAL) 1 mg tablet Take  by mouth daily.  bisacodyl (BISCOLAX) 10 mg suppository Insert 10 mg into rectum daily.  MAG HYDROX/AL HYDROX/SIMETH (MINTOX PO) Take 15 mL by mouth daily as needed.  PSEUDOEPHEDRINE-dextromethorphan-guaiFENesin (ROBAFEN CF) 30- mg/5 mL solution Take 5 mL by mouth every four (4) hours as needed for Cough. Past History Past Medical History: 
Past Medical History:  
Diagnosis Date  Alzheimer's dementia  Down's syndrome  Essential hypertension  Seizure ([facility] [address] Past Surgical History: 
History reviewed. No pertinent surgical history. Family History: 
History reviewed. No pertinent family history. Social History: 
Social History Tobacco Use  Smoking status: Never Smoker  Smokeless tobacco: Never Used Substance Use Topics  Alcohol use: No  
 Drug use: No  
 
 
Allergies: 
No Known Allergies Review of Systems Review of Systems Unable to perform ROS: Mental status change All other systems reviewed and are negative. Physical Exam  
 
Vitals:  
 01/18/19 2205 01/18/19 2230 01/18/19 2300 BP: 115/68 111/70 112/81 Pulse: (!) 56 (!) 53 (!) 47 Resp: 14 19 11 Temp: 98.3 °F (36.8 °C) SpO2: 95% Weight: 81.6 kg (180 lb) Height: 5' 6\" (1.676 m) Physical Exam  
Constitutional: He is oriented to person, place, and time. He appears well-developed and well-nourished. He appears lethargic. Easily aroused and will open eyes and try to follow commands HENT:  
Head: Normocephalic and atraumatic. Eyes: Pupils are equal, round, and reactive to light. Closed pupils are pinpoint Neck: Neck supple. Cardiovascular: Normal rate, regular rhythm, S1 normal, S2 normal and normal heart sounds. Pulmonary/Chest: Breath sounds normal. No respiratory distress. He has no wheezes. He has no rales. He exhibits no tenderness. Abdominal: Soft. He exhibits no distension and no mass. There is no tenderness. There is no guarding. Musculoskeletal: Normal range of motion. He exhibits no edema or tenderness. No signs of trauma Neurological: He is oriented to person, place, and time. He appears lethargic. No cranial nerve deficit. GCS eye subscore is 2. GCS verbal subscore is 2. GCS motor subscore is 4. Skin: No rash noted. Psychiatric: He has a normal mood and affect. His behavior is normal. Thought content normal.  
Nursing note and vitals reviewed. Diagnostic Study Results Labs - Recent Results (from the past 12 hour(s)) CBC WITH AUTOMATED DIFF Collection Time: 01/18/19 10:05 PM  
Result Value Ref Range WBC 4.9 4.6 - 13.2 K/uL  
 RBC 4.30 (L) 4.70 - 5.50 M/uL  
 HGB 14.0 13.0 - 16.0 g/dL HCT 42.2 36.0 - 48.0 %  MCV 98.1 (H) 74.0 - 97.0 FL  
 MCH 32.6 24.0 - 34.0 PG  
 MCHC 33.2 31.0 - 37.0 g/dL  
 RDW 13.5 11.6 - 14.5 % PLATELET 260 407 - 264 K/uL MPV 10.5 9.2 - 11.8 FL  
 NEUTROPHILS 40 40 - 73 % LYMPHOCYTES 46 21 - 52 % MONOCYTES 10 3 - 10 % EOSINOPHILS 3 0 - 5 % BASOPHILS 1 0 - 2 %  
 ABS. NEUTROPHILS 2.0 1.8 - 8.0 K/UL  
 ABS. LYMPHOCYTES 2.3 0.9 - 3.6 K/UL  
 ABS. MONOCYTES 0.5 0.05 - 1.2 K/UL  
 ABS. EOSINOPHILS 0.1 0.0 - 0.4 K/UL  
 ABS. BASOPHILS 0.1 0.0 - 0.1 K/UL  
 DF AUTOMATED METABOLIC PANEL, COMPREHENSIVE Collection Time: 01/18/19 10:05 PM  
Result Value Ref Range Sodium 140 136 - 145 mmol/L Potassium 3.8 3.5 - 5.5 mmol/L Chloride 107 100 - 108 mmol/L  
 CO2 26 21 - 32 mmol/L Anion gap 7 3.0 - 18 mmol/L Glucose 102 (H) 74 - 99 mg/dL BUN 20 (H) 7.0 - 18 MG/DL Creatinine 0.94 0.6 - 1.3 MG/DL  
 BUN/Creatinine ratio 21 (H) 12 - 20 GFR est AA >60 >60 ml/min/1.73m2 GFR est non-AA >60 >60 ml/min/1.73m2 Calcium 8.0 (L) 8.5 - 10.1 MG/DL Bilirubin, total 0.3 0.2 - 1.0 MG/DL  
 ALT (SGPT) 23 16 - 61 U/L  
 AST (SGOT) 23 15 - 37 U/L Alk. phosphatase 73 45 - 117 U/L Protein, total 6.8 6.4 - 8.2 g/dL Albumin 2.9 (L) 3.4 - 5.0 g/dL Globulin 3.9 2.0 - 4.0 g/dL A-G Ratio 0.7 (L) 0.8 - 1.7 MAGNESIUM Collection Time: 01/18/19 10:05 PM  
Result Value Ref Range Magnesium 2.5 1.6 - 2.6 mg/dL ETHYL ALCOHOL Collection Time: 01/18/19 10:05 PM  
Result Value Ref Range ALCOHOL(ETHYL),SERUM 7 (H) 0 - 3 MG/DL  
CARDIAC PANEL,(CK, CKMB & TROPONIN) Collection Time: 01/18/19 10:05 PM  
Result Value Ref Range CK 58 39 - 308 U/L  
 CK - MB 1.1 <3.6 ng/ml CK-MB Index 1.9 0.0 - 4.0 % Troponin-I, QT <0.02 0.0 - 0.045 NG/ML  
EKG, 12 LEAD, INITIAL Collection Time: 01/18/19 10:57 PM  
Result Value Ref Range Ventricular Rate 53 BPM  
 Atrial Rate 59 BPM  
 P-R Interval 148 ms QRS Duration 84 ms Q-T Interval 458 ms QTC Calculation (Bezet) 429 ms Calculated P Axis 40 degrees Calculated R Axis -2 degrees Calculated T Axis 53 degrees Diagnosis Sinus bradycardia Otherwise normal ECG When compared with ECG of 09-NOV-2017 11:19, 
Vent. rate has decreased BY  31 BPM 
  
URINALYSIS W/ RFLX MICROSCOPIC Collection Time: 01/18/19 11:12 PM  
Result Value Ref Range Color YELLOW Appearance CLOUDY Specific gravity 1.018 1.005 - 1.030    
 pH (UA) 7.0 5.0 - 8.0 Protein TRACE (A) NEG mg/dL Glucose NEGATIVE  NEG mg/dL Ketone NEGATIVE  NEG mg/dL Bilirubin NEGATIVE  NEG Blood MODERATE (A) NEG Urobilinogen 1.0 0.2 - 1.0 EU/dL Nitrites NEGATIVE  NEG Leukocyte Esterase MODERATE (A) NEG URINE MICROSCOPIC ONLY Collection Time: 01/18/19 11:12 PM  
Result Value Ref Range WBC 75 to 100 0 - 5 /hpf  
 RBC >100 (H) 0 - 5 /hpf Epithelial cells 10 to 12 0 - 5 /lpf Bacteria 2+ (A) NEG /hpf Mucus FEW (A) NEG /lpf Amorphous Crystals 1+ (A) NEG Radiologic Studies -  
CT HEAD WO CONT Final Result IMPRESSION:  
  
Cerebral atrophy and mild bilateral periventricular and central white matter  
diminished attenuation which is nonspecific but likely to represent  
microvascular disease. No acute intracranial process. No significant interval change. XR CHEST PORT    (Results Pending) CT Results  (Last 48 hours) 01/18/19 2342  CT HEAD WO CONT Final result Impression:  IMPRESSION:  
   
Cerebral atrophy and mild bilateral periventricular and central white matter  
diminished attenuation which is nonspecific but likely to represent  
microvascular disease. No acute intracranial process. No significant interval change. Narrative:  EXAM: CT head INDICATION: Confusion. Altered mental status. COMPARISON: November 9, 2017. TECHNIQUE: Axial CT imaging of the head was performed without intravenous contrast. Dose reduction techniques used: automated exposure control, adjustment  
of the mAs and/or kVp according to patient size, and iterative reconstruction  
techniques. _______________ FINDINGS:  
   
BRAIN AND POSTERIOR FOSSA: There is cerebral atrophy with enlargement of the  
lateral and the third ventricles. The cortical sulci are widened appropriately. The fourth ventricle and basal cisterns are normally outlined. There is mild  
bilateral periventricular and central white matter diminished attenuation. There  
is no acute territorial defect, hemorrhage or midline shift. EXTRA-AXIAL SPACES AND MENINGES: There are no abnormal extra-axial fluid  
collections. CALVARIUM: Intact. SINUSES: Clear. OTHER: The mastoids are again noted to be under aerated.  
   
_______________ CXR Results  (Last 48 hours) 01/18/19 2234  XR CHEST PORT Final result Impression:  Impression:  
-------------- No active pulmonary disease. Narrative:  ---------------------------------------------------------------------------  
<<<<<<<<<           [facility]           >>>>>>>>>   
--------------------------------------------------------------------------- CLINICAL HISTORY:  Altered mental status. COMPARISON EXAMINATIONS:  August 4, 2018. ---  SINGLE FRONTAL VIEW OF THE CHEST  --- There is no focal consolidation or pleural effusion. The mediastinum is  
unremarkable in appearance. No significant osseous abnormalities are  
identified.    
   
   
-------------- Medications given in the ED- Medications  
cefTRIAXone (ROCEPHIN) 1 g in sterile water (preservative free) 10 mL IV syringe (1 g IntraVENous Given 1/18/19 0580) Medical Decision Making I am the first provider for this patient.  
 
I reviewed the vital signs, available nursing notes, past medical history, past surgical history, family history and social history. Vital Signs-Reviewed the patient's vital signs. Pulse Oximetry Analysis - 95% on RA Cardiac Monitor: 
Rate: 53 bpm 
Rhythm: Sinus bradycardia EKG interpretation: (Preliminary) 10:06 PM  
Sinus bradycardia, 53 bpm, QRS 84 ms EKG read by Verena Mcclellan MD at 10:58 PM  
 
Records Reviewed: Nursing Notes and Old Medical Records Provider Notes (Medical Decision Making):  
 
Procedures: 
Procedures ED Course:  
10:08 PM Initial assessment performed. The patients presenting problems have been discussed, and they are in agreement with the care plan formulated and outlined with them. I have encouraged them to ask questions as they arise throughout their visit. 
 
1:01 AM Pt given Risperdal, Kepra and possibly Ativan. He is lethargic and sleepy in ED. Work up is unremarkable except for UTI. He was more easily arousable at discharge and I suspect he is sleepy due to medications. Will discharge pt home and advised caregiver to close observation tonight and if sxs persist to morning return to ED. Diagnosis and Disposition DISCHARGE NOTE: 
1:32 AM 
Natalya Damian  results have been reviewed with him. He has been counseled regarding his diagnosis, treatment, and plan. He verbally conveys understanding and agreement of the signs, symptoms, diagnosis, treatment and prognosis and additionally agrees to follow up as discussed. He also agrees with the care-plan and conveys that all of his questions have been answered. I have also provided discharge instructions for him that include: educational information regarding their diagnosis and treatment, and list of reasons why they would want to return to the ED prior to their follow-up appointment, should his condition change. He has been provided with education for proper emergency department utilization. CLINICAL IMPRESSION: 
 
1. Adverse effect of drug, initial encounter 2. Urinary tract infection with hematuria, site unspecified PLAN: 
1. D/C Home 2. Current Discharge Medication List  
  
 
3. Follow-up Information Follow up With Specialties Details Why Contact Tejinder Montoya, DO Internal Medicine Schedule an appointment as soon as possible for a visit For primary care follow up [address] [place] [address] [address] [address] 
[phone] [facility] EM[facility] Emergency Medicine Go to As needed, if symptoms worsen [address] [facility] [address] 
[phone]  
  
 
_______________________________ Attestations: This note is prepared by [facility]SE ELIAS [facility]AN [facility], acting as Scribe for [employer], MD. [employer], MD:  The scribe's documentation has been prepared under my direction and personally reviewed by me in its entirety. I confirm that the note above accurately reflects all work, treatment, procedures, and medical decision making performed by me. 
_______________________________

## 2019-01-19 NOTE — CALL BACK NOTE
Nurse from [address] [facility] called asking if pt was supposed to receive antibiotic for UTI, per Dr. Jyoti Love pt rec'd rocephin here, pt to f/u with his doctor and will wait for culture results.

## 2019-01-19 NOTE — ED NOTES
Spoke with [employer] [facility]) whom reports that patient received Risperidone 0.5mg and Levetiraceta Sol 2.5ml @ 1945.

## 2019-01-19 NOTE — ED TRIAGE NOTES
Presented to ED via EMS to be evaluated for reported decreased awareness with onset this p.m. According to caretaker, patient is normally active with caregiver and staff. She reports given medications @ 2000 and that's when she noted him to be altered. Patient unable to communicate d/t PMH. Patient appears to be in no acute distress at time of triage. Sepsis Screening completed (  )Patient meets SIRS criteria. ( x )Patient does not meet SIRS criteria. SIRS Criteria is achieved when two or more of the following are present ? Temperature < 96.8°F (36°C) or > 100.9°F (38.3°C) ? Heart Rate > 90 beats per minute ? Respiratory Rate > 20 breaths per minute ? WBC count > 12,000 or <4,000 or > 10% bands

## 2019-01-19 NOTE — DISCHARGE INSTRUCTIONS
Patient Education        Urinary Tract Infections in Men: Care Instructions  Your Care Instructions    A urinary tract infection, or UTI, is a general term for an infection anywhere between the kidneys and the tip of the penis. UTIs can also be a result of a prostate problem. Most cause pain or burning when you urinate. Most UTIs are caused by bacteria and can be cured with antibiotics. It is important to complete your treatment so that the infection does not get worse. Follow-up care is a key part of your treatment and safety. Be sure to make and go to all appointments, and call your doctor if you are having problems. It's also a good idea to know your test results and keep a list of the medicines you take. How can you care for yourself at home? · Take your antibiotics as prescribed. Do not stop taking them just because you feel better. You need to take the full course of antibiotics. · Take your medicines exactly as prescribed. Your doctor may have prescribed a medicine, such as phenazopyridine (Pyridium), to help relieve pain when you urinate. This turns your urine orange. You may stop taking it when your symptoms get better. But be sure to take all of your antibiotics, which treat the infection. · Drink extra water for the next day or two. This will help make the urine less concentrated and help wash out the bacteria causing the infection. (If you have kidney, heart, or liver disease and have to limit your fluids, talk with your doctor before you increase your fluid intake.)  · Avoid drinks that are carbonated or have caffeine. They can irritate the bladder. · Urinate often. Try to empty your bladder each time. · To relieve pain, take a hot bath or lay a heating pad (set on low) over your lower belly or genital area. Never go to sleep with a heating pad in place. To help prevent UTIs  · Drink plenty of fluids, enough so that your urine is light yellow or clear like water.  If you have kidney, heart, or liver disease and have to limit fluids, talk with your doctor before you increase the amount of fluids you drink. · Urinate when you have the urge. Do not hold your urine for a long time. Urinate before you go to sleep. · Keep your penis clean. Catheter care  If you have a drainage tube (catheter) in place, the following steps will help you care for it. · Always wash your hands before and after touching your catheter. · Check the area around the urethra for inflammation or signs of infection. Signs of infection include irritated, swollen, red, or tender skin, or pus around the catheter. · Clean the area around the catheter with soap and water two times a day. Dry with a clean towel afterward. · Do not apply powder or lotion to the skin around the catheter. To empty the urine collection bag  · Wash your hands with soap and water. · Without touching the drain spout, remove the spout from its sleeve at the bottom of the collection bag. Open the valve on the spout. · Let the urine flow out of the bag and into the toilet or a container. Do not let the tubing or drain spout touch anything. · After you empty the bag, clean the end of the drain spout with tissue and water. Close the valve and put the drain spout back into its sleeve at the bottom of the collection bag. · Wash your hands with soap and water. When should you call for help? Call your doctor now or seek immediate medical care if:    · Symptoms such as a fever, chills, nausea, or vomiting get worse or happen for the first time.     · You have new pain in your back just below your rib cage. This is called flank pain.     · There is new blood or pus in your urine.     · You are not able to take or keep down your antibiotics.    Watch closely for changes in your health, and be sure to contact your doctor if:    · You are not getting better after taking an antibiotic for 2 days.     · Your symptoms go away but then come back.    Where can you learn more?  Go to http://lavern-argenis.info/. Enter M418 in the search box to learn more about \"Urinary Tract Infections in Men: Care Instructions. \"  Current as of: March 20, 2018  Content Version: 11.9  © 2086-5691 [employer]. Care instructions adapted under license by [employer] (which disclaims liability or warranty for this information). If you have questions about a medical condition or this instruction, always ask your healthcare professional. [address] any warranty or liability for your use of this information. Side Effects of Medicine: Care Instructions  Your Care Instructions    Medicines are a big part of treatment for many health problems. But all medicines have side effects. Often these are mild problems. They might include a dry mouth or upset stomach. But sometimes medicines can cause dangerous side effects. One example is a bad allergic reaction. The best treatment will depend on what side effects you have. If you have a serious side effect, you may need to stop taking the medicine. You may also need to take another medicine to treat the side effect. If you have a mild side effect, it may go away after you take the medicine for a while. The doctor has checked you carefully, but problems can develop later. If you notice any problems or new symptoms, get medical treatment right away. Follow-up care is a key part of your treatment and safety. Be sure to make and go to all appointments, and call your doctor if you are having problems. It's also a good idea to know your test results and keep a list of the medicines you take. How can you care for yourself at home? · Be safe with medicines. Take your medicines exactly as prescribed. Call your doctor if you think you are having a problem with your medicine. · Call your doctor if side effects bother you and you wonder if you should keep taking a medicine.  Your doctor may be able to lower your dose or change your medicine. Do not suddenly quit taking your medicine unless a doctor tells you to. · Make sure your doctor has a list of all the medicines, vitamins, supplements, and herbal remedies you take. Ask about side effects. When should you call for help? Watch closely for changes in your health, and be sure to contact your doctor if:    · You think you are having a new problem with your medicine.     · You do not get better as expected. Where can you learn more? Go to http://lavern-argenis.info/. Enter D152 in the search box to learn more about \"Side Effects of Medicine: Care Instructions. \"  Current as of: March 28, 2018  Content Version: 11.9  © 3199-1988 [employer]. Care instructions adapted under license by [employer] (which disclaims liability or warranty for this information). If you have questions about a medical condition or this instruction, always ask your healthcare professional. [place] [address] any warranty or liability for your use of this information.

## 2019-01-20 LAB
BACTERIA SPEC CULT: ABNORMAL
SERVICE CMNT-IMP: ABNORMAL

## 2019-01-21 ENCOUNTER — HOSPITAL ENCOUNTER (EMERGENCY)
Age: 64
Discharge: HOME | End: 2019-01-21
Attending: EMERGENCY MEDICINE
Payer: MEDICARE

## 2019-01-21 VITALS
RESPIRATION RATE: 16 BRPM | OXYGEN SATURATION: 100 % | TEMPERATURE: 97.3 F | DIASTOLIC BLOOD PRESSURE: 82 MMHG | SYSTOLIC BLOOD PRESSURE: 128 MMHG | HEART RATE: 56 BPM

## 2019-01-21 DIAGNOSIS — S31.000A SACRAL WOUND, INITIAL ENCOUNTER: Primary | ICD-10-CM

## 2019-01-21 PROCEDURE — 77030037878 HC DRSG MEPILEX >48IN BORD MOLN -B

## 2019-01-21 PROCEDURE — 99283 EMERGENCY DEPT VISIT LOW MDM: CPT

## 2019-01-21 PROCEDURE — 77030020887 HC CRM SKN PROT DIMTH S&N -A

## 2019-01-22 NOTE — ED NOTES
Patient armband removed and shredded I have reviewed discharge instructions with the caregiver. The caregiver verbalized understanding.

## 2019-01-22 NOTE — ED PROVIDER NOTES
EMERGENCY DEPARTMENT HISTORY AND PHYSICAL EXAM 
 
Date: 1/21/2019 Patient Name: Jaclyn Gresham History of Presenting Illness Chief Complaint Patient presents with  Wound Check History Provided By: Caregiver Chief Complaint: bleeding from wound Duration: PTA Timing:  Acute Location: Sacral area Associated Symptoms: denies any other associated signs or symptoms Additional History (Context):  
8:35 PM 
Jaclyn Gresham is a 59 y.o. male with PMHX of Down Syndrome who presents via EMS to the emergency department C/O bleeding from wound, onset just PTA. Pt cannot talk or ambulate at baseline, and regularly uses a wheelchair. Pt was being assisted with a shower at a nursing home when caretaker noted some bleeding from a wound to the patient's sacral area. Physician at facility knows of pts visit to ED. Caregiver denies fevers, vomiting, changes in appetite, blood thinner use, changes in behavior, and any other sxs or complaints. PCP: Cortez Wilson, DO 
 
Current Outpatient Medications Medication Sig Dispense Refill  LORazepam (ATIVAN) 2 mg tablet Take 2 mg by mouth every six (6) hours as needed for Anxiety.  aspirin 81 mg chewable tablet Take 81 mg by mouth daily.  atorvastatin (LIPITOR) 40 mg tablet Take 40 mg by mouth daily.  betamethasone valerate (VALISONE) 0.1 % ointment Apply  to affected area two (2) times a day.  clindamycin (CLINDAGEL) 1 % topical gel Apply  to affected area two (2) times a day. use thin film on affected area  mineral oil-isopropyl myristat (EUCERIN) lotion Apply  to affected area as needed for Dry Skin.  ketoconazole (NIZORAL) 2 % shampoo Apply  to affected area daily as needed for Itching.  clotrimazole (LOTRIMIN) 1 % topical cream Apply  to affected area two (2) times a day.  vitamin a & d (A&D) ointment Apply  to affected area as needed for Skin Irritation.  LORazepam (ATIVAN) 1 mg tablet Take 1 Tab by mouth every four (4) hours as needed for Anxiety. Max Daily Amount: 6 mg. 2 Tab 0  
 divalproex (DEPAKOTE SPRINKLE) 125 mg capsule Take 2 Caps by mouth two (2) times a day. (Patient taking differently: Take 250 mg by mouth daily.) 60 Cap 0  
 pantoprazole (PROTONIX) 40 mg tablet Take 1 Tab by mouth two (2) times a day. 30 Tab 0  
 citalopram (CELEXA) 20 mg tablet Take  by mouth daily.  omega-3 fatty acids-vitamin e (FISH OIL) 1,000 mg cap Take 1 Cap by mouth.  fluticasone (FLONASE) 50 mcg/actuation nasal spray 2 Sprays by Both Nostrils route daily.  nutritional supplements (BROOKS) pack Take  by mouth.  risperiDONE (RISPERDAL) 1 mg tablet Take  by mouth daily.  acetaminophen (TYLENOL) 650 mg CR tablet Take 650 mg by mouth every six (6) hours as needed for Pain.  bisacodyl (BISCOLAX) 10 mg suppository Insert 10 mg into rectum daily.  loperamide (IMMODIUM) 2 mg tablet Take 2 mg by mouth four (4) times daily as needed for Diarrhea.  methocarbamol (ROBAXIN) 500 mg tablet Take 500 mg by mouth four (4) times daily.  MAG HYDROX/AL HYDROX/SIMETH (MINTOX PO) Take 15 mL by mouth daily as needed.  naproxen (NAPROSYN) 500 mg tablet Take 500 mg by mouth two (2) times daily (with meals).  risperiDONE (RISPERDAL) 0.5 mg tablet Take 0.5 mg by mouth as needed.  PSEUDOEPHEDRINE-dextromethorphan-guaiFENesin (ROBAFEN CF) 30- mg/5 mL solution Take 5 mL by mouth every four (4) hours as needed for Cough. Past History Past Medical History: 
Past Medical History:  
Diagnosis Date  Alzheimer's dementia  Down's syndrome  Essential hypertension  Seizure ([facility] [address] Past Surgical History: 
History reviewed. No pertinent surgical history. Family History: 
History reviewed. No pertinent family history. Social History: 
Social History Tobacco Use  Smoking status: Never Smoker  Smokeless tobacco: Never Used Substance Use Topics  Alcohol use: No  
 Drug use: No  
 
 
Allergies: 
No Known Allergies Review of Systems Review of Systems Constitutional: Negative for fever. Gastrointestinal: Negative for vomiting. Skin: Positive for wound (sacral area). Psychiatric/Behavioral: (-) changes in behavior All other systems reviewed and are negative. Physical Exam  
 
Vitals:  
 01/21/19 2040 01/21/19 2045 BP: 116/73 128/82 Pulse: 60 (!) 56 Resp: 16 16 Temp: 97.3 °F (36.3 °C) SpO2: 100% 100% Physical Exam  
Nursing note and vitals reviewed. Constitutional: Elderly  male, no acute distress. Non verbal at baseline. Head: Normocephalic, Atraumatic Eyes: Pupils are equal, round, and reactive to light, EOMI, no scleral icterus, no conjunctival pallor ENT: moist mucous membranes, hearing intact Neck: Supple, non-tender Cardiovascular: Regular rate and rhythm, no murmurs, rubs, or gallops Chest: Normal work of breathing and chest excursion bilaterally Lungs: Clear to ausculation bilaterally, no wheezes, no rhonchi Abdomen: Soft, non tender, non distended, normoactive bowel sounds Back: Superficial linear stage 1/2 wound along his gluteal cleft that is nonbleeding, no exudate or purulent discharge, minimal erythema. Extremities: No evidence of trauma or deformity, no LE edema Skin: Warm and dry, normal cap refill Neuro: Alert and appropriate, CN intact, normal speech, moving all 4 extremities freely and symmetrically Psychiatric: Cooperative, appropriate mood Diagnostic Study Results Labs - No results found for this or any previous visit (from the past 12 hour(s)). Radiologic Studies - No orders to display CT Results  (Last 48 hours) None CXR Results  (Last 48 hours) None Medications given in the ED- Medications - No data to display Medical Decision Making I am the first provider for this patient. I reviewed the vital signs, available nursing notes, past medical history, past surgical history, family history and social history. Vital Signs-Reviewed the patient's vital signs. Pulse Oximetry Analysis - 100% on RA Records Reviewed: Nursing Notes and Old Medical Records Provider Notes (Medical Decision Making): 59 y.o. male with Hx of Down Syndrome, non-mobile at baseline, presenting with sacral wound. On exam he is afebrile with appropriate vital signs, non-toxic in appearance. On rectal exam he has a linear stage 1/2 sacral wound with no exudate or purulent discharge concerning for infection. As the pt presented with no systemic sxs and is afebrile, there is no need for lab work at the time. Wound will be dressed and caregiver educated on how to change dressing for the wound. Procedures: 
Procedures PROCEDURE NOTE - RECTAL EXAM:  
8:40 PM 
Performed by: General Rei DO 
Rectal exam performed. Superficial linear stage 1 wound along his gluteal cleft that is nonbleeding, no exudate or purulent discharge, minimal erythema, The procedure took 1-15 minutes, and pt tolerated well. Written by Bailey Ramos ED Scribe, as dictated by General Rei DO. 
  
ED Course:  
8:35 PM Initial assessment performed. The patients presenting problems have been discussed, and they are in agreement with the care plan formulated and outlined with them. I have encouraged them to ask questions as they arise throughout their visit. Diagnosis and Disposition DISCHARGE NOTE: 
8:59 PM  
Enoch Lacrosse  results have been reviewed with him. He has been counseled regarding his diagnosis, treatment, and plan. He verbally conveys understanding and agreement of the signs, symptoms, diagnosis, treatment and prognosis and additionally agrees to follow up as discussed.   He also agrees with the care-plan and conveys that all of his questions have been answered. I have also provided discharge instructions for him that include: educational information regarding their diagnosis and treatment, and list of reasons why they would want to return to the ED prior to their follow-up appointment, should his condition change. He has been provided with education for proper emergency department utilization. CLINICAL IMPRESSION: 
 
1. Sacral wound, initial encounter PLAN: 
1. D/C Home 2. Discharge Medication List as of 1/21/2019  8:58 PM  
  
 
3. Follow-up Information Follow up With Specialties Details Why Contact Info Gabriela Diallo DO Internal Medicine Schedule an appointment as soon as possible for a visit in 2 days for primary care follow-up [address] [place] [address] 
[phone] [facility] EMERGENCY DEPT Emergency Medicine Go to As needed, if symptoms worsen [address] [address] 
[phone]  
  
 
_______________________________ Attestations: This note is prepared by Froylan Angelucci and Ivette Bravo, acting as Scribe for Bravo Vickers DO. Bravo Vickers DO:  The scribe's documentation has been prepared under my direction and personally reviewed by me in its entirety. I confirm that the note above accurately reflects all work, treatment, procedures, and medical decision making performed by me. 
_______________________________

## 2019-01-22 NOTE — ED TRIAGE NOTES
Pt presents to tx room via EMS w/ c/o a wound to the sacral area. Pt is nonverbal and is escorted by caretaker.

## 2019-01-22 NOTE — DISCHARGE INSTRUCTIONS
Wound Care: After Your Visit  Your Care Instructions  Taking good care of your wound at home will help it heal quickly and reduce your chance of infection. The doctor has checked you carefully, but problems can develop later. If you notice any problems or new symptoms, get medical treatment right away. Follow-up care is a key part of your treatment and safety. Be sure to make and go to all appointments, and call your doctor if you are having problems. It's also a good idea to know your test results and keep a list of the medicines you take. How can you care for yourself at home? · Clean the area with soap and water 2 times a day unless your doctor gives you different instructions. Don't use hydrogen peroxide or alcohol, which can slow healing. ¨ You may cover the wound with a thin layer of antibiotic ointment, such as bacitracin, and a nonstick bandage. ¨ Apply more ointment and replace the bandage as needed. · Take pain medicines exactly as directed. Some pain is normal with a wound, but do not ignore pain that is getting worse instead of better. You could have an infection. ¨ If the doctor gave you a prescription medicine for pain, take it as prescribed. ¨ If you are not taking a prescription pain medicine, ask your doctor if you can take an over-the-counter medicine. · Your doctor may have closed your wound with stitches (sutures), staples, or skin glue. ¨ If you have stitches, your doctor may remove them after several days to 2 weeks. Or you may have stitches that dissolve on their own. ¨ If you have staples, your doctor may remove them after 7 to 10 days. ¨ If your wound was closed with skin glue, the glue will wear off in a few days to 2 weeks. When should you call for help? Call your doctor now or seek immediate medical care if:  · You have signs of infection, such as:  ¨ Increased pain, swelling, warmth, or redness near the wound. ¨ Red streaks leading from the wound.   ¨ Pus draining from the wound.  ¨ A fever. · You bleed so much from your incision that you soak one or more bandages over 2 to 4 hours. Watch closely for changes in your health, and be sure to contact your doctor if:  · The wound is not getting better each day. Where can you learn more? Go to [employer]  Enter M973 in the search box to learn more about \"Wound Care: After Your Visit. \"   © 4900-3233 [employer], Incorporated. Care instructions adapted under license by [facility] (which disclaims liability or warranty for this information). This care instruction is for use with your licensed healthcare professional. If you have questions about a medical condition or this instruction, always ask your healthcare professional. [address] any warranty or liability for your use of this information. Content Version: 62.4.784919;  Last Revised: April 23, 2012

## 2019-03-14 ENCOUNTER — HOSPITAL ENCOUNTER (OUTPATIENT)
Dept: GENERAL RADIOLOGY | Age: 64
Discharge: HOME | End: 2019-03-14
Attending: INTERNAL MEDICINE
Payer: MEDICARE

## 2019-03-14 ENCOUNTER — APPOINTMENT (OUTPATIENT)
Dept: PHYSICAL THERAPY | Age: 64
End: 2019-03-14
Attending: INTERNAL MEDICINE
Payer: MEDICARE

## 2019-03-14 ENCOUNTER — HOSPITAL ENCOUNTER (OUTPATIENT)
Dept: PHYSICAL THERAPY | Age: 64
Discharge: HOME | End: 2019-03-14
Attending: INTERNAL MEDICINE
Payer: MEDICARE

## 2019-03-14 DIAGNOSIS — R13.10 DYSPHAGIA: ICD-10-CM

## 2019-03-14 PROCEDURE — 92611 MOTION FLUOROSCOPY/SWALLOW: CPT

## 2019-03-14 PROCEDURE — 74011000255 HC RX REV CODE- 255: Performed by: INTERNAL MEDICINE

## 2019-03-14 PROCEDURE — 74230 X-RAY XM SWLNG FUNCJ C+: CPT

## 2019-03-14 RX ADMIN — BARIUM SULFATE 50 G: 960 POWDER, FOR SUSPENSION ORAL at 10:54

## 2019-03-14 RX ADMIN — BARIUM SULFATE 700 MG: 700 TABLET ORAL at 10:54

## 2019-03-14 RX ADMIN — BARIUM SULFATE 20 ML: 400 SUSPENSION ORAL at 10:54

## 2019-03-14 NOTE — PROGRESS NOTES
[facility] at [place] [id number] [facility],  Sona Goodson, [address] [facility] [address]  Ph: [phone]    Fax: [phone]    Outpatient Modified Barium Swallow Evaluation    Patient: Rosendo Luo (17 y.o. male)  Date: 3/14/2019  Primary Diagnosis: Dysphagia, unspecified [R13.10]       Precautions: Aspiration       Radiologist: Danny     History: Down's syndrome, dementia, PNA    Videofluoroscopy Results/Recommendations:  Modified Barium Swallow completed with 0 aspiration evident across all study trials, to include: thin liquid +/- straw; nectar-thick liquid; pudding; and 13 mm barium pill whole in puree. Puree diet at baseline. Thin liquid and 13 mm Ba+ tablet with puree; WFL with (+) bolus cohesion, manipulation, and propulsion; (+) swallow reflex; and (+) hyolaryngeal excursion. Mild premature spillage noted with NTL and puree, flash penetration and mild vallecular and pyriform residuals present in puree trials, cleared with second volitional swallow. Pt presents with moderate oral and mild pharyngeal dysphagia, as evidenced above. Of note, patient demo significant strong cough several times throughout the study, however the patients airway remained protected. At this time, safe for puree, thin liquid diet with pills presented whole in puree. SLP utilized video of study for visual feedback, education regarding safe swallowing guidelines, and recommendations for caregiver; verbalized comprehension. Video Flouroscopic Procedures  [x] Lateral View   [] A-P View [] Scanned to level of Sternum    [x] Seated at 90 deg.   [] Other:    Presentation:    [x] Spoon   [x] Cup   [x] Straw   [] Syringe   [x] Consecutive Swallows  [] Other:    Consistencies:   [x] Ba+ liquid   [x] Ba+ liquid (nectar)   [] Ba+ liquid (honey)   [x] Ba+ puree [] Ba+ cookie [x] 13 mm Ba pill with puree    Testing Discontinued:   [] Due to:    Treatment Techniques Attempted  [] Head Turn: [] Right [] Left     [] Head Tilt: [] Right [] Left     [] Chin Down:  [x] Small Sips/bites:  [] Effortful swallow:  [] Double swallow:  [] Other:    Results  Dysphagia Present:     [] Yes  [] No    Ratings of Dysphagia:     [x] Mild  [x] Moderate  [] Severe    Stages of Breakdown:   [x] Oral  [x] Pharyngeal  [] Esophageal    Aspiration:   [] Yes    [x] No  [] At Risk     Cough: [] Yes      [] No     Penetration:   [x] Yes    [] No     Cough: [] Yes      [] No   [x] Flash/trace   [] Mod   [] To Chords          Consistency Aspirated:   Consistency Penetrated:   [] Thin Liquid     [] Thin Liquid  [] Nectar-thick Liquid    [] Nectar-thick Liquid   [] Honey-thick Liquid    [] Honey-thick Liquid   [] Puree     [x] Puree  [] Solid     [] Solid  [] 13 mm Ba pill with puree   [] 13 mm Ba pill with puree     Motility Problems with:  [] Lip Closure:    [x] Mastication:   [] Bolus Formation/control:   [] A-P Transport:  [x] Tongue Base Retraction:  [] Swallow Response (delayed):  [] Velopharyngeal Closure:  [] Pharyngeal Aspirations:  [] Laryngeal Elevation/adduction:  [] Epiglottic Inversion:  [] Pharyngeal motility/sensation:  [] Cricopharyngeal Relaxation:  [] Esophageal Peristalsis:  [] Other:    Timing of Aspiration/Penetration:  [] Before Swallow:  [x] During Swallow: flash penetration  [] After Swallow:    Transit Time Delay:  [] >1 Second  Oral  [] >1 Second Pharyngeal  [] >20 Second Esophageal     Residuals:  [x] Vallecula:    [x] Mild  [] Mod  [] Severe  [x] Pyriform Sinus:   [x] Mild  [] Mod  [] Severe  [] Posterior Pharyngeal Wall:  [] Mild  [] Mod  [] Severe    The severity rating is based on the following outcomes:    National Outcomes Measures (NOMS) - YAMINI Noms Swallow Level 4  8-point Penetration-Aspiration Scale - Score 2  Professional Judgement    Amelia Bradley M.S., CCC-SLP  Speech-Language Pathologist    [facility]

## 2019-03-15 LAB
ATRIAL RATE: 59 BPM
CALCULATED P AXIS, ECG09: 40 DEGREES
CALCULATED R AXIS, ECG10: -2 DEGREES
CALCULATED T AXIS, ECG11: 53 DEGREES
DIAGNOSIS, 93000: NORMAL
P-R INTERVAL, ECG05: 148 MS
Q-T INTERVAL, ECG07: 458 MS
QRS DURATION, ECG06: 84 MS
QTC CALCULATION (BEZET), ECG08: 429 MS
VENTRICULAR RATE, ECG03: 53 BPM

## 2019-03-28 ENCOUNTER — HOSPITAL ENCOUNTER (OUTPATIENT)
Dept: LAB | Age: 64
Discharge: HOME | End: 2019-03-28
Payer: MEDICARE

## 2019-03-28 LAB
ALBUMIN SERPL-MCNC: 3.1 G/DL (ref 3.4–5)
ALBUMIN/GLOB SERPL: 0.8 {RATIO} (ref 0.8–1.7)
ALP SERPL-CCNC: 62 U/L (ref 45–117)
ALT SERPL-CCNC: 26 U/L (ref 16–61)
ANION GAP SERPL CALC-SCNC: 4 MMOL/L (ref 3–18)
AST SERPL-CCNC: 18 U/L (ref 15–37)
BASOPHILS # BLD: 0.1 K/UL (ref 0–0.1)
BASOPHILS NFR BLD: 2 % (ref 0–2)
BILIRUB SERPL-MCNC: 0.4 MG/DL (ref 0.2–1)
BUN SERPL-MCNC: 22 MG/DL (ref 7–18)
BUN/CREAT SERPL: 25 (ref 12–20)
CALCIUM SERPL-MCNC: 8.3 MG/DL (ref 8.5–10.1)
CHLORIDE SERPL-SCNC: 107 MMOL/L (ref 100–108)
CO2 SERPL-SCNC: 29 MMOL/L (ref 21–32)
CREAT SERPL-MCNC: 0.89 MG/DL (ref 0.6–1.3)
DIFFERENTIAL METHOD BLD: ABNORMAL
EOSINOPHIL # BLD: 0.1 K/UL (ref 0–0.4)
EOSINOPHIL NFR BLD: 3 % (ref 0–5)
ERYTHROCYTE [DISTWIDTH] IN BLOOD BY AUTOMATED COUNT: 13.9 % (ref 11.6–14.5)
GLOBULIN SER CALC-MCNC: 3.9 G/DL (ref 2–4)
GLUCOSE SERPL-MCNC: 83 MG/DL (ref 74–99)
HCT VFR BLD AUTO: 43.6 % (ref 36–48)
HGB BLD-MCNC: 14.3 G/DL (ref 13–16)
LYMPHOCYTES # BLD: 2.2 K/UL (ref 0.9–3.6)
LYMPHOCYTES NFR BLD: 51 % (ref 21–52)
MCH RBC QN AUTO: 33 PG (ref 24–34)
MCHC RBC AUTO-ENTMCNC: 32.8 G/DL (ref 31–37)
MCV RBC AUTO: 100.7 FL (ref 74–97)
MONOCYTES # BLD: 0.3 K/UL (ref 0.05–1.2)
MONOCYTES NFR BLD: 8 % (ref 3–10)
NEUTS SEG # BLD: 1.5 K/UL (ref 1.8–8)
NEUTS SEG NFR BLD: 36 % (ref 40–73)
PLATELET # BLD AUTO: 201 K/UL (ref 135–420)
PMV BLD AUTO: 10 FL (ref 9.2–11.8)
POTASSIUM SERPL-SCNC: 4.7 MMOL/L (ref 3.5–5.5)
PREALB SERPL-MCNC: 24.4 MG/DL (ref 20–40)
PROT SERPL-MCNC: 7 G/DL (ref 6.4–8.2)
RBC # BLD AUTO: 4.33 M/UL (ref 4.7–5.5)
SODIUM SERPL-SCNC: 140 MMOL/L (ref 136–145)
WBC # BLD AUTO: 4.2 K/UL (ref 4.6–13.2)

## 2019-03-28 PROCEDURE — 80053 COMPREHEN METABOLIC PANEL: CPT

## 2019-03-28 PROCEDURE — 85025 COMPLETE CBC W/AUTO DIFF WBC: CPT

## 2019-03-28 PROCEDURE — 36415 COLL VENOUS BLD VENIPUNCTURE: CPT

## 2019-03-28 PROCEDURE — 84134 ASSAY OF PREALBUMIN: CPT

## 2019-04-01 LAB
FAX TO INFO,FAXT: NORMAL
FAX TO NUMBER,FAXN: NORMAL

## 2019-05-31 ENCOUNTER — HOSPITAL ENCOUNTER (EMERGENCY)
Age: 64
Discharge: HOME | End: 2019-05-31
Attending: EMERGENCY MEDICINE
Payer: MEDICARE

## 2019-05-31 VITALS
HEART RATE: 51 BPM | TEMPERATURE: 97.4 F | RESPIRATION RATE: 14 BRPM | OXYGEN SATURATION: 99 % | SYSTOLIC BLOOD PRESSURE: 106 MMHG | DIASTOLIC BLOOD PRESSURE: 52 MMHG
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
## 2019-05-31 DIAGNOSIS — Q90.9 HISTORY OF DOWN SYNDROME: ICD-10-CM

## 2019-05-31 PROCEDURE — 99281 EMR DPT VST MAYX REQ PHY/QHP: CPT

## 2019-05-31 PROCEDURE — 99282 EMERGENCY DEPT VISIT SF MDM: CPT

## 2019-06-01 NOTE — ED PROVIDER NOTES
EMERGENCY DEPARTMENT HISTORY AND PHYSICAL EXAM    Date: 2019  Patient Name: Bethanie Flower    History of Presenting Illness     Chief Complaint   Patient presents with    Fall         History Provided By: Caregiver    Additional History (Context):   10:10 PM  Bethanie Flower is a 59 y.o. male with PMHX Alzheimer's, Down syndrome, hypertension, seizure presents to the ED c/o fall that occurred approximately 8:30 PM this evening. Per the patient's caregiver she was attempting to move the patient with a [employer] lift to the wheelchair when she accidentally tipped over the [address] with the patient in it. She reports that the patient did not hit the ground or his head and was still in the left and she was able to get him from the left back into his wheelchair. She reports that he has been acting appropriately since and is moving all extremities without difficulty spontaneously. Pt's caregiver denies LOC, change in mental status, apparent pain when moving extremities, vomiting and any other sxs or complaints. PCP: Ángel Bautista, DO    Current Outpatient Medications   Medication Sig Dispense Refill    LORazepam (ATIVAN) 2 mg tablet Take 2 mg by mouth every six (6) hours as needed for Anxiety.  aspirin 81 mg chewable tablet Take 81 mg by mouth daily.  atorvastatin (LIPITOR) 40 mg tablet Take 40 mg by mouth daily.  betamethasone valerate (VALISONE) 0.1 % ointment Apply  to affected area two (2) times a day.  clindamycin (CLINDAGEL) 1 % topical gel Apply  to affected area two (2) times a day. use thin film on affected area      mineral oil-isopropyl myristat (EUCERIN) lotion Apply  to affected area as needed for Dry Skin.  ketoconazole (NIZORAL) 2 % shampoo Apply  to affected area daily as needed for Itching.  clotrimazole (LOTRIMIN) 1 % topical cream Apply  to affected area two (2) times a day.       vitamin a & d (A&D) ointment Apply  to affected area as needed for Skin Irritation.  LORazepam (ATIVAN) 1 mg tablet Take 1 Tab by mouth every four (4) hours as needed for Anxiety. Max Daily Amount: 6 mg. 2 Tab 0    divalproex (DEPAKOTE SPRINKLE) 125 mg capsule Take 2 Caps by mouth two (2) times a day. (Patient taking differently: Take 250 mg by mouth daily.) 60 Cap 0    pantoprazole (PROTONIX) 40 mg tablet Take 1 Tab by mouth two (2) times a day. 30 Tab 0    citalopram (CELEXA) 20 mg tablet Take  by mouth daily.  omega-3 fatty acids-vitamin e (FISH OIL) 1,000 mg cap Take 1 Cap by mouth.  fluticasone (FLONASE) 50 mcg/actuation nasal spray 2 Sprays by Both Nostrils route daily.  nutritional supplements (BROOKS) pack Take  by mouth.  risperiDONE (RISPERDAL) 1 mg tablet Take  by mouth daily.  acetaminophen (TYLENOL) 650 mg CR tablet Take 650 mg by mouth every six (6) hours as needed for Pain.  bisacodyl (BISCOLAX) 10 mg suppository Insert 10 mg into rectum daily.  loperamide (IMMODIUM) 2 mg tablet Take 2 mg by mouth four (4) times daily as needed for Diarrhea.  methocarbamol (ROBAXIN) 500 mg tablet Take 500 mg by mouth four (4) times daily.  MAG HYDROX/AL HYDROX/SIMETH (MINTOX PO) Take 15 mL by mouth daily as needed.  naproxen (NAPROSYN) 500 mg tablet Take 500 mg by mouth two (2) times daily (with meals).  risperiDONE (RISPERDAL) 0.5 mg tablet Take 0.5 mg by mouth as needed.  PSEUDOEPHEDRINE-dextromethorphan-guaiFENesin (ROBAFEN CF) 30- mg/5 mL solution Take 5 mL by mouth every four (4) hours as needed for Cough. Past History     Past Medical History:  Past Medical History:   Diagnosis Date    Alzheimer's dementia     Down's syndrome     Essential hypertension     [facility]iz[facility])        Past Surgical History:  History reviewed. No pertinent surgical history. Family History:  History reviewed. No pertinent family history.     Social History:  Social History     Tobacco Use    Smoking status: Never Smoker  Smokeless tobacco: Never Used   Substance Use Topics    Alcohol use: No    Drug use: No       Allergies:  No Known Allergies    Review of Systems     Review of Systems   Unable to perform ROS: Patient nonverbal   Constitutional: Negative for activity change. HENT: Negative for facial swelling. Gastrointestinal: Negative for vomiting. Musculoskeletal: Negative for neck stiffness. Skin: Negative for wound. Neurological: Negative for weakness. Hematological: Does not bruise/bleed easily. All other systems reviewed and are negative. Physical Exam     Vitals:    05/31/19 2142   BP: 106/52   Pulse: (!) 51   Resp: 14   Temp: 97.4 °F (36.3 °C)   SpO2: 99%     Physical Exam   Constitutional:   Mentally challenged patient, nonverbal, no acute distress, sitting in wheelchair    HENT:   Head: Normocephalic and atraumatic. Eyes: Pupils are equal, round, and reactive to light. Conjunctivae are normal.   Tracking appropriately   Neck: Normal range of motion. Neck supple. No tenderness palpation or step-off noted   Cardiovascular: Regular rhythm. Bradycardia   Pulmonary/Chest: Effort normal and breath sounds normal. No respiratory distress. He has no wheezes. He has no rales. Abdominal: Soft. Bowel sounds are normal. He exhibits no distension. There is no tenderness. There is no rebound. Musculoskeletal:   Unable to follow commands  Moving all 4 extremities spontaneously  No point tenderness, bruising or swelling noted, no open area   Neurological: He is alert. Skin: Skin is warm and dry. Diagnostic Study Results     Labs:   No results found for this or any previous visit (from the past 12 hour(s)). Radiologic Studies:     10:10 PM  RADIOLOGY FINDINGS    No orders to display     CT Results  (Last 48 hours)    None        CXR Results  (Last 48 hours)    None          Medical Decision Making     I am the first provider for this patient.     I reviewed the vital signs, available nursing notes, past medical history, past surgical history, family history and social history. Vital Signs: Reviewed the patient's vital signs. Pulse Oximetry Analysis: 99% on RA    Records Reviewed: REVIEWED OLD RECORDS AND NURSING NOTES    Procedures:  Procedures    ED Course:  10:10 PM: Initial Contact       Provider Notes (Medical Decision Making): Patient presents with caregiver for almost falling in the [facility] [place], operated by [facility] lift. Per the caregiver the patient did not hit the ground lose his head or have any loss of consciousness. She reports that he has been acting appropriately. He is difficult to examine due to mental status but is moving all 4 extremities without difficulty. No need for further imaging at this time. Will discharge with strict return precautions. Pt's caregiver understands reasons to return and is offering no questions or concerns. Diagnosis and Disposition     Discharge Note:  10:33 PM:  Mita Cruzt  results have been reviewed with him. He has been counseled regarding his diagnosis, treatment, and plan. He verbally conveys understanding and agreement of the signs, symptoms, diagnosis, treatment and prognosis and additionally agrees to follow up as discussed. He also agrees with the care-plan and conveys that all of his questions have been answered. I have also provided discharge instructions for him that include: educational information regarding their diagnosis and treatment, and list of reasons why they would want to return to the ED prior to their follow-up appointment, should his condition change. He has been provided with education for proper emergency department utilization. Clinical Impression:  1. Fall, initial encounter    2. History of Down syndrome        Plan:  1. D/C Home  2. Discharge Medication List as of 2019 10:34 PM        3.    Follow-up Information     Follow up With Specialties Details Why Contact Tejinder Bautista DO Internal Medicine Schedule an appointment as soon as possible for a visit in 3 days  [address]      [facility] EMERGENCY DEPT Emergency Medicine  As needed, If symptoms worsen [address] [facility] [address]  [phone]          Please note that this dictation was completed with Cinemur, the computer voice recognition software. Quite often unanticipated grammatical, syntax, homophones, and other interpretive errors are inadvertently transcribed by the computer software. Please disregard these errors. Please excuse any errors that have escaped final proofreading.     Valente Pederson, [email or web address]

## 2019-06-01 NOTE — ED TRIAGE NOTES
Patient brought in by caretaker for reported fall. Caregiver reports the patient fell during a hower lift from the bed to the wheelchair. Caregiver denies LOC, reports patient did strike his head on the side of a sofa. No open wounds or lacerations from fall.

## 2019-06-01 NOTE — DISCHARGE INSTRUCTIONS
Follow-up as directed  Return to emergency department for loss of consciousness, severe headache, change in mental status, vomiting, inability to move extremities or worsening of symptoms    Patient Education        Preventing Falls: Care Instructions  Your Care Instructions    Getting around your home safely can be a challenge if you have injuries or health problems that make it easy for you to fall. Loose rugs and furniture in walkways are among the dangers for many older people who have problems walking or who have poor eyesight. People who have conditions such as arthritis, osteoporosis, or dementia also have to be careful not to fall. You can make your home safer with a few simple measures. Follow-up care is a key part of your treatment and safety. Be sure to make and go to all appointments, and call your doctor if you are having problems. It's also a good idea to know your test results and keep a list of the medicines you take. How can you care for yourself at home? Taking care of yourself  · You may get dizzy if you do not drink enough water. To prevent dehydration, drink plenty of fluids, enough so that your urine is light yellow or clear like water. Choose water and other caffeine-free clear liquids. If you have kidney, heart, or liver disease and have to limit fluids, talk with your doctor before you increase the amount of fluids you drink. · Exercise regularly to improve your strength, muscle tone, and balance. Walk if you can. Swimming may be a good choice if you cannot walk easily. · Have your vision and hearing checked each year or any time you notice a change. If you have trouble seeing and hearing, you might not be able to avoid objects and could lose your balance. · Know the side effects of the medicines you take. Ask your doctor or pharmacist whether the medicines you take can affect your balance. Sleeping pills or sedatives can affect your balance. · Limit the amount of alcohol you drink. Alcohol can impair your balance and other senses. · Ask your doctor whether calluses or corns on your feet need to be removed. If you wear loose-fitting shoes because of calluses or corns, you can lose your balance and fall. · Talk to your doctor if you have numbness in your feet. Preventing falls at home  · Remove raised doorway thresholds, throw rugs, and clutter. Repair loose carpet or raised areas in the floor. · Move furniture and electrical cords to keep them out of walking paths. · Use nonskid floor wax, and wipe up spills right away, especially on ceramic tile floors. · If you use a walker or cane, put rubber tips on it. If you use crutches, clean the bottoms of them regularly with an abrasive pad, such as steel wool. · Keep your house well lit, especially Mammie Sitter, and outside walkways. Use night-lights in areas such as hallways and bathrooms. Add extra light switches or use remote switches (such as switches that go on or off when you clap your hands) to make it easier to turn lights on if you have to get up during the night. · Install sturdy handrails on stairways. · Move items in your cabinets so that the things you use a lot are on the lower shelves (about waist level). · Keep a cordless phone and a flashlight with new batteries by your bed. If possible, put a phone in each of the main rooms of your house, or carry a cell phone in case you fall and cannot reach a phone. Or, you can wear a device around your neck or wrist. You push a button that sends a signal for help. · Wear low-heeled shoes that fit well and give your feet good support. Use footwear with nonskid soles. Check the heels and soles of your shoes for wear. Repair or replace worn heels or soles. · Do not wear socks without shoes on wood floors. · Walk on the grass when the sidewalks are slippery. If you live in an area that gets snow and ice in the winter, sprinkle salt on slippery steps and sidewalks.   Preventing falls in the bath  · Install grab bars and nonskid mats inside and outside your shower or tub and near the toilet and sinks. · Use shower chairs and bath benches. · Use a hand-held shower head that will allow you to sit while showering. · Get into a tub or shower by putting the weaker leg in first. Get out of a tub or shower with your strong side first.  · Repair loose toilet seats and consider installing a raised toilet seat to make getting on and off the toilet easier. · Keep your bathroom door unlocked while you are in the shower. Where can you learn more? Go to http://lavern-argenis.info/. Enter [phone] [id number] [phone] in the search box to learn more about \"Preventing Falls: Care Instructions. \"  Current as of: March 16, 2018  Content Version: 11.8  © 6004-6405 [employer]. Care instructions adapted under license by [employer] (which disclaims liability or warranty for this information). If you have questions about a medical condition or this instruction, always ask your healthcare professional. [place] [address] any warranty or liability for your use of this information.

## 2019-06-22 ENCOUNTER — HOSPITAL ENCOUNTER (EMERGENCY)
Age: 64
Discharge: HOME | End: 2019-06-22
Attending: EMERGENCY MEDICINE
Payer: MEDICARE

## 2019-06-22 VITALS
WEIGHT: 172.84 LBS | DIASTOLIC BLOOD PRESSURE: 65 MMHG | RESPIRATION RATE: 16 BRPM | OXYGEN SATURATION: 98 % | BODY MASS INDEX: 27.9 KG/M2 | TEMPERATURE: 98 F | HEART RATE: 66 BPM | SYSTOLIC BLOOD PRESSURE: 164 MMHG

## 2019-06-22 DIAGNOSIS — G30.9 ALZHEIMER'S DEMENTIA WITH BEHAVIORAL DISTURBANCE, UNSPECIFIED TIMING OF DEMENTIA ONSET: ICD-10-CM

## 2019-06-22 DIAGNOSIS — G40.909 SEIZURE DISORDER (HCC): Primary | ICD-10-CM

## 2019-06-22 DIAGNOSIS — F02.81 ALZHEIMER'S DEMENTIA WITH BEHAVIORAL DISTURBANCE, UNSPECIFIED TIMING OF DEMENTIA ONSET: ICD-10-CM

## 2019-06-22 LAB
ATRIAL RATE: 70 BPM
CALCULATED P AXIS, ECG09: 67 DEGREES
CALCULATED R AXIS, ECG10: 8 DEGREES
CALCULATED T AXIS, ECG11: 58 DEGREES
DIAGNOSIS, 93000: NORMAL
P-R INTERVAL, ECG05: 150 MS
Q-T INTERVAL, ECG07: 402 MS
QRS DURATION, ECG06: 84 MS
QTC CALCULATION (BEZET), ECG08: 434 MS
VENTRICULAR RATE, ECG03: 70 BPM

## 2019-06-22 PROCEDURE — 99284 EMERGENCY DEPT VISIT MOD MDM: CPT

## 2019-06-22 PROCEDURE — 93005 ELECTROCARDIOGRAM TRACING: CPT

## 2019-06-22 NOTE — ED NOTES
Caleb Hou from [facility] called to notify that patient is a DNR and on hospice care.  Requested to be updated [phone]

## 2019-06-22 NOTE — ED PROVIDER NOTES
EMERGENCY DEPARTMENT HISTORY AND PHYSICAL EXAM    Date: 6/22/2019  Patient Name: Mikel Matute    History of Presenting Illness     Chief Complaint   Patient presents with    Seizure         History Provided By: Caregiver    8:00 AM  Mikel Matute is a 59 y.o. male with PMHX of Alzheimer's, dementia, seizure disorder who presents to the emergency department C/O a seizure this morning. Per caregiver patient had a seizure around 530 this morning and he did bite his tongue. Patient has a history of seizures. Patient has been getting his medications. Patient is a hospice patient. Patient is nonverbal at baseline and cannot provide any history. PCP: Mishel Pratt, DO    Current Outpatient Medications   Medication Sig Dispense Refill    LORazepam (ATIVAN) 2 mg tablet Take 2 mg by mouth every six (6) hours as needed for Anxiety.  aspirin 81 mg chewable tablet Take 81 mg by mouth daily.  atorvastatin (LIPITOR) 40 mg tablet Take 40 mg by mouth daily.  betamethasone valerate (VALISONE) 0.1 % ointment Apply  to affected area two (2) times a day.  clindamycin (CLINDAGEL) 1 % topical gel Apply  to affected area two (2) times a day. use thin film on affected area      mineral oil-isopropyl myristat (EUCERIN) lotion Apply  to affected area as needed for Dry Skin.  ketoconazole (NIZORAL) 2 % shampoo Apply  to affected area daily as needed for Itching.  clotrimazole (LOTRIMIN) 1 % topical cream Apply  to affected area two (2) times a day.  vitamin a & d (A&D) ointment Apply  to affected area as needed for Skin Irritation.  LORazepam (ATIVAN) 1 mg tablet Take 1 Tab by mouth every four (4) hours as needed for Anxiety. Max Daily Amount: 6 mg. 2 Tab 0    divalproex (DEPAKOTE SPRINKLE) 125 mg capsule Take 2 Caps by mouth two (2) times a day.  (Patient taking differently: Take 250 mg by mouth daily.) 60 Cap 0    pantoprazole (PROTONIX) 40 mg tablet Take 1 Tab by mouth two (2) times a day. 30 Tab 0    citalopram (CELEXA) 20 mg tablet Take  by mouth daily.  omega-3 fatty acids-vitamin e (FISH OIL) 1,000 mg cap Take 1 Cap by mouth.  fluticasone (FLONASE) 50 mcg/actuation nasal spray 2 Sprays by Both Nostrils route daily.  nutritional supplements (BROOKS) pack Take  by mouth.  risperiDONE (RISPERDAL) 1 mg tablet Take  by mouth daily.  acetaminophen (TYLENOL) 650 mg CR tablet Take 650 mg by mouth every six (6) hours as needed for Pain.  bisacodyl (BISCOLAX) 10 mg suppository Insert 10 mg into rectum daily.  loperamide (IMMODIUM) 2 mg tablet Take 2 mg by mouth four (4) times daily as needed for Diarrhea.  methocarbamol (ROBAXIN) 500 mg tablet Take 500 mg by mouth four (4) times daily.  MAG HYDROX/AL HYDROX/SIMETH (MINTOX PO) Take 15 mL by mouth daily as needed.  naproxen (NAPROSYN) 500 mg tablet Take 500 mg by mouth two (2) times daily (with meals).  risperiDONE (RISPERDAL) 0.5 mg tablet Take 0.5 mg by mouth as needed.  PSEUDOEPHEDRINE-dextromethorphan-guaiFENesin (ROBAFEN CF) 30- mg/5 mL solution Take 5 mL by mouth every four (4) hours as needed for Cough. Past History     Past Medical History:  Past Medical History:   Diagnosis Date    Alzheimer's dementia     Down's syndrome     Essential hypertension     Seizure [facility])        Past Surgical History:  History reviewed. No pertinent surgical history. Family History:  History reviewed. No pertinent family history.     Social History:  Social History     Tobacco Use    Smoking status: Never Smoker    Smokeless tobacco: Never Used   Substance Use Topics    Alcohol use: No    Drug use: No       Allergies:  No Known Allergies      Review of Systems   Review of Systems   Unable to perform ROS: Dementia       Physical Exam     Vitals:    06/22/19 0650   BP: 141/88   Pulse: 68   Resp: 16   Temp: 98 °F (36.7 °C)   SpO2: 95%   Weight: 78.4 kg (172 lb 13.5 oz) Physical Exam   Constitutional: He appears well-developed. HENT:   Head: Normocephalic and atraumatic. Eyes: Pupils are equal, round, and reactive to light. Neck: Normal range of motion. Cardiovascular: Normal rate and regular rhythm. Pulmonary/Chest: Effort normal and breath sounds normal.   Abdominal: Soft. Musculoskeletal: Normal range of motion. Neurological: He is alert. Psychiatric: He has a normal mood and affect. Nursing note and vitals reviewed. Diagnostic Study Results     Labs -     Recent Results (from the past 12 hour(s))   EKG, 12 LEAD, INITIAL    Collection Time: 06/22/19  6:58 AM   Result Value Ref Range    Ventricular Rate 70 BPM    Atrial Rate 70 BPM    P-R Interval 150 ms    QRS Duration 84 ms    Q-T Interval 402 ms    QTC Calculation (Bezet) 434 ms    Calculated P Axis 67 degrees    Calculated R Axis 8 degrees    Calculated T Axis 58 degrees    Diagnosis       Normal sinus rhythm  Normal ECG  When compared with ECG of 18-JAN-2019 22:57,  No significant change was found         Radiologic Studies -  No orders to display     CT Results  (Last 48 hours)    None        CXR Results  (Last 48 hours)    None          Medications given in the ED-  Medications - No data to display      Medical Decision Making   I am the first provider for this patient. I reviewed the vital signs, available nursing notes, past medical history, past surgical history, family history and social history. Vital Signs-Reviewed the patient's vital signs. Records Reviewed: Old Medical Records    Provider Notes (Medical Decision Making): Minh Ragsdale is a 59 y.o. male with a history of dementia, seizures, Down syndrome who is presenting after a seizure this morning. They were concerned because he had blood in his mouth which appears to be from a laceration on his tongue. There is no need to suture/repair this. Patient is a hospice patient per record review.   He is otherwise stable currently has a unremarkable EKG he has a history of seizures this is not a new one patient will be discharged to return back to his facility to return for worsening symptoms or concerns. They were told to continue his home seizure medications as prescribed. And to follow-up with his PCM in the next 1 to 2 days for further evaluation      Diagnosis and Disposition       DISCHARGE NOTE:    Adore Victoria  results have been reviewed with him. He has been counseled regarding his diagnosis, treatment, and plan. He verbally conveys understanding and agreement of the signs, symptoms, diagnosis, treatment and prognosis and additionally agrees to follow up as discussed. He also agrees with the care-plan and conveys that all of his questions have been answered. I have also provided discharge instructions for him that include: educational information regarding their diagnosis and treatment, and list of reasons why they would want to return to the ED prior to their follow-up appointment, should his condition change. He has been provided with education for proper emergency department utilization. CLINICAL IMPRESSION:    1. Seizure disorder ([address]    2. Alzheimer's dementia with behavioral disturbance, unspecified timing of dementia onset        PLAN:  1. D/C Home  2. Current Discharge Medication List        3. Follow-up Information     Follow up With Specialties Details Why Contact Info    Nova Rodrigez, DO Internal Medicine In 2 days please call asap for follow up with [facility] on monday for reevaluation  [address]      [facility] EMERGENCY DEPT Emergency Medicine  As needed, If symptoms worsen [address] [id number]  [phone]        _______________________________      Please note that this dictation was completed with [employer] the [employer] voice recognition software.   Quite often unanticipated grammatical, syntax, homophones, and other interpretive errors are inadvertently transcribed by the computer software. Please disregard these errors. Please excuse any errors that have escaped final proofreading.

## 2019-06-22 NOTE — ED NOTES
I have reviewed discharge instructions with the caregiver. The caregiver verbalized understanding. Patient armband removed and shredded.

## 2019-06-22 NOTE — ED TRIAGE NOTES
Arrive via ems from a residential care home with caregiver adv that the pt had a witnessed seizure during morning care. The caregiver states the pt appeared to have blood coming from the mouth and vomited.

## 2019-06-22 NOTE — DISCHARGE INSTRUCTIONS
Patient Education        Epilepsy: Care Instructions  Your Care Instructions    Epilepsy is a common condition that causes repeated seizures. The seizures are caused by bursts of electrical activity in the brain that aren't normal. Seizures may cause problems with muscle control, movement, speech, vision, or awareness. They can be scary. Epilepsy affects each person differently. Some people have only a few seizures. Others get them more often. If you know what triggers a seizure, you may be able to avoid having one. You can take medicines to control and reduce seizures. You and your doctor will need to find the right combination, schedule, and dose of medicine. This may take time and careful changes. Seizures may get worse and happen more often over time. Follow-up care is a key part of your treatment and safety. Be sure to make and go to all appointments, and call your doctor if you are having problems. It's also a good idea to know your test results and keep a list of the medicines you take. How can you care for yourself at home? · Be safe with medicines. Take your medicines exactly as prescribed. Call your doctor if you think you are having a problem with your medicine. · Make a treatment plan with your doctor. Be sure to follow your plan. · Try to identify and avoid things that may make you more likely to have a seizure. These may include:  ? Not getting enough sleep. ? Using drugs or alcohol. ? Being emotionally stressed. ? Skipping meals. · Keep a record of any seizures you have. Note the date, time of day, and any details about the seizure that you can remember. Your doctor can use this information to plan or adjust your medicine or other treatment. · Be sure that any doctor treating you for another condition knows that you have epilepsy. Each doctor should know what medicines you are taking, if any. · Wear a medical ID bracelet. You can buy this at most [employer].  If you have a seizure that leaves you unconscious or unable to speak for yourself, this bracelet will let those who are treating you know that you have epilepsy. · Talk to your doctor about whether it is safe for you to do certain activities, such as drive or swim. When should you call for help? Call 911 anytime you think you may need emergency care. For example, call if:    · A seizure does not stop as it normally does.     · You have new symptoms such as:  ? Numbness, tingling, or weakness on one side of your body or face. ? Vision changes. ? Trouble speaking or thinking clearly.    Call your doctor now or seek immediate medical care if:    · You have a fever.     · You have a severe headache.    Watch closely for changes in your health, and be sure to contact your doctor if:    · The normal pattern or features of your seizures change. Where can you learn more? Go to http://lavernLezu365argenis.info/. Marly Donahue in the search box to learn more about \"Epilepsy: Care Instructions. \"  Current as of: Daysi 3, 2018  Content Version: 11.9  © 6868-7612 [employer]. Care instructions adapted under license by [employer] (which disclaims liability or warranty for this information). If you have questions about a medical condition or this instruction, always ask your healthcare professional. [place] [address] any warranty or liability for your use of this information. Patient Education        Learning About the Symptoms of Dementia  What is dementia? We all forget things as we get older. Many older people have a slight loss of memory that does not affect their daily lives. But memory loss that gets worse may mean that you have dementia. Dementia is a loss of mental skills that affects your daily life. It can cause problems with memory, problem-solving, and learning. It also can cause problems with thinking and planning. Dementia usually gets worse over time.  But how quickly it gets worse is different for each person. Some people stay the same for years. Others lose skills quickly. Your chances of having dementia rise as you get older. But this doesn't mean that everyone will get it. What causes dementia? Dementia is caused by damage to or changes in the brain. Alzheimer's disease is the most common kind of dementia. Alzheimer's causes a steady loss of memory and how well you can speak, think, and do your daily activities. The second most common kind of dementia is caused by a series of strokes. It's called vascular dementia. It often gets worse step by step. With each new stroke, more mental skills are lost.  Serious head injuries in the past can sometimes lead to dementia, too. What are the symptoms? Usually the first symptom of dementia is memory loss. Often the person who has the memory problem doesn't notice it, but family and friends do. People who have dementia may have increasing trouble with:  · Recalling recent events. They may forget appointments or lose objects. · Recognizing people and places. · Keeping up with conversations and activity. · Finding their way around familiar places, or driving to and from places they know well. · Keeping up personal care such as grooming or bathing. · Planning and carrying out routine tasks. They may have trouble following a recipe or writing a letter or email. What are some next steps? If you are worried about memory loss or changes in your thinking, see your doctor soon. He or she can do a physical exam and ask questions about recent and past illnesses and life events. Think about bringing someone to your doctor's appointment to take notes for you. Your doctor may suggest a series of tests to measure memory changes over time. These tests give the doctor an overall picture of how well your brain is working. The doctor can use the results to decide the best treatment program and help make your life safer and easier.   It is important to know that memory loss and changes in thinking can be caused by things other than dementia. These things can include health problems such as depression, a low amount of thyroid hormone, and some infections. When those things are treated, your symptoms can get better. Follow-up care is a key part of your treatment and safety. Be sure to make and go to all appointments, and call your doctor if you are having problems. It's also a good idea to know your test results and keep a list of the medicines you take. Where can you learn more? Go to http://lavern-argenis.info/. Enter [id number] in the search box to learn more about \"Learning About the Symptoms of Dementia. \"  Current as of: September 11, 2018  Content Version: 11.9  © 5432-6252 [employer]. Care instructions adapted under license by [employer] (which disclaims liability or warranty for this information). If you have questions about a medical condition or this instruction, always ask your healthcare professional. [place] [address] any warranty or liability for your use of this information. Patient Education        Alzheimer's Disease: Care Instructions  Your Care Instructions    Alzheimer's disease is a type of dementia. It causes memory loss and affects judgment, language, and behavior. You may have trouble making decisions or may get lost in places that you used to know well. Alzheimer's disease is different than mild memory loss that occurs with aging. It is not clear what causes Alzheimer's disease, but it is the most common form of dementia in older adults. Finding out that you have this disease is a shock. You may be afraid and worried about how the condition will change your life. Although there is no cure at this time, medicine in some cases may slow memory loss for a while. Other medicines may be able to help you sleep or cope with depression and behavior changes.   Alzheimer's disease is different for everyone. It may take many years to develop. In some cases, people can function well for a long time. In the early stage of the disease, you can do things at home to make life easier and safer. You also can keep doing your hobbies and other activities. Many people find comfort in planning now for their future needs. Follow-up care is a key part of your treatment and safety. Be sure to make and go to all appointments, and call your doctor if you are having problems. It's also a good idea to know your test results and keep a list of the medicines you take. How can you care for yourself at home? Taking care of yourself  · If your doctor gives you medicines, take them exactly as prescribed. Call your doctor if you think you are having a problem with your medicine. You will get more details on the medicines your doctor prescribes. · Eat a balanced diet. Get plenty of whole grains, fruits, and vegetables every day. If you are not hungry at mealtimes, eat snacks at midmorning and in the afternoon. Try drinks such as Boost, Ensure, or Sustacal if you are having trouble keeping your weight up. · Stay active. Exercise such as walking may slow the decline of your mental abilities. Try to stay active mentally too. Read and work crossword puzzles if you enjoy these activities. · If you have trouble sleeping, do not nap during the day. Get regular exercise (but not within several hours of bedtime). Drink a glass of warm milk or caffeine-free herbal tea before going to bed. · Ask your doctor about support groups and other resources in your area. They can help people who have Alzheimer's disease and their families. · Be patient. You may find that a task takes you longer than it used to. · If you have not already done so, make a list of advance directives. Advance directives are instructions to your doctor and family members about what kind of care you want if you become unable to speak or express yourself. Talk to a  about making a will, if you do not already have one. Keeping schedules  · Develop a routine. You will feel less frustrated or confused if you have a clear, simple plan of what to do every day. ? Make lists of your medicines and when to take them. ? Write down appointments and other tasks in a calendar. ? Put sticky notes around the house to help you remember events and other things you have to do. ? Schedule activities and tasks for times of the day when you are best able to handle them. Staying safe  · Tell someone when you are going out and where you are going. Let the person know when you will be back. Before you go out alone, write down where you are going, how to get there, and how to get back home. Do this even if you have gone there many times before. Take someone along with you when possible. · Make your home safe. Tack down rugs, put no-slip tape in the tub, use handrails, and put safety switches on stoves and appliances. · Have a family member or other caregiver tell you whether you are driving badly. Deciding to stop driving is very hard for many people. Driving helps you feel independent. Your state 's license bureau can do a driving test if there is any question. Plan for other means of getting around when you are no longer able to drive. · Use strong lighting, especially at night. Put night-lights in bedrooms, hallways, and bathrooms. · Lower the hot water temperature setting to 120°F or lower to avoid burns. When should you call for help? Call 911 anytime you think you may need emergency care. For example, call if:    · You are lost and do not know whom to call.     · You are injured and do not know whom to call.    Call your doctor now or seek immediate medical care if:    · Your symptoms suddenly get much worse.    Watch closely for changes in your health, and be sure to contact your doctor if:    · You want more information about how you can take care of yourself. Where can you learn more? Go to http://lavern-argenis.info/. Enter Y179 in the search box to learn more about \"Alzheimer's Disease: Care Instructions. \"  Current as of: September 11, 2018  Content Version: 11.9  © 7562-0733 [employer]. Care instructions adapted under license by [employer] (which disclaims liability or warranty for this information). If you have questions about a medical condition or this instruction, always ask your healthcare professional. [address] any warranty or liability for your use of this information.

## 2019-06-27 ENCOUNTER — HOSPITAL ENCOUNTER (OUTPATIENT)
Dept: PHYSICAL THERAPY | Age: 64
Discharge: HOME | End: 2019-06-27
Payer: MEDICARE

## 2019-06-27 ENCOUNTER — HOSPITAL ENCOUNTER (OUTPATIENT)
Dept: GENERAL RADIOLOGY | Age: 64
Discharge: HOME | End: 2019-06-27
Attending: INTERNAL MEDICINE
Payer: MEDICAID

## 2019-06-27 DIAGNOSIS — R13.10 DIFFICULTY IN SWALLOWING: ICD-10-CM

## 2019-06-27 PROCEDURE — 74011000255 HC RX REV CODE- 255: Performed by: INTERNAL MEDICINE

## 2019-06-27 PROCEDURE — 92611 MOTION FLUOROSCOPY/SWALLOW: CPT

## 2019-06-27 PROCEDURE — 74230 X-RAY XM SWLNG FUNCJ C+: CPT

## 2019-06-27 RX ADMIN — BARIUM SULFATE 700 MG: 700 TABLET ORAL at 10:42

## 2019-06-27 RX ADMIN — BARIUM SULFATE 90 G: 960 POWDER, FOR SUSPENSION ORAL at 10:43

## 2019-06-27 RX ADMIN — BARIUM SULFATE 20 ML: 400 PASTE ORAL at 10:42

## 2019-06-27 NOTE — PROGRESS NOTES
[facility] at [place] [id number] [facility],  Sona Goodson, [address] [facility] [address]  Ph: [phone]    Fax: [phone]    Outpatient Modified Barium Swallow Evaluation    Patient: Elise Martinez (44 y.o. male)  Date: 6/27/2019  Primary Diagnosis: Dysphagia, unspecified [R13.10]       Precautions: Aspiration       Radiologist: Sabi Tang    History: Down's syndrome, dementia    Videofluoroscopy Results/Recommendations:  Modified Barium Swallow completed with 0 aspiration evident across all study trials, to include: thin liquid +/- straw; pudding; and 13 mm barium pill whole in puree. Puree diet at baseline. Thin liquid and 13 mm Ba+ tablet with puree; WFL with (+) bolus cohesion, manipulation, and propulsion; (+) swallow reflex; and (+) hyolaryngeal excursion. Mild vallecular and pyriform residuals present in puree trials, cleared with thin liquid wash. Pt presents with moderate oral and mild pharyngeal dysphagia, as evidenced above. Of note, patient demo significant strong cough several times throughout the study, however the patients airway remained protected. At this time, safe for puree, thin liquid diet with pills presented whole in puree, with liquid wash to clear following bite of puree. SLP utilized video of study for visual feedback, education regarding safe swallowing guidelines, and recommendations for caregiver; verbalized comprehension. Video Flouroscopic Procedures  [x] Lateral View   [] A-P View [] Scanned to level of Sternum    [x] Seated at 90 deg.   [] Other:    Presentation:    [x] Spoon   [x] Cup   [x] Straw   [] Syringe   [x] Consecutive Swallows  [] Other:    Consistencies:   [x] Ba+ liquid   [] Ba+ liquid (nectar)   [] Ba+ liquid (honey)   [x] Ba+ puree [] Ba+ cookie [x] 13 mm Ba pill with puree    Testing Discontinued:   [] Due to:    Treatment Techniques Attempted  [] Head Turn: [] Right [] Left     [] Head Tilt: [] Right [] Left     [] Chin Down:  [x] Small Sips/bites:  [] Effortful swallow:  [] Double swallow:  [] Other:    Results  Dysphagia Present:     [x] Yes  [] No    Ratings of Dysphagia:     [x] Mild  [x] Moderate  [] Severe    Stages of Breakdown:   [x] Oral  [x] Pharyngeal  [] Esophageal    Aspiration:   [] Yes    [x] No  [] At Risk     Cough: [] Yes      [] No     Penetration:   [] Yes    [x] No     Cough: [] Yes      [] No   [] Flash/trace   [] Mod   [] To Chords          Consistency Aspirated:   Consistency Penetrated:   [] Thin Liquid     [] Thin Liquid  [] Nectar-thick Liquid    [] Nectar-thick Liquid   [] Honey-thick Liquid    [] Honey-thick Liquid   [] Puree     [] Puree  [] Solid     [] Solid  [] 13 mm Ba pill with puree   [] 13 mm Ba pill with puree     Motility Problems with:  [] Lip Closure:    [x] Mastication:   [x] Bolus Formation/control:   [x] A-P Transport:  [] Tongue Base Retraction:  [] Swallow Response (delayed):  [] Velopharyngeal Closure:  [] Pharyngeal Aspirations:  [] Laryngeal Elevation/adduction:  [] Epiglottic Inversion:  [] Pharyngeal motility/sensation:  [] Cricopharyngeal Relaxation:  [] Esophageal Peristalsis:  [] Other:    Timing of Aspiration/Penetration:  [] Before Swallow:  [] During Swallow:  [] After Swallow:    Transit Time Delay:  [] >1 Second  Oral  [] >1 Second Pharyngeal  [] >20 Second Esophageal     Residuals:  [x] Vallecula:    [x] Mild  [] Mod  [] Severe  [x] Pyriform Sinus:   [x] Mild  [] Mod  [] Severe  [] Posterior Pharyngeal Wall:  [] Mild  [] Mod  [] Severe    The severity rating is based on the following outcomes:    National Outcomes Measures (NOMS) - YAMINI Noms Swallow Level 4  8-point Penetration-Aspiration Scale - Score 1  Professional Judgement    Ori Escobar M.S., CCC-SLP  Speech-Language Pathologist    [facility]

## 2024-03-04 NOTE — ROUTINE PROCESS
Bedside and Verbal shift change report given to Nate Ackerman RN (oncoming nurse) by Laura Aldridge   (offgoing nurse). Report included the following information SBAR, Intake/Output and MAR. Pt had uneventful shift and tolerated medications.   Patient Vitals for the past 12 hrs:   Temp Pulse Resp BP SpO2   06/30/17 1535 98.2 °F (36.8 °C) 66 18 110/66 98 %   06/30/17 1114 98.2 °F (36.8 °C) 69 18 111/63 96 % Creatine stable for now. BMP reviewed- noted Estimated Creatinine Clearance: 69.4 mL/min (based on SCr of 1.2 mg/dL). according to latest data. Based on current GFR, CKD stage is stage 3 - GFR 30-59.  Monitor UOP and serial BMP and adjust therapy as needed. Renally dose meds. Avoid nephrotoxic medications and procedures.    stable

## 2025-03-09 NOTE — CONSULTS
ALINA [address] Leandro Kumar [address] [facility] [place] [address] [address] [address]  Phone [phone] Fax [phone]  Pulmonary Critical Care & Sleep Medicine       Name: Minh Ragsdale MRN: [id number]   : 1955 Hospital: [facility] [place]   Date: 8/3/2018        Critical Care Initial Patient Consult    Requesting MD:                                                  Reason for CC Consult:    IMPRESSION:   Patient Active Problem List   Diagnosis Code    Mental retardation F79    Down's syndrome Q90.9    Alzheimer's dementia G30.9, F02.80    HTN (hypertension) I10    HLD (hyperlipidemia) E78.5    GERD (gastroesophageal reflux disease) K21.9    Sepsis ([facility] [address] A41.9    Acute UTI N39.0    TRANG (acute kidney injury) ([facility] [address] N17.9 ·   GNR in blood   PLAN:  -- Check labs for today  -- GNR in blood in past ehollie was resistent to levaquin will dc cefepime and levaquin and add meropenam with vanco and adjust per sensitivity  -- IV fluids  -- Wheezing in exam ? Vomiting aspiration will add duoneb  -- Ongoing high fever get ABD ct to look for stone hydro or abscess    CVS:BP borderline if require start levophed, Echo and cardiac enz  RS:Wheezing, Will add duoneb and budesonide and recheck xray in am  ID:Follow culture and adjsut vanco 18, Meropenam 8/3/18, Repeat culture in am, Follow up on Echo for any sign of endocarditis  ENDO:SSI  GI:PPI   RENAL:Monitor electrolytes, Repeat labs from am is pending  CNS:Baseline status , Resume depakote, Zyprexa at night   HEMATOLOGY:no acute issue Monitor PLT with GNR sepsis  MUSCULOSKELETAL:no acute issue  PAIN AND SEDATION:prn meds  · Skin/Wound: no acute issue  · Electrolytes: Replace electrolytes per ICU electrolyte replacement protocol. · IVF: NS at 125/hr  · Nutrition: diet per speech, Barium swallow ok  · Prophylaxis: DVT Prophylaxis with lovenox,. GI Prophylaxis. · Restraints: none  · PT/OT eval and treat.  OOB when appropriate. · Lines/Tubes: none. Fish 8/2/18, Femoral central line placed by ER 8/3/18  ADVANCE DIRECTIVE:full code  FAMILY DISCUSSION:Spoke with caretaker  Quality Care: PPI, DVT prophylaxis, HOB elevated, Infection control all reviewed and addressed. Events and notes from last 24 hours reviewed. Care plan discussed with nursing CC TIME:40 min    · Labs and images personally seen and available reports reviewed. · All current medicines are reviewed and doses and prescription adjusted. Subjective/History: This is a 76-year-old patient who lives at [facility]. He has Down syndrome. He has dementia, history of seizures. He came to the hospital with fever that started last night. He was up to 103 at the assisted living facility, had 2 episodes of vomiting per the caregiver with him. They tried Tylenol via EMS en route to the hospital.  He had been eating, drinking and in his usual state of health up until yesterday. He was found to have evidence for UTI in the emergency room. Fish catheter is in place and I cannot tell if that was placed here or there. We will have to investigate and then ultimately he is going to receive a femoral line for continued fluid resuscitation as his blood pressures have been on the low normal side despite fluid resuscitation in the emergency room. He is going to be admitted to the ICU in the interim for sepsis with early shock. His PCP is Dr. Merna Soni. Patient was brought to ICU BP is around 100 SYS  Right Groin femoral line placed by Er physician  Blood culture positive for GNR  Still spiking fever of 102     Past Medical History:   Diagnosis Date    Alzheimer's dementia     Down's syndrome     Seizure ([facility] [address]       History reviewed. No pertinent surgical history. Prior to Admission medications    Medication Sig Start Date End Date Taking?  Authorizing Provider   betamethasone valerate (VALISONE) 0.1 % ointment Apply  to affected area two (2) times a day.   Yes Shaista Hurley MD   clindamycin (CLINDAGEL) 1 % topical gel Apply  to affected area two (2) times a day. use thin film on affected area   Yes Shaista Hurley MD   mineral oil-isopropyl myristat (EUCERIN) lotion Apply  to affected area as needed for Dry Skin. Yes hSaista Hurley MD   ketoconazole (NIZORAL) 2 % shampoo Apply  to affected area daily as needed for Itching. Yes Shaista Hurley MD   pneumococcal 13 john conj dip (PREVNAR 13, PF,) 0.5 mL syrg injection 0.5 mL by IntraMUSCular route PRIOR TO DISCHARGE. Yes Shaista Hurley MD   clotrimazole (LOTRIMIN) 1 % topical cream Apply  to affected area two (2) times a day. Yes Shaista Hurley MD   vitamin a & d (A&D) ointment Apply  to affected area as needed for Skin Irritation. Yes Shaista Hurley MD   LORazepam (ATIVAN) 1 mg tablet Take 1 Tab by mouth every four (4) hours as needed for Anxiety. Max Daily Amount: 6 mg. 7/3/17   Angel Pat MD   divalproex (DEPAKOTE SPRINKLE) 125 mg capsule Take 2 Caps by mouth two (2) times a day. 8/29/16   Mina Reeves DO   pantoprazole (PROTONIX) 40 mg tablet Take 1 Tab by mouth two (2) times a day. 8/29/16   Mina Reeves DO   citalopram (CELEXA) 20 mg tablet Take  by mouth daily. Shaista Hurley MD   omega-3 fatty acids-vitamin e (FISH OIL) 1,000 mg cap Take 1 Cap by mouth. Shaista Hurley MD   fluticasone (FLONASE) 50 mcg/actuation nasal spray 2 Sprays by Both Nostrils route daily. Shaista Hurley MD   nutritional supplements (BROOKS) pack Take  by mouth. Shaista Hurley MD   risperiDONE (RISPERDAL) 1 mg tablet Take  by mouth daily. Shaista Hurley MD   acetaminophen (TYLENOL) 650 mg CR tablet Take 650 mg by mouth every six (6) hours as needed for Pain. Shaista Hurley MD   bisacodyl (BISCOLAX) 10 mg suppository Insert 10 mg into rectum daily. Shaista Hurley MD   loperamide (IMMODIUM) 2 mg tablet Take 2 mg by mouth four (4) times daily as needed for Diarrhea.     Shaista Hurley MD   methocarbamol (ROBAXIN) 500 mg tablet Take 500 mg by mouth four (4) times daily. Shaista Hurley MD   MAG HYDROX/AL HYDROX/SIMETH (MINTOX PO) Take 15 mL by mouth daily as needed. Shaista Hurley MD   naproxen (NAPROSYN) 500 mg tablet Take 500 mg by mouth two (2) times daily (with meals). Shaista Hurley MD   risperiDONE (RISPERDAL) 0.5 mg tablet Take 0.5 mg by mouth as needed. Shaista Hurley MD   PSEUDOEPHEDRINE-dextromethorphan-guaiFENesin (ROBAFEN CF) 30- mg/5 mL solution Take 5 mL by mouth every four (4) hours as needed for Cough. Shaista Hurley MD     Current Facility-Administered Medications   Medication Dose Route Frequency    Vancomycin- Pharmacy to dose  1 Each Other Rx Dosing/Monitoring    [START ON 2018] vancomycin (VANCOCIN) 1250 mg in  ml infusion  1,250 mg IntraVENous Q24H    0.9% sodium chloride infusion  125 mL/hr IntraVENous CONTINUOUS    enoxaparin (LOVENOX) injection 40 mg  40 mg SubCUTAneous Q24H    NOREPINephrine (LEVOPHED) 8 mg in 5% dextrose 250mL infusion  2-16 mcg/min IntraVENous TITRATE    albuterol-ipratropium (DUO-NEB) 2.5 MG-0.5 MG/3 ML  3 mL Nebulization Q6H RT    meropenem (MERREM) 500 mg in sterile water (preservative free) 10 mL IV syringe  0.5 g IntraVENous Q8H    divalproex DR (DEPAKOTE) tablet 250 mg  250 mg Oral Q12H    OLANZapine (ZyPREXA) tablet 2.5 mg  2.5 mg Oral QPM    insulin lispro (HUMALOG) injection   SubCUTAneous AC&HS     No Known Allergies   Social History   Substance Use Topics    Smoking status: Never Smoker    Smokeless tobacco: Never Used    Alcohol use No      History reviewed. No pertinent family history.      Objective:   Vital Signs:    Visit Vitals    BP (!) 82/51    Pulse 84    Temp (!) 102.7 °F (39.3 °C)    Resp 19    Ht 5' 2\" (1.575 m)    Wt 82.1 kg (181 lb)    SpO2 92%    BMI 33.11 kg/m2       O2 Device: Room air       Temp (24hrs), Av.6 °F (38.7 °C), Min:98.2 °F (36.8 °C), Max:105.3 °F (40.7 °C)       Intake/Output:   Last shift:         Last 3 shifts:    No intake or output data in the 24 hours ending 08/03/18 1427        Review of Systems:  Not able to get due to patient condition     Objective:    General: comfortable, no acute distress  HEENT: pupils reactive, sclera anicteric, EOM intact  Neck: No adenopathy or thyroid swelling, no lymphadenopathy or JVD, supple  CVS: S1S2 no murmurs  RS: Mod AE bilaterally, prolonged expiration with wheezing  Abd: soft, non tender, no hepatosplenomegaly  Neuro: non focal, awake, alert  Extrm: no leg edema, clubbing or cyanosis  Lymph node: No obvious palpable lymph node appreciated. Skin: no rash  CBC w/Diff Recent Labs      08/02/18 2325   WBC  6.0   RBC  3.96*   HGB  13.2   HCT  39.9   PLT  161   GRANS  91*   LYMPH  6*   EOS  0        Chemistry Recent Labs      08/02/18 2325   GLU  119*   NA  139   K  3.7   CL  106   CO2  25   BUN  20*   CREA  1.41*   CA  8.2*   MG  1.7   PHOS  1.3*   AGAP  8   BUCR  14   AP  133*   TP  7.1   ALB  2.5*   GLOB  4.6*   AGRAT  0.5*        Lactic Acid Lactic acid   Date Value Ref Range Status   08/03/2018 1.5 0.4 - 2.0 MMOL/L Final     Recent Labs      08/03/18   0452  08/02/18   2325   LAC  1.5  3.0*        Micro  Recent Labs      08/02/18   2340  08/02/18   2325   CULT  NO GROWTH AFTER 6 HOURS  CULTURE IN PROGRESS,FURTHER UPDATES TO FOLLOW  Sent to [facility] for ID/Susceptibility if indicated     Recent Labs      08/02/18   2340  08/02/18   2325   CULT  NO GROWTH AFTER 6 HOURS  CULTURE IN PROGRESS,FURTHER UPDATES TO FOLLOW  Sent to [facility] for ID/Susceptibility if indicated        ABG No results for input(s): PHI, PHI, POC2, PCO2I, PO2, PO2I, HCO3, HCO3I, FIO2, FIO2I in the last 72 hours.      Liver Enzymes Protein, total   Date Value Ref Range Status   08/02/2018 7.1 6.4 - 8.2 g/dL Final     Albumin   Date Value Ref Range Status   08/02/2018 2.5 (L) 3.4 - 5.0 g/dL Final     Globulin   Date Value Ref Range Status   08/02/2018 4.6 (H) 2.0 - 4.0 g/dL Final     A-G Ratio   Date Value Ref Range Status   08/02/2018 0.5 (L) 0.8 - 1.7   Final     AST (SGOT)   Date Value Ref Range Status   08/02/2018 29 15 - 37 U/L Final     Alk. phosphatase   Date Value Ref Range Status   08/02/2018 133 (H) 45 - 117 U/L Final     Recent Labs      08/02/18   2325   TP  7.1   ALB  2.5*   GLOB  4.6*   AGRAT  0.5*   SGOT  29   AP  133*        Cardiac Enzymes No results found for: CPK, CK, CKMMB, CKMB, RCK3, CKMBT, CKNDX, CKND1, ALDAIR, TROPT, TROIQ, KAREN, TROPT, TNIPOC, BNP, BNPP     BNP No results found for: BNP, BNPP, XBNPT     Coagulation No results for input(s): PTP, INR, APTT in the last 72 hours. No lab exists for component: INREXT      Thyroid  No results found for: T4, T3U, TSH, TSHEXT       Lipid Panel Lab Results   Component Value Date/Time    Cholesterol, total 140 05/12/2016 09:59 AM    HDL Cholesterol 46 05/12/2016 09:59 AM    LDL, calculated 75.8 05/12/2016 09:59 AM    VLDL, calculated 18.2 05/12/2016 09:59 AM    Triglyceride 91 05/12/2016 09:59 AM    CHOL/HDL Ratio 3.0 05/12/2016 09:59 AM          Urinalysis Lab Results   Component Value Date/Time    Color YELLOW 08/03/2018 01:10 AM    Appearance CLOUDY 08/03/2018 01:10 AM    Specific gravity 1.017 08/03/2018 01:10 AM    pH (UA) 6.5 08/03/2018 01:10 AM    Protein 30 (A) 08/03/2018 01:10 AM    Glucose NEGATIVE  08/03/2018 01:10 AM    Ketone NEGATIVE  08/03/2018 01:10 AM    Bilirubin NEGATIVE  08/03/2018 01:10 AM    Urobilinogen 1.0 08/03/2018 01:10 AM    Nitrites POSITIVE (A) 08/03/2018 01:10 AM    Leukocyte Esterase LARGE (A) 08/03/2018 01:10 AM    Epithelial cells FEW 08/03/2018 01:10 AM    Bacteria 4+ (A) 08/03/2018 01:10 AM    WBC TOO NUMEROUS TO COUNT 08/03/2018 01:10 AM    RBC 2 to 5 08/03/2018 01:10 AM        XR (Most Recent).  CXR reviewed by me and compared with previous CXR   Results from Hospital Encounter encounter on 08/02/18   XR SWALLOW FUNC VIDEO   Narrative Modified Barium swallow     History: Dysphasia with cough, feeding difficulties    Technique: The patient orally ingested various barium materials under the  direction of a speech pathologist with direct fluoroscopic observation. Fluoroscopic time: 2.2 minutes  Fluoroscopic dose (reference air kerma): 9.94 mGy    Findings:    The patient ingested thin, nectar, pudding, and solid consistencies. Moderate  laryngeal penetration occurred with thin liquids administered by straw, with  trace laryngeal penetration imaged noted with nectar thickened liquids  administered by straw. No evidence of aspiration. Vallecular residue noted with both solid and pudding consistencies. Impression IMPRESSION:    1. Laryngeal penetration as above, moderate severity with thin liquids  administered by straw. No evidence of aspiration. Please refer to the separate report and recommendations from the speech  pathologist.         Tino Ortez (Most Recent)   Results from [place] encounter on 11/09/17   CT HEAD WO CONT   Narrative EXAM: CT head    INDICATION: New onset seizures. COMPARISON: None. TECHNIQUE: Axial CT imaging of the head was performed without intravenous  contrast. Multiplanar reconstructions were performed. One or more dose  reduction techniques were used on this CT: automated exposure control,  adjustment of the mAs and/or kVp according to patient's size, and iterative  reconstruction techniques. The specific techniques utilized on this CT exam have  been documented in the patient's electronic medical record.    _______________    FINDINGS:    VENTRICLES/EXTRA-AXIAL SPACES: There is slight disproportionate enlargement of  the ventricles and relation to the degree of sulcal prominence which may  represent underlying hydrocephalus or central prominent atrophy. Prominent  retrocerebellar CSF signal may represent arachnoid cyst versus negative cisterna  magna. BRAIN PARENCHYMA: There is no evidence of acute intracranial hemorrhage, mass  effect, midline shift, or herniation.  No definite CT evidence of acute cortical  infarct is seen. The gray-white matter differentiation is within normal limits. Incidental basal ganglia calcification on the left. OSSEOUS STRUCTURES: No fracture is seen. PARANASAL SINUSES/MASTOIDS: Visualized paranasal sinuses and mastoid air cells  are clear. ORBITS: The visualized orbits are unremarkable. OTHER: None.      _______________         Impression IMPRESSION:      1. No evidence of acute intracranial abnormality. 2. Disproportionate widening of the ventricles relative to the degree of sulcal  prominence, likely central prominent atrophy versus normal pressure  hydrocephalus. Consider follow-up nonemergent MRI for further characterization. EKG No results found for this or any previous visit. ECHO No results found for this or any previous visit. PFT No flowsheet data found. Other ASA reactivity:   Pre-albumin:   Ionized Calcium:   NH4:   T3, FT4:  Cortisol:  Urine Osm:  Urine Lytes:   HbA1c:          Imaging:  I have personally reviewed the patients radiographs and have reviewed the reports:     Best practice :  Fluids started at 30 ml/kg with aim to keep CVP 8 or above  Lactic acid ordered- initial and repeat Q6hrs if elevated till normalized. Cultures drawn. Antibiotic administered within 1hr-ICU  And 3hrs ED  Pressors aim MAP >65mmHg  Steriods  Glycemic control  Mec. Ventilated patients- aim to keep peak plateau pressure 51-47QU H2O. Sress ulcer prophylaxis  DVT prophylaxis    Vent bundle, Fish bundle and central line bundle followed. Jesse Dubose M.D.   Pulmonary Critical Care & Sleep Medicine stated